# Patient Record
Sex: FEMALE | Race: WHITE | NOT HISPANIC OR LATINO | Employment: FULL TIME | URBAN - METROPOLITAN AREA
[De-identification: names, ages, dates, MRNs, and addresses within clinical notes are randomized per-mention and may not be internally consistent; named-entity substitution may affect disease eponyms.]

---

## 2018-05-10 ENCOUNTER — OFFICE VISIT (OUTPATIENT)
Dept: OTOLARYNGOLOGY | Facility: CLINIC | Age: 48
End: 2018-05-10
Payer: COMMERCIAL

## 2018-05-10 VITALS
WEIGHT: 160 LBS | HEIGHT: 64 IN | BODY MASS INDEX: 27.31 KG/M2 | RESPIRATION RATE: 16 BRPM | HEART RATE: 76 BPM | DIASTOLIC BLOOD PRESSURE: 70 MMHG | SYSTOLIC BLOOD PRESSURE: 118 MMHG

## 2018-05-10 DIAGNOSIS — R42 DISEQUILIBRIUM: ICD-10-CM

## 2018-05-10 DIAGNOSIS — H81.11 BPPV (BENIGN PAROXYSMAL POSITIONAL VERTIGO), RIGHT: Primary | ICD-10-CM

## 2018-05-10 DIAGNOSIS — J06.9 VIRAL URI: ICD-10-CM

## 2018-05-10 PROCEDURE — 99213 OFFICE O/P EST LOW 20 MIN: CPT | Mod: 25 | Performed by: OTOLARYNGOLOGY

## 2018-05-10 PROCEDURE — 95992 CANALITH REPOSITIONING PROC: CPT | Performed by: OTOLARYNGOLOGY

## 2018-05-10 RX ORDER — ATORVASTATIN CALCIUM 20 MG/1
20 TABLET, FILM COATED ORAL
COMMUNITY
Start: 2017-10-23 | End: 2018-08-09 | Stop reason: SDUPTHER

## 2018-05-10 RX ORDER — ACYCLOVIR 400 MG/1
400 TABLET ORAL 3 TIMES DAILY PRN
COMMUNITY
Start: 2017-12-30 | End: 2019-05-22 | Stop reason: ALTCHOICE

## 2018-05-10 ASSESSMENT — ENCOUNTER SYMPTOMS
BACK PAIN: 0
SINUS PRESSURE: 1
DIZZINESS: 1
SLEEP DISTURBANCE: 0
VOMITING: 0
DIARRHEA: 0
HEADACHES: 1
FATIGUE: 0
CONSTIPATION: 0
RHINORRHEA: 0
DYSPHORIC MOOD: 1
WEAKNESS: 0
POLYPHAGIA: 0
FEVER: 0
PALPITATIONS: 0
VOICE CHANGE: 0
COUGH: 0
ARTHRALGIAS: 0
NERVOUS/ANXIOUS: 0
ADENOPATHY: 0
NAUSEA: 0
WHEEZING: 0
TROUBLE SWALLOWING: 0
MYALGIAS: 0
SHORTNESS OF BREATH: 0
FREQUENCY: 0

## 2018-05-10 NOTE — PROGRESS NOTES
Patient ID: Orly Lara                              : 1970    Visit Date: 5/10/2018  Referring Provider: DEIRDRE Saldaña      Chief Complaint: Vertigo    Orly Lara returned to the [place] office today in regard to her history of benign paroxysmal positional vertigo.    Orly suffered a flare up of BPPV last year in the summer. She was able to clear this through self Epley maneuvers.  She then experienced a more severe recurrence in January which took over two weeks to clear.  At the time she used self Epley maneuvers and tried the sommersault maneuver as well.  Believes that the symptoms just gradually resolved after that event.    Two weeks ago the symptoms recurred.  The vertigo has decreased a bit in recent days, but she still has a sensation of unsteadiness on her feet.  She has tinnitus as well.  These symptoms are currently exacerbated by a viral URI that has been ongoing for over one week.  She has not had hearing loss.    Review of Systems   Constitutional: Negative for fatigue and fever.   HENT: Positive for congestion, postnasal drip, sinus pressure and tinnitus. Negative for hearing loss, nosebleeds, rhinorrhea, trouble swallowing and voice change.    Eyes: Negative for visual disturbance.   Respiratory: Negative for cough, shortness of breath and wheezing.    Cardiovascular: Negative for chest pain and palpitations.   Gastrointestinal: Negative for constipation, diarrhea, nausea and vomiting.   Endocrine: Negative for polyphagia and polyuria.   Genitourinary: Negative for frequency.   Musculoskeletal: Negative for arthralgias, back pain, gait problem and myalgias.   Skin: Negative for rash.   Allergic/Immunologic: Negative for environmental allergies.   Neurological: Positive for dizziness and headaches. Negative for weakness.   Hematological: Negative for adenopathy.   Psychiatric/Behavioral: Positive for dysphoric mood. Negative for sleep disturbance. The patient is not  "nervous/anxious.      Current Medications: Acyclovir and atorvastatin.    Physical Exam:  Vitals: /70   Pulse 76   Resp 16   Ht 1.626 m (5' 4\")   Wt 72.6 kg (160 lb)   BMI 27.46 kg/m²   General:  Well-developed, well-nourished 48 y.o. who is in no acute distress.  Voice: Normal without hoarseness, breathiness or stridor.  Head/face:  No scars or lesions.  No parotid masses. No presinus tenderness. Seventh cranial nerves intact.  Eyes:  Extraocular movements and gaze alignment normal.  Ears: Auricles normal.  External auditory canals free of cerumen.  Tympanic membranes clear, mobile and without retraction or scar.  Nose:  Dorsum straight.  Septum sinusoidal.  Turbinates edematous and inflamed but normal in size and orientation.  No pus, polyps or crusts. Clear mucoid secretions bilaterally.  Oral Cavity/oropharynx: Normal tongue thrust. Normal gag reflex. No masses, leukoplakia, erythroplakia, ulcers or other abnormalities at the tongue, floor of mouth, buccal mucosa or palate. Cobblestoning at the posterior pharyngeal wall.  Larynx/hypopharynx:  Normal supraglottis.  Vocal folds smooth and white.  Arytenoids edematous and mobile.  Interarytenoid and post-glottic mucosa edematous.  No masses at the base of tongue, vallecula or piriform sinuses.  Neck:  No masses, adenopathy, cervical spasm or thyroid enlargement.  Cranial Nerves II through XII: Grossly intact.  Lungs: Clear throughout.  Heart: Regular rate and rhythm.  Mental status: Awake, alert and oriented ×3.  No anxiety or depression.  [place]-Hallpike maneuvers: Delayed rotary nystagmus and vertigo in the right head down position.      Epley maneuver  Date/Time: 5/10/2018 9:57 AM  Performed by: TORRES SCHAEFER  Authorized by: TORRES SCHAEFER   Comments: With the patient's verbal consent, she was placed in a supine position with her head lowered gently over the end of the table into my hands.  Her head was gently lowered to 25° below the " horizontal plane while being rotated 45° right of midline.  This triggered the symptoms as described above.  After those symptoms had cleared, the patient maintained this position for 1 minute.  The patient was then assisted in turning her head to 45° left of midline.  The patient maintained this position for 1 minute.  She was then assisted in turning onto her left side in a left lateral decubitus position with the head rotated over the left shoulder looking directly towards the floor.  She maintained this position for 2 minutes.  The patient was then assisted in sitting upright with her legs dangling over the left side of the examination table. She tolerated the procedure well.    Impression:  1.  Right benign paroxysmal positional vertigo status post Epley maneuver.  2.  Disequilibrium relational to BPPV.  3.  Viral upper respiratory infection with rhinitis and eustachian tube dysfunction causing increased tinnitus.    Recommendations and plan:  1.  The patient will follow post-Epley precautions for 3 days.  She received detailed literature in this regard.  2.  Simply saline nasal mist, 2 squirts to each nostril 3 times daily.  3.  Robitussin-DM, 2 teaspoons twice daily as needed until viral upper respiratory infection resolves.  4.  Follow-up in 10 days.    Melany Baltazar MD

## 2018-05-10 NOTE — PROCEDURES
Epley maneuver  Date/Time: 5/10/2018 9:57 AM  Performed by: TORRES SCHAEFER  Authorized by: TORRES SCHAEFER   Comments: With the patient's verbal consent, she was placed in a supine position with her head lowered gently over the end of the table into my hands.  Her head was gently lowered to 25° below the horizontal plane while being rotated 45° right of midline.  This triggered the symptoms as described above.  After those symptoms had cleared, the patient maintained this position for 1 minute.  The patient was then assisted in turning her head to 45° left of midline.  The patient maintained this position for 1 minute.  She was then assisted in turning onto her left side in a left lateral decubitus position with the head rotated over the left shoulder looking directly towards the floor.  She maintained this position for 2 minutes.  The patient was then assisted in sitting upright with her legs dangling over the left side of the examination table. She tolerated the procedure well.

## 2018-05-10 NOTE — PATIENT INSTRUCTIONS
You have benign paroxysmal positional vertigo. This is the most common type of vertigo.  We have treated it with the Epley maneuver today.  Please limit your activities for the next few days as listed below.    1.  No heavy lifting, bending over, reaching up to high shelves, or rapid turning of the head from side to side for 3 days.  2.  No sports, dancing or gardening for 3 days.  3.  Sleep with head propped up on 2-3 pillows on back for 3 nights.  4.  Get up out of bed slowly. Sit on the edge of the bed for a few moments before standing.    5. Simply Saline nasal mist, 2 squirts to each nostril 3x per day.  6. Robitussin DM, 2 teaspoons twice daily as needed until cold resolves.  7. Follow up in 10 days.

## 2018-05-21 ENCOUNTER — OFFICE VISIT (OUTPATIENT)
Dept: OTOLARYNGOLOGY | Facility: CLINIC | Age: 48
End: 2018-05-21
Payer: COMMERCIAL

## 2018-05-21 VITALS
SYSTOLIC BLOOD PRESSURE: 116 MMHG | HEART RATE: 55 BPM | BODY MASS INDEX: 27.31 KG/M2 | WEIGHT: 160 LBS | RESPIRATION RATE: 16 BRPM | HEIGHT: 64 IN | DIASTOLIC BLOOD PRESSURE: 80 MMHG

## 2018-05-21 DIAGNOSIS — H81.12 BENIGN PAROXYSMAL POSITIONAL VERTIGO OF LEFT EAR: Primary | ICD-10-CM

## 2018-05-21 DIAGNOSIS — H83.2X2 VESTIBULAR DISEQUILIBRIUM, LEFT: ICD-10-CM

## 2018-05-21 PROCEDURE — 99213 OFFICE O/P EST LOW 20 MIN: CPT | Performed by: OTOLARYNGOLOGY

## 2018-05-21 ASSESSMENT — ENCOUNTER SYMPTOMS
SINUS PRESSURE: 0
NAUSEA: 0
FATIGUE: 0
VOICE CHANGE: 0
SHORTNESS OF BREATH: 0
BACK PAIN: 0
TROUBLE SWALLOWING: 0
NERVOUS/ANXIOUS: 0
PALPITATIONS: 0
DYSPHORIC MOOD: 1
DIARRHEA: 0
ADENOPATHY: 0
FEVER: 0
RHINORRHEA: 0
DIZZINESS: 1
WEAKNESS: 0
VOMITING: 0
WHEEZING: 0
CONSTIPATION: 0
POLYPHAGIA: 0
ARTHRALGIAS: 0
SLEEP DISTURBANCE: 0
MYALGIAS: 0
COUGH: 0
HEADACHES: 0
FREQUENCY: 0

## 2018-05-21 NOTE — PROGRESS NOTES
"Patient ID: Orly Lara                              : 1970    Visit Date: 2018  Referring Provider: DEIRDRE Ludwig    Chief Complaint: Vertigo    I had the pleasure of seeing Orly Lara when she returned to the [place] office today in regard to recent history of benign paroxysmal positional vertigo.    I am pleased to report that Alvarez is doing very well.  The Epley maneuver alleviated her symptoms.  She has experienced a few brief episodes of wooziness when bending over rapidly but these symptoms have been fleeting.  She continues with non-bothersome bilateral tinnitus.  Her hearing has not changed.    Review of Systems   Constitutional: Negative for fatigue and fever.   HENT: Positive for tinnitus. Negative for congestion, hearing loss, nosebleeds, postnasal drip, rhinorrhea, sinus pressure, trouble swallowing and voice change.    Eyes: Negative for visual disturbance.   Respiratory: Negative for cough, shortness of breath and wheezing.    Cardiovascular: Negative for chest pain and palpitations.   Gastrointestinal: Negative for constipation, diarrhea, nausea and vomiting.   Endocrine: Negative for polyphagia and polyuria.   Genitourinary: Negative for frequency.   Musculoskeletal: Negative for arthralgias, back pain, gait problem and myalgias.   Skin: Negative for rash.   Allergic/Immunologic: Negative for environmental allergies.   Neurological: Positive for dizziness. Negative for weakness and headaches.   Hematological: Negative for adenopathy.   Psychiatric/Behavioral: Positive for dysphoric mood. Negative for sleep disturbance. The patient is not nervous/anxious.      Current Medications: Acyclovir and atorvastatin.    Physical Exam:  Vitals: /80   Pulse (!) 55   Resp 16   Ht 1.626 m (5' 4\")   Wt 72.6 kg (160 lb)   BMI 27.46 kg/m²   General:  Well-developed, well-nourished 48 y.o. who is in no acute distress.  Voice: Normal without hoarseness, breathiness or " stridor.  Head/face:  No scars or lesions.  No parotid masses. No presinus tenderness. Seventh cranial nerves intact.  Eyes:  Extraocular movements and gaze alignment normal.  Ears: Auricles normal.  External auditory canals free of cerumen.  Tympanic membranes clear, mobile and without retraction or scar.  Nose:  Dorsum straight.  Septum sinusoidal.  Turbinates edematous but normal in size and orientation.  No pus, polyps or crusts.  Oral Cavity/oropharynx: Normal tongue thrust. Normal gag reflex. No masses, leukoplakia, erythroplakia, ulcers or other abnormalities at the tongue, floor of mouth, buccal mucosa, palate or posterior pharyngeal wall. Clear post nasal drip. Tonsils - 1 +.  Larynx/hypopharynx:  Normal supraglottis.  Vocal folds smooth and white.  Arytenoids sharp and mobile.  Interarytenoid and post-glottic mucosa normal.  No masses at the base of tongue, vallecula or piriform sinuses.  Neck:  No masses, adenopathy, cervical spasm or thyroid enlargement.  Cranial Nerves II through XII: Grossly intact.  Mental status: Awake, alert and oriented ×3.  No anxiety or depression.  [place]- Hallpike maneuvers: No nystagmus or vertigo in the cardinal positions.  For this reason an Epley maneuver was not performed today.    Impression:  1.  Recent benign paroxysmal positional vertigo, now minimally symptomatic.  2.  Benign tinnitus.    Recommendations and plan:  1.  Habituation exercises twice daily as needed.  2.  Follow-up for recurrent vertigo.  The patient was also instructed in self Epley maneuvers and a half somersault maneuver.    Melany Baltazar MD

## 2018-05-21 NOTE — PATIENT INSTRUCTIONS
1. Try the habituation exercises today.  Do the exercises which cause symptoms twice daily until symptoms are relieved.  2. Come in for recurrent vertigo.  You may also try the self-Epley maneuver or the half somersault.

## 2018-06-13 ENCOUNTER — OFFICE VISIT (OUTPATIENT)
Dept: FAMILY MEDICINE | Facility: CLINIC | Age: 48
End: 2018-06-13
Payer: COMMERCIAL

## 2018-06-13 VITALS
TEMPERATURE: 98.4 F | HEIGHT: 64 IN | WEIGHT: 164.2 LBS | OXYGEN SATURATION: 98 % | HEART RATE: 84 BPM | SYSTOLIC BLOOD PRESSURE: 154 MMHG | DIASTOLIC BLOOD PRESSURE: 104 MMHG | BODY MASS INDEX: 28.03 KG/M2

## 2018-06-13 DIAGNOSIS — J06.9 ACUTE URI: Primary | ICD-10-CM

## 2018-06-13 PROCEDURE — 99213 OFFICE O/P EST LOW 20 MIN: CPT | Performed by: NURSE PRACTITIONER

## 2018-06-13 RX ORDER — CODEINE PHOSPHATE AND GUAIFENESIN 10; 100 MG/5ML; MG/5ML
5 SOLUTION ORAL EVERY 6 HOURS PRN
Qty: 180 ML | Refills: 0 | Status: SHIPPED | OUTPATIENT
Start: 2018-06-13 | End: 2019-07-29

## 2018-06-13 RX ORDER — BENZONATATE 100 MG/1
100 CAPSULE ORAL 3 TIMES DAILY PRN
Qty: 30 CAPSULE | Refills: 0 | Status: SHIPPED | OUTPATIENT
Start: 2018-06-13 | End: 2019-07-29

## 2018-06-13 RX ORDER — AZITHROMYCIN 250 MG/1
TABLET, FILM COATED ORAL
Qty: 6 TABLET | Refills: 0 | Status: SHIPPED | OUTPATIENT
Start: 2018-06-13 | End: 2019-07-29

## 2018-06-13 ASSESSMENT — ENCOUNTER SYMPTOMS
WHEEZING: 0
APPETITE CHANGE: 0
SINUS PRESSURE: 1
COUGH: 1
FACIAL SWELLING: 0
ACTIVITY CHANGE: 0
PALPITATIONS: 0
SHORTNESS OF BREATH: 1
FATIGUE: 1
TROUBLE SWALLOWING: 0
RHINORRHEA: 0
CHEST TIGHTNESS: 0
FEVER: 0
SORE THROAT: 0
CHILLS: 0

## 2018-06-13 NOTE — PROGRESS NOTES
Subjective      Patient ID: Orly Lara is a 48 y.o. female.    Pt presents with x10 days of URI symptoms. Started with 102 fever, head and chest congestion with cough. +fatigue and malaise. Though she was getting better but in the last 2 days, started feeling worse again. No longer having fevers, just dry cough that is very bothersome. Cough is dry, she does admit +CANCINO and chest congestion. She has been coughing so forceful, feels like she pulled a muscle in her back, left side. Sometimes when turning or taking a deep breath, sharp pain. Any coughing causes discomfort in her back. No wheezing. Taking OTC cough medications: Mucinex or Tylenol Cold and Flu. She is sleeping a lot.      Past Medical History:   Diagnosis Date   • Hypertension    • Positional vertigo    • Stroke (CMS/HCC) (HCC)      History reviewed. No pertinent surgical history.  History reviewed. No pertinent family history.  Social History     Social History   • Marital status:      Spouse name: N/A   • Number of children: N/A   • Years of education: N/A     Occupational History   • Not on file.     Social History Main Topics   • Smoking status: Never Smoker   • Smokeless tobacco: Never Used   • Alcohol use Yes   • Drug use: Unknown   • Sexual activity: Not on file     Other Topics Concern   • Not on file     Social History Narrative    Do you wear your seatbelt? Yes    Do you have smoke detector in your home? Yes    Do you have a carbon monoxide detector in your home? 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    Current Marital Status?          Allergies   Allergen Reactions   • No Known Allergies      Current Outpatient Prescriptions on File Prior to Visit   Medication Sig Dispense Refill   • acyclovir (ZOVIRAX) 400 mg tablet 400 mg.     • atorvastatin (LIPITOR) 20 mg tablet 20 mg.       No current facility-administered medications on file prior to visit.        The following have been reviewed and updated as appropriate  "in this visit:  Problems       Review of Systems   Constitutional: Positive for fatigue. Negative for activity change, appetite change, chills and fever.   HENT: Positive for congestion and sinus pressure. Negative for ear pain, facial swelling, postnasal drip, rhinorrhea, sore throat and trouble swallowing.    Eyes: Negative for visual disturbance.   Respiratory: Positive for cough and shortness of breath. Negative for chest tightness and wheezing.    Cardiovascular: Negative for chest pain and palpitations.       Objective     Vitals:    06/13/18 1452   BP: (!) 154/104   BP Location: Right upper arm   Patient Position: Sitting   Pulse: 84   Temp: 36.9 °C (98.4 °F)   TempSrc: Oral   SpO2: 98%   Weight: 74.5 kg (164 lb 3.2 oz)   Height: 1.626 m (5' 4\")     Body mass index is 28.18 kg/m².    Physical Exam   Constitutional: She is oriented to person, place, and time. She appears well-developed and well-nourished. No distress.   HENT:   Head: Normocephalic and atraumatic.   Right Ear: External ear normal.   Left Ear: External ear normal.   Nose: Nose normal.   Mouth/Throat: Oropharynx is clear and moist.   Eyes: Conjunctivae are normal. Pupils are equal, round, and reactive to light.   Neck: Normal range of motion. Neck supple.   Cardiovascular: Normal rate, regular rhythm and normal heart sounds.    Pulmonary/Chest: Effort normal and breath sounds normal. No respiratory distress.   Lymphadenopathy:     She has no cervical adenopathy.   Neurological: She is alert and oriented to person, place, and time.   Skin: Skin is warm and dry.   Psychiatric: She has a normal mood and affect. Her behavior is normal. Judgment and thought content normal.       Assessment/Plan   Problem List Items Addressed This Visit     None      Visit Diagnoses     Acute URI    -  Primary    - Likely post-viral cough  - Rx Tessalon as needed TID, Rx Guaitussin AC PRN  - Fluids and rest. Rx Z-pack only if symptoms fail to improve or worsen    " Relevant Medications    azithromycin (ZITHROMAX) 250 mg tablet      - Consider CXR if rib/posterior thoracic pain persists, pain is likely musculoskeletal strain due to forceful coughing

## 2018-07-09 ENCOUNTER — OFFICE VISIT (OUTPATIENT)
Dept: OTOLARYNGOLOGY | Facility: CLINIC | Age: 48
End: 2018-07-09
Payer: COMMERCIAL

## 2018-07-09 VITALS
BODY MASS INDEX: 27.31 KG/M2 | OXYGEN SATURATION: 98 % | HEART RATE: 68 BPM | WEIGHT: 160 LBS | SYSTOLIC BLOOD PRESSURE: 122 MMHG | DIASTOLIC BLOOD PRESSURE: 86 MMHG | HEIGHT: 64 IN

## 2018-07-09 DIAGNOSIS — H81.12 BENIGN PAROXYSMAL POSITIONAL VERTIGO OF LEFT EAR: Primary | ICD-10-CM

## 2018-07-09 DIAGNOSIS — H83.2X2 VESTIBULAR DISEQUILIBRIUM, LEFT: ICD-10-CM

## 2018-07-09 PROCEDURE — 99213 OFFICE O/P EST LOW 20 MIN: CPT | Performed by: OTOLARYNGOLOGY

## 2018-07-09 ASSESSMENT — ENCOUNTER SYMPTOMS
TROUBLE SWALLOWING: 0
HEADACHES: 0
VOMITING: 0
SHORTNESS OF BREATH: 0
CONSTIPATION: 0
DIZZINESS: 1
FATIGUE: 0
WHEEZING: 0
FREQUENCY: 0
FEVER: 0
VOICE CHANGE: 0
BACK PAIN: 0
DYSPHORIC MOOD: 0
SINUS PRESSURE: 0
ARTHRALGIAS: 0
COUGH: 0
POLYPHAGIA: 0
PALPITATIONS: 0
WEAKNESS: 0
RHINORRHEA: 0
NERVOUS/ANXIOUS: 0
DIARRHEA: 0
MYALGIAS: 0
ADENOPATHY: 0
SLEEP DISTURBANCE: 0
NAUSEA: 0

## 2018-07-09 NOTE — PROGRESS NOTES
"Patient ID: Orly Lara                              : 1970    Visit Date: 2018  Referring Provider: Bethany Celestin CRNP    Chief Complaint: Vertigo    Orly Lara returned to the [place] office today in regard to her history of benign paroxysmal positional vertigo.    Orly is once again vertiginous.  The symptoms started yesterday with out any antecedent event.  Orly has not had allergy symptoms nor a viral illness.  She has not been on an airplane flight recently nor has she suffered a head trauma.    She performed a self Epley maneuver followed by a half somersault maneuver and has minimal symptoms today.  However, she still feels \"not quite right\" and has mild nausea and disequilibrium.  Her hearing has not changed.  She denies tinnitus.    Review of Systems   Constitutional: Negative for fatigue and fever.   HENT: Negative for congestion, hearing loss, nosebleeds, postnasal drip, rhinorrhea, sinus pressure, tinnitus, trouble swallowing and voice change.         Vertigo.   Eyes: Negative for visual disturbance.   Respiratory: Negative for cough, shortness of breath and wheezing.    Cardiovascular: Negative for chest pain and palpitations.   Gastrointestinal: Negative for constipation, diarrhea, nausea and vomiting.   Endocrine: Negative for polyphagia and polyuria.   Genitourinary: Negative for frequency.   Musculoskeletal: Negative for arthralgias, back pain, gait problem and myalgias.   Skin: Negative for rash.   Allergic/Immunologic: Negative for environmental allergies.   Neurological: Positive for dizziness. Negative for weakness and headaches.   Hematological: Negative for adenopathy.   Psychiatric/Behavioral: Negative for dysphoric mood and sleep disturbance. The patient is not nervous/anxious.      Current Medications: Acyclovir and atorvastatin.    Physical Exam:  Vitals: /86   Pulse 68   Ht 1.626 m (5' 4\")   Wt 72.6 kg (160 lb)   SpO2 98%   BMI 27.46 kg/m² "   General:  Well-developed, well-nourished 48 y.o. who is in no acute distress.  Voice: Normal without hoarseness, breathiness or stridor.  Head/face:  No scars or lesions.  No parotid masses. No presinus tenderness. Seventh cranial nerves intact.  Eyes:  Extraocular movements and gaze alignment normal.  No spontaneous or inducible nystagmus.  Ears: Auricles normal.  External auditory canals free of cerumen.  Tympanic membranes clear, mobile and without retraction or scar.  Nose:  Dorsum straight.  Septum deflected to right. Right inferior turbinate edematous with contact points to septum.  Left inferior turbinate normal in size and orientation. No pus, polyps or crusts.  Oral Cavity/oropharynx: Normal tongue thrust. Normal gag reflex. No masses, leukoplakia, erythroplakia, ulcers or other abnormalities at the tongue, floor of mouth, buccal mucosa, palate or posterior pharyngeal wall.  Larynx/hypopharynx:  Normal supraglottis.  Vocal folds smooth and white.  Arytenoids sharp and mobile.  Interarytenoid and post-glottic mucosa normal.  No masses at the base of tongue, vallecula or piriform sinuses.  Neck:  No masses, adenopathy or thyroid enlargement. Mild cervical spasm.  Cranial Nerves II through XII: Grossly intact.  Mental status: Awake, alert and oriented ×3.  No anxiety or depression.  [place] Hallpike Maneuvers: No rotary nystagmus or symptoms in any of the cardinal positions.      Impression:  1.  Left benign paroxysmal positional vertigo.  The patient cleared this with maneuvers on her own.    Recommendations and plan:  1.  Follow post-Epley precautions for the next 2-3 days.  2.  Begin habituation exercises twice daily tomorrow if need be.  3.  Follow-up as needed.        Melany Baltazar MD

## 2018-07-09 NOTE — PATIENT INSTRUCTIONS
1. Sleep with head propped up on pillows for 3 nights. No heavy lifting or bending over.  2. Begin habituation exercises morning and night until symptoms clear.  3. Follow up as needed.

## 2018-08-09 RX ORDER — ATORVASTATIN CALCIUM 20 MG/1
20 TABLET, FILM COATED ORAL DAILY
Qty: 90 TABLET | Refills: 0 | Status: ON HOLD | OUTPATIENT
Start: 2018-08-09 | End: 2018-10-31

## 2018-10-24 ENCOUNTER — APPOINTMENT (RX ONLY)
Dept: URBAN - METROPOLITAN AREA CLINIC 26 | Facility: CLINIC | Age: 48
Setting detail: DERMATOLOGY
End: 2018-10-24

## 2018-10-24 DIAGNOSIS — L57.0 ACTINIC KERATOSIS: ICD-10-CM

## 2018-10-24 DIAGNOSIS — L82.1 OTHER SEBORRHEIC KERATOSIS: ICD-10-CM

## 2018-10-24 PROBLEM — I63.50 CEREBRAL INFARCTION DUE TO UNSPECIFIED OCCLUSION OR STENOSIS OF UNSPECIFIED CEREBRAL ARTERY: Status: ACTIVE | Noted: 2018-10-24

## 2018-10-24 PROBLEM — E78.5 HYPERLIPIDEMIA, UNSPECIFIED: Status: ACTIVE | Noted: 2018-10-24

## 2018-10-24 PROCEDURE — ? LIQUID NITROGEN

## 2018-10-24 PROCEDURE — 17000 DESTRUCT PREMALG LESION: CPT

## 2018-10-24 PROCEDURE — ? COUNSELING

## 2018-10-24 PROCEDURE — 99213 OFFICE O/P EST LOW 20 MIN: CPT | Mod: 25

## 2018-10-24 ASSESSMENT — LOCATION ZONE DERM
LOCATION ZONE: TRUNK
LOCATION ZONE: FACE

## 2018-10-24 ASSESSMENT — LOCATION DETAILED DESCRIPTION DERM
LOCATION DETAILED: RIGHT SUPERIOR LATERAL BUCCAL CHEEK
LOCATION DETAILED: RIGHT MEDIAL SUPERIOR CHEST

## 2018-10-24 ASSESSMENT — LOCATION SIMPLE DESCRIPTION DERM
LOCATION SIMPLE: CHEST
LOCATION SIMPLE: RIGHT CHEEK

## 2018-10-24 NOTE — PROCEDURE: LIQUID NITROGEN
Detail Level: Simple
Render Post-Care Instructions In Note?: yes
Duration Of Freeze Thaw-Cycle (Seconds): 0
Post-Care Instructions: I reviewed with the patient in detail post-care instructions. Patient is to wear sunprotection, and avoid picking at any of the treated lesions. Patient may apply Vaseline to crusted or scabbing areas. Patient understands to RTO 1 month if still present or recurs.
Consent: The patient's consent was obtained including but not limited to risks of crusting, scabbing, blistering, scarring, darker or lighter pigmentary change, recurrence, incomplete removal and infection.

## 2018-10-30 ENCOUNTER — APPOINTMENT (EMERGENCY)
Dept: RADIOLOGY | Facility: HOSPITAL | Age: 48
DRG: 149 | End: 2018-10-30
Attending: EMERGENCY MEDICINE
Payer: COMMERCIAL

## 2018-10-30 ENCOUNTER — HOSPITAL ENCOUNTER (OUTPATIENT)
Facility: HOSPITAL | Age: 48
Setting detail: OBSERVATION
LOS: 1 days | Discharge: HOME | DRG: 149 | End: 2018-10-31
Attending: EMERGENCY MEDICINE | Admitting: HOSPITALIST
Payer: COMMERCIAL

## 2018-10-30 ENCOUNTER — APPOINTMENT (INPATIENT)
Dept: RADIOLOGY | Facility: HOSPITAL | Age: 48
DRG: 149 | End: 2018-10-30
Attending: HOSPITALIST
Payer: COMMERCIAL

## 2018-10-30 DIAGNOSIS — R42 VERTIGO: Primary | ICD-10-CM

## 2018-10-30 LAB
ANION GAP SERPL CALC-SCNC: 5 MEQ/L (ref 3–15)
APTT PPP: 25 SEC (ref 23–35)
BASOPHILS # BLD: 0.03 K/UL (ref 0.01–0.1)
BASOPHILS NFR BLD: 0.5 %
BILIRUB UR QL STRIP.AUTO: NEGATIVE MG/DL
BUN SERPL-MCNC: 10 MG/DL (ref 8–20)
CALCIUM SERPL-MCNC: 9.5 MG/DL (ref 8.9–10.3)
CHLORIDE SERPL-SCNC: 107 MEQ/L (ref 98–109)
CLARITY UR REFRACT.AUTO: CLEAR
CO2 SERPL-SCNC: 25 MEQ/L (ref 22–32)
COLOR UR AUTO: YELLOW
CREAT SERPL-MCNC: 0.7 MG/DL (ref 0.6–1.1)
DIFFERENTIAL METHOD BLD: NORMAL
EOSINOPHIL # BLD: 0.11 K/UL (ref 0.04–0.36)
EOSINOPHIL NFR BLD: 1.9 %
ERYTHROCYTE [DISTWIDTH] IN BLOOD BY AUTOMATED COUNT: 12.1 % (ref 11.7–14.4)
GFR SERPL CREATININE-BSD FRML MDRD: >60 ML/MIN/1.73M*2
GLUCOSE BLD-MCNC: 106 MG/DL (ref 70–99)
GLUCOSE SERPL-MCNC: 93 MG/DL (ref 70–99)
GLUCOSE UR STRIP.AUTO-MCNC: NEGATIVE MG/DL
HCT VFR BLDCO AUTO: 39.9 % (ref 35–45)
HGB BLD-MCNC: 13 G/DL (ref 11.8–15.7)
HGB UR QL STRIP.AUTO: NEGATIVE
IMM GRANULOCYTES # BLD AUTO: 0.01 K/UL (ref 0–0.08)
IMM GRANULOCYTES NFR BLD AUTO: 0.2 %
INR PPP: 0.9 INR
KETONES UR STRIP.AUTO-MCNC: NEGATIVE MG/DL
LEUKOCYTE ESTERASE UR QL STRIP.AUTO: NEGATIVE
LYMPHOCYTES # BLD: 2.2 K/UL (ref 1.2–3.5)
LYMPHOCYTES NFR BLD: 38.1 %
MCH RBC QN AUTO: 30 PG (ref 28–33.2)
MCHC RBC AUTO-ENTMCNC: 32.6 G/DL (ref 32.2–35.5)
MCV RBC AUTO: 91.9 FL (ref 83–98)
MONOCYTES # BLD: 0.4 K/UL (ref 0.28–0.8)
MONOCYTES NFR BLD: 6.9 %
NEUTROPHILS # BLD: 3.03 K/UL (ref 1.7–7)
NEUTS SEG NFR BLD: 52.4 %
NITRITE UR QL STRIP.AUTO: NEGATIVE
NRBC BLD-RTO: 0 %
PDW BLD AUTO: 12 FL (ref 9.4–12.3)
PH UR STRIP.AUTO: 7 [PH]
PLATELET # BLD AUTO: 183 K/UL (ref 150–369)
POCT TEST: ABNORMAL
POTASSIUM SERPL-SCNC: 3.9 MEQ/L (ref 3.6–5.1)
PROT UR QL STRIP.AUTO: NEGATIVE
PROTHROMBIN TIME: 11.9 SEC (ref 12.2–14.5)
RBC # BLD AUTO: 4.34 M/UL (ref 3.93–5.22)
SODIUM SERPL-SCNC: 137 MEQ/L (ref 136–144)
SP GR UR REFRACT.AUTO: 1.01
TROPONIN I SERPL-MCNC: <0.02 NG/ML
UROBILINOGEN UR STRIP-ACNC: 0.2 EU/DL
WBC # BLD AUTO: 5.78 K/UL (ref 3.8–10.5)

## 2018-10-30 PROCEDURE — 85610 PROTHROMBIN TIME: CPT | Performed by: PHYSICIAN ASSISTANT

## 2018-10-30 PROCEDURE — 81003 URINALYSIS AUTO W/O SCOPE: CPT | Performed by: PHYSICIAN ASSISTANT

## 2018-10-30 PROCEDURE — 21400000 HC ROOM AND CARE CCU/INTERMEDIATE

## 2018-10-30 PROCEDURE — 63700000 HC SELF-ADMINISTRABLE DRUG: Performed by: PHYSICIAN ASSISTANT

## 2018-10-30 PROCEDURE — 71045 X-RAY EXAM CHEST 1 VIEW: CPT

## 2018-10-30 PROCEDURE — 85025 COMPLETE CBC W/AUTO DIFF WBC: CPT | Performed by: PHYSICIAN ASSISTANT

## 2018-10-30 PROCEDURE — 70551 MRI BRAIN STEM W/O DYE: CPT

## 2018-10-30 PROCEDURE — 80048 BASIC METABOLIC PNL TOTAL CA: CPT | Performed by: PHYSICIAN ASSISTANT

## 2018-10-30 PROCEDURE — 99223 1ST HOSP IP/OBS HIGH 75: CPT | Performed by: HOSPITALIST

## 2018-10-30 PROCEDURE — 70450 CT HEAD/BRAIN W/O DYE: CPT

## 2018-10-30 PROCEDURE — 93005 ELECTROCARDIOGRAM TRACING: CPT | Performed by: PHYSICIAN ASSISTANT

## 2018-10-30 PROCEDURE — 85730 THROMBOPLASTIN TIME PARTIAL: CPT | Performed by: PHYSICIAN ASSISTANT

## 2018-10-30 PROCEDURE — 99285 EMERGENCY DEPT VISIT HI MDM: CPT | Mod: 25

## 2018-10-30 PROCEDURE — 84484 ASSAY OF TROPONIN QUANT: CPT | Performed by: PHYSICIAN ASSISTANT

## 2018-10-30 PROCEDURE — 63700000 HC SELF-ADMINISTRABLE DRUG: Performed by: HOSPITALIST

## 2018-10-30 PROCEDURE — 36415 COLL VENOUS BLD VENIPUNCTURE: CPT | Performed by: PHYSICIAN ASSISTANT

## 2018-10-30 RX ORDER — DEXTROSE 50 % IN WATER (D50W) INTRAVENOUS SYRINGE
25 AS NEEDED
Status: DISCONTINUED | OUTPATIENT
Start: 2018-10-30 | End: 2018-10-31 | Stop reason: HOSPADM

## 2018-10-30 RX ORDER — NITROGLYCERIN 0.4 MG/1
0.4 TABLET SUBLINGUAL EVERY 5 MIN PRN
Status: DISCONTINUED | OUTPATIENT
Start: 2018-10-30 | End: 2018-10-31 | Stop reason: HOSPADM

## 2018-10-30 RX ORDER — IBUPROFEN 200 MG
16-32 TABLET ORAL AS NEEDED
Status: DISCONTINUED | OUTPATIENT
Start: 2018-10-30 | End: 2018-10-31 | Stop reason: HOSPADM

## 2018-10-30 RX ORDER — DEXTROSE 40 %
15-30 GEL (GRAM) ORAL AS NEEDED
Status: DISCONTINUED | OUTPATIENT
Start: 2018-10-30 | End: 2018-10-31 | Stop reason: HOSPADM

## 2018-10-30 RX ORDER — NAPROXEN SODIUM 220 MG/1
324 TABLET, FILM COATED ORAL ONCE
Status: COMPLETED | OUTPATIENT
Start: 2018-10-30 | End: 2018-10-30

## 2018-10-30 RX ORDER — ATROPINE SULFATE 0.1 MG/ML
0.5 INJECTION INTRAVENOUS EVERY 5 MIN PRN
Status: DISCONTINUED | OUTPATIENT
Start: 2018-10-30 | End: 2018-10-31 | Stop reason: HOSPADM

## 2018-10-30 RX ORDER — ACYCLOVIR 400 MG/1
400 TABLET ORAL 3 TIMES DAILY PRN
Status: DISCONTINUED | OUTPATIENT
Start: 2018-10-30 | End: 2018-10-31 | Stop reason: HOSPADM

## 2018-10-30 RX ORDER — ATORVASTATIN CALCIUM 20 MG/1
20 TABLET, FILM COATED ORAL NIGHTLY
Status: DISCONTINUED | OUTPATIENT
Start: 2018-10-30 | End: 2018-10-31 | Stop reason: HOSPADM

## 2018-10-30 RX ORDER — ASPIRIN 81 MG/1
81 TABLET ORAL DAILY
Status: DISCONTINUED | OUTPATIENT
Start: 2018-10-30 | End: 2018-10-31 | Stop reason: HOSPADM

## 2018-10-30 RX ADMIN — PROBIOTIC PRODUCT - TAB 1 TABLET: TAB at 14:27

## 2018-10-30 RX ADMIN — ATORVASTATIN CALCIUM 20 MG: 20 TABLET, FILM COATED ORAL at 22:35

## 2018-10-30 RX ADMIN — ASPIRIN 81 MG 324 MG: 81 TABLET ORAL at 08:47

## 2018-10-30 ASSESSMENT — ENCOUNTER SYMPTOMS
DIFFICULTY URINATING: 0
CHEST TIGHTNESS: 0
FEVER: 0
NAUSEA: 0
VOMITING: 0
VISUAL CHANGE: 0
DIZZINESS: 1
DIARRHEA: 0
NUMBNESS: 1
SHORTNESS OF BREATH: 0
SPEECH DIFFICULTY: 0
WEAKNESS: 1
SYNCOPE: 0
HEADACHES: 0

## 2018-10-30 NOTE — ED PROVIDER NOTES
HPI     Chief Complaint   Patient presents with   • Vertigo         History provided by:  Patient and EMS personnel  Dizziness   Quality:  Room spinning  Duration: Started at 0700.  Progression:  Worsening  Chronicity:  Recurrent (Pt with h/o cerebellar infarct and BPPV. Pt reports at 0700 while driving started with room spinning sensation and left head/arm numbness weakness. Pt reports symptoms improved)  Associated symptoms: weakness (left arm)    Associated symptoms: no chest pain, no diarrhea, no headaches, no nausea, no shortness of breath, no syncope, no vision changes and no vomiting    Risk factors: hx of vertigo         Patient History     Past Medical History:   Diagnosis Date   • Hypertension    • Positional vertigo    • Stroke (CMS/HCC) (HCC)        Past Surgical History:   Procedure Laterality Date   • HYSTEROSCOPY W/ ENDOMETRIAL ABLATION         History reviewed. No pertinent family history.    Social History   Substance Use Topics   • Smoking status: Former Smoker   • Smokeless tobacco: Never Used   • Alcohol use Yes       Systems Reviewed from Nursing Triage:  Tobacco  Allergies  Meds  Problems  Med Hx  Surg Hx  Fam Hx  Soc Hx           Review of Systems     Review of Systems   Constitutional: Negative for fever.   Respiratory: Negative for chest tightness and shortness of breath.    Cardiovascular: Negative for chest pain and syncope.   Gastrointestinal: Negative for diarrhea, nausea and vomiting.   Genitourinary: Negative for difficulty urinating.   Neurological: Positive for dizziness, weakness (left arm) and numbness (left face/arm). Negative for syncope, speech difficulty and headaches.        Physical Exam     ED Triage Vitals   Temp Heart Rate Resp BP SpO2   10/30/18 0817 10/30/18 0817 10/30/18 0817 10/30/18 0817 10/30/18 0817   37 °C (98.6 °F) 66 17 (!) 148/100 99 %      Temp Source Heart Rate Source Patient Position BP Location FiO2 (%) (Set)   10/30/18 0817 -- 10/30/18 1141 10/30/18  "1141 --   Tympanic  Lying Left upper arm        Pulse Ox %: 99 % (10/30/18 0907)  Pulse Ox Interpretation: Normal (10/30/18 0907)  Heart Rate: 66 (10/30/18 0907)  Rhythm Strip Interpretation: Normal Sinus Rhythm (10/30/18 0907)    Patient Vitals for the past 24 hrs:   BP Temp Temp src Pulse Resp SpO2 Height Weight   10/30/18 1141 (!) 148/90 36.4 °C (97.6 °F) Oral - 16 99 % 1.6 m (5' 3\") 76.7 kg (169 lb)   10/30/18 0930 131/88 - - - 16 97 % - -   10/30/18 0817 (!) 148/100 37 °C (98.6 °F) Tympanic 66 17 99 % 1.613 m (5' 3.5\") 74.8 kg (165 lb)             NIH Stroke Scale (Adult)     Row Name 10/30/18 0755                NIH Stroke Scale (Adult)    NIH Stroke Scale NIH Stroke Scale (Group)  -NH       Row Name 10/30/18 08:18:24 10/30/18 0755             NIH Stroke Scale    Interval Upon admission  -JT  --      1a. Level Of Consciousness 0-->Alert: keenly responsive  -JT 0-->Alert: keenly responsive  -NH      1b. LOC Questions 0-->Answers both questions correctly  -JT 0-->Answers both questions correctly  -NH      1c. LOC Commands 0-->Performs both tasks correctly  -JT 0-->Performs both tasks correctly  -NH      2. Best Gaze 0-->Normal  -JT 0-->Normal  -NH      3. Visual 0-->No visual loss  -JT 0-->No visual loss  -NH      4. Facial Palsy 0-->Normal symmetrical movements  -JT 0-->Normal symmetrical movements  -NH      5a. Motor Arm, Left 0-->No drift: limb holds 90 (or 45) degrees for full 10 secs  -JT 0-->No drift: limb holds 90 (or 45) degrees for full 10 secs  -NH      5b. Motor Arm, Right 0-->No drift: limb holds 90 (or 45) degrees for full 10 secs  -JT 0-->No drift: limb holds 90 (or 45) degrees for full 10 secs  -NH      6a. Motor Leg, Left 0-->No drift: leg holds 30 degree position for full 5 secs  -JT 0-->No drift: leg holds 30 degree position for full 5 secs  -NH      6b. Motor Leg, Right 0-->No drift: leg holds 30 degree position for full 5 secs  -JT 0-->No drift: leg holds 30 degree position for full 5 secs  " -NH      7. Limb Ataxia 0-->Absent  -JT 0-->Absent  -NH      8. Sensory 0-->Normal: no sensory loss  -JT 0-->Normal: no sensory loss  -NH      9. Best Language 0-->No aphasia: normal  -JT 0-->No aphasia: normal  -NH      10. Dysarthria 0-->Normal  -JT 0-->Normal  -NH      11. Extinction and Inattention (formerly Neglect) 0-->No abnormality  -JT 0-->No abnormality  -NH      Total (NIH Stroke Scale) 0  -JT 0  -NH        User Key  (r) = Recorded By, (t) = Taken By, (c) = Cosigned By    Initials Name    Sarita Pryor RN NH Hoffman, Nicole L, PA C          Physical Exam   Constitutional: She is oriented to person, place, and time. She appears well-developed and well-nourished. No distress.   HENT:   Head: Atraumatic.   Eyes: EOM are normal. Pupils are equal, round, and reactive to light.   Neck: Normal range of motion. Neck supple.   Cardiovascular: Normal rate and regular rhythm.    Pulmonary/Chest: Effort normal and breath sounds normal.   Abdominal: Soft.   Musculoskeletal: Normal range of motion.   Neurological: She is alert and oriented to person, place, and time. No cranial nerve deficit or sensory deficit.   Pt with subjective weakness/numbness to left arm   Skin: Skin is warm and dry.   Nursing note and vitals reviewed.           Procedures    ED Course & MDM     Labs Reviewed   PROTIME-INR - Abnormal        Result Value    PT 11.9 (*)     INR 0.9     POCT GLUCOSE (BEAKER) - Abnormal     POCT Bedside Glucose 106 (*)     POC Test POC     BASIC METABOLIC PANEL - Normal    Sodium 137      Potassium 3.9      Chloride 107      CO2 25      BUN 10      Creatinine 0.7      Glucose 93      Calcium 9.5      eGFR >60.0      Anion Gap 5     PTT - Normal    PTT 25     TROPONIN I - Normal    Troponin I <0.02     CBC - Normal    WBC 5.78      RBC 4.34      Hemoglobin 13.0      Hematocrit 39.9      MCV 91.9      MCH 30.0      MCHC 32.6      RDW 12.1      Platelets 183      MPV 12.0     UA REFLEX CULTURE (MACROSCOPIC) -  Normal    Color, Urine Yellow      Clarity, Urine Clear      Specific Gravity, Urine 1.010      pH, Urine 7.0      Leukocyte Esterase Negative      Nitrite, Urine Negative      Protein, Urine Negative      Glucose, Urine Negative      Ketones, Urine Negative      Urobilinogen, Urine 0.2      Bilirubin, Urine Negative      Blood, Urine Negative     CBC AND DIFFERENTIAL    Narrative:     The following orders were created for panel order CBC and Differential.  Procedure                               Abnormality         Status                     ---------                               -----------         ------                     CBC[[id number]]                           Normal              Final result               Diff Count[[id number]]                                        Final result                 Please view results for these tests on the individual orders.   URINALYSIS REFLEX CULTURE    Narrative:     The following orders were created for panel order Urinalysis with Reflex Culture.  Procedure                               Abnormality         Status                     ---------                               -----------         ------                     UA Reflex to Culture (Mac...[[id number]]  Normal              Final result                 Please view results for these tests on the individual orders.   DIFF COUNT    Differential Type Auto      nRBC 0.0      Immature Granulocytes 0.2      Neutrophils 52.4      Lymphocytes 38.1      Monocytes 6.9      Eosinophils 1.9      Basophils 0.5      Immature Granulocytes, Absolute 0.01      Neutrophils, Absolute 3.03      Lymphocytes, Absolute 2.20      Monocytes, Absolute 0.40      Eosinophils, Absolute 0.11      Basophils, Absolute 0.03     LIPID PROFILE   POCT GLUCOSE       X-RAY CHEST 1 VIEW   Final Result   IMPRESSION:   Radiographically normal chest.      COMMENT:   A single portable AP erect chest radiograph is interpreted without   comparison. The  cardiomediastinal contour is normal. The lungs are clear   bilaterally. The osseous thorax and surrounding soft tissues are unremarkable.         CT STROKE ALERT   Final Result   IMPRESSION: No evidence of hemorrhage, mass or acute territorial infarction by   CT criteria.         Finding:    Stroke alert   Acuity: Critical  Status:  CLOSED      Critical read back was performed and results were read back by Dr. Mccullough, , on   10/30/2018 at 8:06 AM            COMMENT:      Technique: Computed tomography of the brain was performed utilizing contiguous   2.5 mm transaxial sections without intravenous contrast administration.      CT DOSE:  One or more dose reduction techniques (e.g. automated exposure   control, adjustment of the mA and/or kV according to patient size, use of   iterative reconstruction technique) utilized for this examination.      Comparison studies: None.      Postsurgical change: None.      Brain parenchyma: There is no intraparenchymal hemorrhage, mass effect, midline   shift or extra-axial fluid collection.   White matter changes: Normal for age   Ventricles, cisterns, and sulci: Normal in size and configuration.   Sella and cerebellar tonsils: Normal in appearance.         Calvarium and extra cranial soft tissues: Normal.   Paranasal sinuses: Clear bilaterally.   Mastoid air cell: Normal   Orbits: Normal            ECG 12 lead    (Results Pending)   MRI BRAIN WITHOUT CONTRAST    (Results Pending)           [facility]         ED Course as of Oct 30 1431   Tue Oct 30, 2018   0750 NIH stroke scale 0 but pt still c/o numbness weakness in L side of head/arm. Symptoms <1 hour. Stroke alert called  [NH]   0755 Dr. Mccullough d/w Dr. Hidalgo. Due to rapidly improving symptoms with NIH stroke score 0, pt not a tPA candidate   [NH]   0817 CT scan NAD. Ordered ASA  [NH]   0927 Labs unremarkable. Paged [facility]  [NH]      ED Course User Index  [NH] Linda Eddy PA C         Clinical Impressions as of Oct 30 1431   Vertigo -  r/u VBI     Disposition: Admitted      Linda Eddy PA C  10/30/18 5801

## 2018-10-30 NOTE — ASSESSMENT & PLAN NOTE
Lipid panel reviewed and discussed with patient and spouse.   , goal less than 70  Will increase Lipitor to 40mg at discharge    Continue diet modification

## 2018-10-30 NOTE — H&P
Inpatient History & Physical        Hospital Medicine Service -  History & Physical        CHIEF COMPLAINT   vertigo and numbness   HISTORY OF PRESENT ILLNESS      Orly Lara is a 48 y.o. female with a past medical history of cerebellar stroke , BPV and hyperlipemia remote smoking , family h/o premature heart dz and strokes who presents with mild vertigo and numbness of l side of head , face  And weakness of L upper extremity  since morning .She is avoiding to move to much 2/2 vertigo like symptoms .She reports having headache  , which resolved with Excedrin all last week. She denies any flasing lights and any other visual symptoms . Denies any fever,  nausea and vomiting. She feels that this is different than her usual vertigo .   Denies any other focal neurological symptoms.Denies any head and neck trauma and    weight lifting .She denies any chest pain , sob,palp , and dizziness         PAST MEDICAL AND SURGICAL HISTORY      Past Medical History:   Diagnosis Date   • Hypertension    • Positional vertigo    • Stroke (CMS/HCC) (HCC)        Past Surgical History:   Procedure Laterality Date   • HYSTEROSCOPY W/ ENDOMETRIAL ABLATION         PCP: Bethany Celestin CRNP    MEDICATIONS      Prior to Admission medications    Medication Sig Start Date End Date Taking? Authorizing Provider   Lactobacillus acidophilus (PROBIOTIC ORAL) Take 1 capsule by mouth daily.   Yes David Chen MD   acyclovir (ZOVIRAX) 400 mg tablet Take 400 mg by mouth 3 (three) times a day as needed (for 7 days for cold sore outbreak).   12/30/17   ProviderDavid MD   atorvastatin (LIPITOR) 20 mg tablet Take 1 tablet (20 mg total) by mouth daily.  Patient taking differently: Take 20 mg by mouth nightly.   8/9/18   Seth Montemayor MD       ALLERGIES      Patient has no known allergies.    FAMILY HISTORY      History reviewed. No pertinent family history.    SOCIAL HISTORY      Social History     Social History   • Marital  status:      Spouse name: N/A   • Number of children: N/A   • Years of education: N/A     Social History Main Topics   • Smoking status: Former Smoker   • Smokeless tobacco: Never Used   • Alcohol use Yes   • Drug use: Unknown   • Sexual activity: Not Asked     Other Topics Concern   • None     Social History Narrative    Do you wear your seatbelt? Yes    Do you have smoke detector in your home? Yes    Do you have a carbon monoxide detector in your home? 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    Current Marital Status?            REVIEW OF SYSTEMS      All other systems reviewed and negative except as noted in HPI    PHYSICAL EXAMINATION      Temp:  [37 °C (98.6 °F)] 37 °C (98.6 °F)  Heart Rate:  [66] 66  Resp:  [16-17] 16  BP: (131-148)/() 131/88  Body mass index is 28.77 kg/m².    Physical Exam   Constitutional: She is oriented to person, place, and time. She appears well-developed and well-nourished.   HENT:   Head: Normocephalic and atraumatic.   Eyes: EOM are normal. Pupils are equal, round, and reactive to light.   Neck: Normal range of motion. Neck supple.   No nystagmus   Cardiovascular: Normal rate and regular rhythm.    Pulmonary/Chest: Effort normal and breath sounds normal.   Abdominal: Soft. Bowel sounds are normal.   Musculoskeletal: Normal range of motion.   Neurological: She is alert and oriented to person, place, and time.   Skin: Skin is warm and dry.   lue weakness   Psychiatric: She has a normal mood and affect.     Physical Exam   Constitutional: She is oriented to person, place, and time. She appears well-developed and well-nourished.   HENT:   Head: Normocephalic and atraumatic.   Eyes: EOM are normal. Pupils are equal, round, and reactive to light.   Neck: Normal range of motion. Neck supple.   No nystagmus   Cardiovascular: Normal rate and regular rhythm.    Pulmonary/Chest: Effort normal and breath sounds normal.   Abdominal: Soft. Bowel sounds are normal.    Musculoskeletal: Normal range of motion.   Neurological: She is alert and oriented to person, place, and time.   Skin: Skin is warm and dry.   lue weakness   Psychiatric: She has a normal mood and affect.     LABS / IMAGING / EKG        Results from last 7 days  Lab Units 10/30/18  0826   WBC K/uL 5.78   HEMOGLOBIN g/dL 13.0   HEMATOCRIT % 39.9   PLATELETS K/uL 183       Results from last 7 days  Lab Units 10/30/18  0826   SODIUM mEQ/L 137   POTASSIUM mEQ/L 3.9   CHLORIDE mEQ/L 107   CO2 mEQ/L 25   BUN mg/dL 10   CREATININE mg/dL 0.7   GLUCOSE mg/dL 93   CALCIUM mg/dL 9.5        Vertigo   Assessment & Plan    Admit to tele    complicated migraine / cva   Check MRI    neuro check    neuro to see    check fasting lipids and start asa   cont statins          High cholesterol   Assessment & Plan    Fasting lipids    goal LDL less than 70             VTE Assessment: Padua VTE Score: 0  VTE Prophylaxis Plan: early ambulation  Code Status: full code  Estimated Discharge Date: 11/1/2018  Disposition Planning: home     Silvia Dietrich MD  10/30/2018

## 2018-10-30 NOTE — CONSULTS
Neurology Consult Note    Subjective     Orly Lara is a 48 y.o. female who was admitted for recurrent vertigo.      Review of Systems     Constitutional: negative  Eyes: negative  Cardiovascular: negative  Musculoskeletal:negative    Allergies: Patient has no known allergies.    Current Facility-Administered Medications   Medication Dose Route Frequency Provider Last Rate Last Dose   • aspirin enteric coated tablet 81 mg  81 mg oral Daily Silvia Dietrich MD         Current Outpatient Prescriptions   Medication Sig Dispense Refill   • Lactobacillus acidophilus (PROBIOTIC ORAL) Take 1 capsule by mouth daily.     • acyclovir (ZOVIRAX) 400 mg tablet Take 400 mg by mouth 3 (three) times a day as needed (for 7 days for cold sore outbreak).       • atorvastatin (LIPITOR) 20 mg tablet Take 1 tablet (20 mg total) by mouth daily. (Patient taking differently: Take 20 mg by mouth nightly.  ) 90 tablet 0       Medical History:   Past Medical History:   Diagnosis Date   • Hypertension    • Positional vertigo    • Stroke (CMS/HCC) (HCC)        Surgical History:   Past Surgical History:   Procedure Laterality Date   • HYSTEROSCOPY W/ ENDOMETRIAL ABLATION         Social History:   Social History     Social History   • Marital status:      Spouse name: N/A   • Number of children: N/A   • Years of education: N/A     Social History Main Topics   • Smoking status: Former Smoker   • Smokeless tobacco: Never Used   • Alcohol use Yes   • Drug use: Unknown   • Sexual activity: Not Asked     Other Topics Concern   • None     Social History Narrative    Do you wear your seatbelt? Yes    Do you have smoke detector in your home? Yes    Do you have a carbon monoxide detector in your home? 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    Current Marital Status?            Family History: History reviewed. No pertinent family history.    Objective     Physical Exam    General appearance: alert and cooperative  Eyes:  conjunctivae clear. PERRL, EOM's intact.  Extremities: extremities normal, warm and well-perfused; no cyanosis, clubbing, or edema  Skin: Skin color, texture, turgor normal. No rashes or lesions  Neurologic: Grossly normal    Muusculoskeletal: no injury or deformity  Skin is warm and dry  Normocepalic, atraumatic  Alert and oriented X3  Cn: EOMI , VFF, Fundi flat, face symmetric with intact sensation, tongue midline, palate upgoing bilaterally,sternocleidomastoid and trapezius muscles intact, hearing intact  Motor: 5/5 all 4 extremities  Sens: intact to light touch all 4 extremities  Cerebellar: normal finder to nose, coordination normal  Deep tendon reflexes 2+ throughout  Toes flexor bilaterally    Labs  I have reviewed the patient's labs to the time of note. No new clinical concern.    Imaging  I have independently reviewed the following studies: CT head. Significant findings are Study Result     CLINICAL HISTORY: Paresthesia, vertigo, NIH scale 1     --  IMPRESSION: No evidence of hemorrhage, mass or acute territorial infarction by  CT criteria.        Finding:    Stroke alert   Acuity: Critical  Status:  CLOSED     Critical read back was performed and results were read back by Dr. cMcullough, , on  10/30/2018 at 8:06 AM           COMMENT:     Technique: Computed tomography of the brain was performed utilizing contiguous  2.5 mm transaxial sections without intravenous contrast administration.     CT DOSE:  One or more dose reduction techniques (e.g. automated exposure  control, adjustment of the mA and/or kV according to patient size, use of  iterative reconstruction technique) utilized for this examination.     Comparison studies: None.     Postsurgical change: None.     Brain parenchyma: There is no intraparenchymal hemorrhage, mass effect, midline  shift or extra-axial fluid collection.  White matter changes: Normal for age  Ventricles, cisterns, and sulci: Normal in size and configuration.  Sella and cerebellar  tonsils: Normal in appearance.        Calvarium and extra cranial soft tissues: Normal.  Paranasal sinuses: Clear bilaterally.  Mastoid air cell: Normal  Orbits: Normal             .    Assessment and Plan    Vertigo   Assessment & Plan    Pt with h/o of BPV presents with recurrent vertigo with non-focal exam.  Tx vertigo with meclizine. Pt with h/o finding incidental CVA on previous w/u.   Check MRI brain.  Gen med eval underway.

## 2018-10-30 NOTE — ED ATTESTATION NOTE
I have personally seen and examined the patient.  I reviewed and agree with physician assistant / nurse practitioner’s assessment and plan of care, with the following exceptions: None  My examination, assessment, and plan of care of Orly Lara is as follows:     Exam: Well-appearing, no acute distress, awake alert oriented x3, nonfocal neuro exam, normal pulses    Assessment and plan: Patient with abrupt onset of vertigo symptoms at around 7 AM while driving, she also felt some left upper extremity weakness, vertigo symptoms have improved, patient has a history of a cerebellar stroke as well as BPPV, stroke alert initiated, patient not a TPA candidate, will give aspirin and hospitalized for further evaluation and treatment    Critical care: 30 minutes     Jorge Mccullough, DO  10/30/18 0994

## 2018-10-30 NOTE — PLAN OF CARE
"Problem: Patient Care Overview  Goal: Plan of Care Review  Outcome: Ongoing (interventions implemented as appropriate)   10/30/18 4612   Coping/Psychosocial   Plan Of Care Reviewed With patient;spouse   Plan of Care Review   Progress improving   Outcome Summary Pt states she is feeling less weak this afternoon but still complaining of a \"funny\" feeling on left side of head. Pt currently at MRI. Will continue to monitor.         "

## 2018-10-30 NOTE — ASSESSMENT & PLAN NOTE
Pt with h/o of BPV presents with recurrent vertigo with non-focal exam.  Tx vertigo with meclizine. Pt with h/o finding incidental CVA on previous w/u.   Check MRI brain.  Gen med eval underway.

## 2018-10-30 NOTE — ASSESSMENT & PLAN NOTE
Monitor on telemetry   Vertigo resolved per patient.   Appreciate neurology consultation - likely TIA   MRI with old cerebellar stroke, no acute intracranial abnormality or acute infarct   Will continue ASA 81mg at discharge and increase lipitor   Outpatient follow-up with neurology

## 2018-10-31 VITALS
RESPIRATION RATE: 16 BRPM | HEIGHT: 63 IN | TEMPERATURE: 97.7 F | DIASTOLIC BLOOD PRESSURE: 84 MMHG | HEART RATE: 72 BPM | BODY MASS INDEX: 29.95 KG/M2 | SYSTOLIC BLOOD PRESSURE: 138 MMHG | OXYGEN SATURATION: 96 % | WEIGHT: 169 LBS

## 2018-10-31 LAB
ATRIAL RATE: 65
CHOLEST SERPL-MCNC: 178 MG/DL
HDLC SERPL-MCNC: 51 MG/DL
HDLC SERPL: 3.5 {RATIO}
LDLC SERPL CALC-MCNC: 102 MG/DL
NONHDLC SERPL-MCNC: 127 MG/DL
P AXIS: 28
PR INTERVAL: 166
QRS DURATION: 86
QT INTERVAL: 374
QTC CALCULATION(BAZETT): 388
R AXIS: 4
T WAVE AXIS: 8
TRIGL SERPL-MCNC: 123 MG/DL (ref 30–149)
VENTRICULAR RATE: 65

## 2018-10-31 PROCEDURE — 36415 COLL VENOUS BLD VENIPUNCTURE: CPT | Performed by: HOSPITALIST

## 2018-10-31 PROCEDURE — G8980 MOBILITY D/C STATUS: HCPCS | Mod: GP,GO,GN,CH

## 2018-10-31 PROCEDURE — G0378 HOSPITAL OBSERVATION PER HR: HCPCS

## 2018-10-31 PROCEDURE — G8979 MOBILITY GOAL STATUS: HCPCS | Mod: GP,GO,GN,CH

## 2018-10-31 PROCEDURE — 63700000 HC SELF-ADMINISTRABLE DRUG: Performed by: HOSPITALIST

## 2018-10-31 PROCEDURE — 99239 HOSP IP/OBS DSCHRG MGMT >30: CPT | Performed by: HOSPITALIST

## 2018-10-31 PROCEDURE — 80061 LIPID PANEL: CPT | Performed by: HOSPITALIST

## 2018-10-31 PROCEDURE — 97161 PT EVAL LOW COMPLEX 20 MIN: CPT | Mod: GP,GO,GN

## 2018-10-31 PROCEDURE — G8978 MOBILITY CURRENT STATUS: HCPCS | Mod: GP,GO,GN,CH

## 2018-10-31 RX ORDER — ASPIRIN 81 MG/1
81 TABLET ORAL DAILY
Qty: 89 TABLET | Refills: 0
Start: 2018-11-01 | End: 2019-02-14 | Stop reason: HOSPADM

## 2018-10-31 RX ORDER — ATORVASTATIN CALCIUM 40 MG/1
40 TABLET, FILM COATED ORAL NIGHTLY
Qty: 30 TABLET | Refills: 0 | Status: SHIPPED | OUTPATIENT
Start: 2018-10-31 | End: 2019-01-10 | Stop reason: SDUPTHER

## 2018-10-31 RX ADMIN — PROBIOTIC PRODUCT - TAB 1 TABLET: TAB at 08:07

## 2018-10-31 RX ADMIN — ASPIRIN 81 MG: 81 TABLET, COATED ORAL at 08:07

## 2018-10-31 ASSESSMENT — COGNITIVE AND FUNCTIONAL STATUS - GENERAL
STANDING UP FROM CHAIR USING ARMS: 4 - NONE
MOVING TO AND FROM BED TO CHAIR: 4 - NONE
WALKING IN HOSPITAL ROOM: 4 - NONE
CLIMB 3 TO 5 STEPS WITH RAILING: 4 - NONE

## 2018-10-31 NOTE — PROGRESS NOTES
Study Result     STUDY:  MRI of the Brain without contrast     CLINICAL HISTORY: Vertigo.     --  IMPRESSION:  1.  No acute intracranial abnormality, no acute infarct.  2.  Old right cerebellar infarct.  3.  Nonspecific but stable subcortical white matter T2 hyperintensities, upper  limits of normal for age.  4.  Mucosal disease of paranasal sinuses worsened since prior exam.     COMMENT:  Technique:  The brain was scanned in multiple planes with a variety of pulse  sequences without contrast.     Comparison:  Head CT dated 10/30/2018.  Brain MRI dated 8/16/2016.     Findings:  There is an old small right cerebellar infarct.  No new areas of  restricted diffusion.  There are minimal scattered subcortical white matter T2  hyperintensities, within upper limits of normal for age, similar to the previous  examination.  Sulci, ventricles and basal cisterns are within normal limits for  patient's stated age. No mass-effect, intracranial hemorrhage, acute segmental  infarct or extra-axial fluid collection is seen. Cerebellar tonsils are normal  in position.  Expected signal voids are seen in the intracranial vessels at the  skull base.     The orbits and sella are unremarkable.   The paranasal sinuses demonstrates a  large mucous retention cyst versus sinonasal polyp of the left maxillary sinus,  scattered nodular mucosal thickening of the right maxillary sinus.  There is  also scattered mucosal thickening of the anterior ethmoid air cells  bilaterally..  The mastoid air cells are clear.     I personally reviewed MRI.  Ok for d/c from neuro standpoint

## 2018-10-31 NOTE — PLAN OF CARE
Problem: Patient Care Overview  Goal: Plan of Care Review  Outcome: Ongoing (interventions implemented as appropriate)   10/31/18 1112   Coping/Psychosocial   Plan Of Care Reviewed With patient   Plan of Care Review   Progress no change   Outcome Summary Pt denies vertigo, MRI pending. Possible DC today.       Problem: Sensory/Perceptual Alteration (Adult)  Goal: Identify Related Risk Factors and Signs and Symptoms  Outcome: Outcome(s) Achieved Date Met: 10/31/18    Goal: Functional Ability/Safety  Outcome: Ongoing (interventions implemented as appropriate)    Goal: Balanced Response to Sensory Input  Outcome: Ongoing (interventions implemented as appropriate)

## 2018-10-31 NOTE — PROGRESS NOTES
Patient: Orly Lara  Location: [facility] Telemetry 0468  MRN: [id number]  Today's date: 10/31/2018        HR 62 at rest  Patient returned to bed, call bell and belongings in reach. Independent w/ functional mobility, therefore, PT will d/c PT order. No needs at d/c.    Prior Living Environment  Lives With: spouse, child(gen), dependent  Living Environment Comment: lives in split level home w/  children, 2nd floor bed/bath, Equipment Currently Used at Home: none       Prior Level of Function  Ambulation: independent  Transferring: independent  Toileting: independent  Bathing: independent  Dressing: independent  Eating: independent  Communication: understands/communicates without difficulty  Swallowing: swallows foods/liquids without difficulty  Equipment Currently Used at Home: none  Prior Functional Level Comment: patient is a [employer] at [employer]           PT Evaluation - 10/31/18 0927        Session Details    Document Type initial evaluation    Mode of Treatment physical therapy       Time Calculation    Start Time 0811    Stop Time 0825    Time Calculation (min) 14 min       General Information    Patient Profile Reviewed? yes    Pertinent History of Current Functional Problem vertigo while driving to work, pulled over and called 911.  Hx of CVA, HLD on Lipitor    Existing Precautions/Restrictions fall       Orientation Log    Comment AAOx3       Cognition/Psychosocial    Safety Awareness intact       Attention    Behavioral Observations WNL, no concerns       Sensory    Sensory General Assessment other (see comments)   mild tingling in L posterior head       Range of Motion (ROM)    General Range of Motion no range of motion deficits identified       Manual Muscle Testing (MMT)    General MMT Assessment no strength deficits identified    Comment L hand feels heavy, no strength deficit       Bed Mobility    Bed Mobility bed mobility activities    [place], All Bed Mobility Activities  independent       Bed to Chair Transfer    [place], Bed to Chair independent       Chair to Bed Transfer    [place], Chair to Bed independent       Sit to Stand Transfer    [place], Sit to Stand Transfer independent       Stand to Sit Transfer    [place], Stand to Sit Transfer independent       Transfer Safety    Special Circumstances identify circumstances, use of transfer assist devices       Gait Training    [place], Gait independent    Distance in Feet 300 feet    Gait Pattern Utilized step-through    Comment mild lightheadedness when changing directions quickly       Stairs Training    [place], Stairs independent    Handrail Location left side (ascending);right side (descending)    Number of Stairs 5    Ascending Stairs Technique step-over-step    Descending Stairs Technique step-to-step    Comment safe on stairs       AM-PAC (TM) - Mobility (Current Function)    Turning from your back to your side while in a flat bed without using bedrails? 4 - None    Moving from lying on your back to sitting on the side of a flat bed without using bedrails? 4 - None    Moving to and from a bed to a chair? 4 - None    Standing up from a chair using your arms? 4 - None    To walk in a hospital room? 4 - None    Climbing 3-5 steps with a railing? 4 - None    AM-PAC (TM) Mobility Score 24       PT Clinical Impression    Patient's Goals For Discharge return to all previous roles/activities    Plan For Care Reviewed: Physical Therapy patient voices agreement with PT plan for care    Impairments Found (PT Eval) other (see comments)   no appreciable impairments that would limit her functional    Functional Limitations in Following Categories (PT Eval) other (see comments)   none    Rehab Potential/Prognosis other (see comments)   no further acute setting PT needs    Anticipated Equipment Needs at Discharge none    Expected Discharge Disposition home    Daily Outcome Statement no further PT needs, will  d/c PT                        Education provided this session. See the Patient Education summary report for full details.

## 2018-10-31 NOTE — PLAN OF CARE
Problem: Patient Care Overview  Goal: Plan of Care Review  Outcome: Adequate for Discharge   10/31/18 0936   Coping/Psychosocial   Plan Of Care Reviewed With patient   Plan of Care Review   Progress progress toward functional goals as   10/31/18 0936   Coping/Psychosocial   Plan Of Care Reviewed With patient   Plan of Care Review   Progress progress toward functional goals as expected    expected     Goal: Individualization & Mutuality  Outcome: Adequate for Discharge

## 2018-10-31 NOTE — PLAN OF CARE
Problem: Patient Care Overview  Goal: Discharge Needs Assessment  Outcome: Ongoing (interventions implemented as appropriate)   10/31/18 1013   DC Needs Assessment   Concerns To Be Addressed denies needs/concerns at this time   Readmission Within The Last 30 Days no previous admission in last 30 days   Anticipated Discharge Disposition home without services   Equipment Needed After Discharge none   Discharge Planning Comments indep from home with spouse

## 2018-10-31 NOTE — PROGRESS NOTES
Neurology Daily Progress Note    Subjective/Objective:  Subjective     Interval History: no events    Objective     Physical Exam:  General appearance: alert, appears stated age and cooperative  Neurologic:   Cranial Nerves: 2-12 intact  Motor: 5/5 all extremities  Sensory: intact to light touch all extremities  Cerebellar: normal coordination    Vital signs in last 24 hours:  Temp:  [36.4 °C (97.6 °F)-37 °C (98.6 °F)] 36.5 °C (97.7 °F)  Heart Rate:  [55-82] 72  Resp:  [16-20] 18  BP: (124-148)/(79-90) 124/84      Intake/Output Summary (Last 24 hours) at 10/31/18 0924  Last data filed at 10/30/18 1917   Gross per 24 hour   Intake              600 ml   Output                0 ml   Net              600 ml       Labs  I have reviewed the patient's labs to the time of note. No new clinical concern.    Imaging  Not applicable                  Assessment/Plan   Vertigo   Assessment & Plan    Pt with h/o of BPV presents with recurrent vertigo with non-focal exam.  Tx vertigo with meclizine. Pt with h/o finding incidental CVA on previous w/u.   Check MRI brain.  Gen med eval underway.                 Expected Discharge Date:  11/1/2018

## 2018-10-31 NOTE — SUBJECTIVE & OBJECTIVE
Subjective     Interval History: no events    Objective     Physical Exam:  General appearance: alert, appears stated age and cooperative  Neurologic:   Cranial Nerves: 2-12 intact  Motor: 5/5 all extremities  Sensory: intact to light touch all extremities  Cerebellar: normal coordination    Vital signs in last 24 hours:  Temp:  [36.4 °C (97.6 °F)-37 °C (98.6 °F)] 36.5 °C (97.7 °F)  Heart Rate:  [55-82] 72  Resp:  [16-20] 18  BP: (124-148)/(79-90) 124/84      Intake/Output Summary (Last 24 hours) at 10/31/18 0924  Last data filed at 10/30/18 1917   Gross per 24 hour   Intake              600 ml   Output                0 ml   Net              600 ml       Labs  I have reviewed the patient's labs to the time of note. No new clinical concern.    Imaging  Not applicable

## 2018-10-31 NOTE — PROGRESS NOTES
Hospital Medicine Service -  Inpatient Discharge Summary        BRIEF OVERVIEW   Admitting Provider: Silvia Dietrich MD  Attending Provider: Alan Franklin MD Attending phys phone: [phone]    PCP: Bethany Celestin CRNP [phone]    Admission Date: 10/30/2018  Discharge Date: 10/31/2018     DISCHARGE DIAGNOSES      Primary Discharge Diagnosis  No Principal Problem: There is no principal problem currently on the Problem List. Please update the Problem List and refresh.    Secondary Discharge Diagnoses  Active Hospital Problems    Diagnosis Date Noted   • Vertigo 10/30/2018   • High cholesterol 11/11/2014      Resolved Hospital Problems    Diagnosis Date Noted Date Resolved   No resolved problems to display.       Problem List on Day of Discharge  Vertigo   Assessment & Plan    Monitor on telemetry   Vertigo resolved per patient.   Appreciate neurology consultation - likely TIA   MRI with old cerebellar stroke, no acute intracranial abnormality or acute infarct   Will continue ASA 81mg at discharge and increase lipitor   Outpatient follow-up with neurology           High cholesterol   Assessment & Plan    Lipid panel reviewed and discussed with patient and spouse.   , goal less than 70  Will increase Lipitor to 40mg at discharge    Continue diet modification          SUMMARY OF HOSPITALIZATION      Presenting Problem/History of Present Illness  Vertigo    This is a 48 y.o. year-old female admitted on 10/30/2018 with Vertigo [R42].    Orly Lara is a 48 y.o. female with a past medical history of cerebellar stroke , BPV and hyperlipemia remote smoking , family h/o premature heart dz and strokes who presents with mild vertigo and numbness of l side of head , face  And weakness of L upper extremity  since morning .She is avoiding to move to much 2/2 vertigo like symptoms .She reports having headache  , which resolved with Excedrin all last week. She denies any flasing lights and any other  visual symptoms . Denies any fever,  nausea and vomiting. She feels that this is different than her usual vertigo .   Denies any other focal neurological symptoms.Denies any head and neck trauma and    weight lifting .She denies any chest pain , sob,palp , and dizziness.     Hospital Course  Orly Lara was admitted to [facility] on 10/30 with chief complaint of vertigo and numbness of the L side of head, face, and weakness of LUE. Patient was monitored on telemetry and seen in consultation by neurology. CTH was negative. She had an MRI brain which showed old cerebellar stroke but no acute abnormality. Lipid panel was drawn and LDL was noted to be 102. Symptoms likely 2/2 TIA per neurology. Patient was started on ASA 81mg which will continue at discharge, she admittedly had stopped taking this as an outpatient and Lipitor was increased to 40mg daily. Patient is stable for discharge with close follow-up.     Exam on Day of Discharge  Physical Exam   Constitutional: She is oriented to person, place, and time. She appears well-developed and well-nourished. No distress.   HENT:   Head: Normocephalic and atraumatic.   Eyes: Pupils are equal, round, and reactive to light.   Neck: Normal range of motion.   Cardiovascular: Normal rate and regular rhythm.    Pulmonary/Chest: Effort normal and breath sounds normal. No respiratory distress.   Abdominal: Soft. Bowel sounds are normal. She exhibits no distension. There is no tenderness.   Musculoskeletal: Normal range of motion. She exhibits no edema.   Neurological: She is alert and oriented to person, place, and time.   Skin: Skin is warm and dry.   Psychiatric: She has a normal mood and affect. Her behavior is normal.       Consults During Admission  IP CONSULT TO NEUROLOGY    DISCHARGE MEDICATIONS        Medication List      START taking these medications    aspirin 81 mg enteric coated tablet  Take 1 tablet (81 mg total) by mouth daily for 89 doses.  Start taking on:  11/1/2018         CHANGE how you take these medications    atorvastatin 40 mg tablet  Commonly known as:  LIPITOR  Take 1 tablet (40 mg total) by mouth nightly.  What changed:  · medication strength  · how much to take  · when to take this        CONTINUE taking these medications    acyclovir 400 mg tablet  Commonly known as:  ZOVIRAX  Take 400 mg by mouth 3 (three) times a day as needed (for 7 days for cold sore outbreak).     PROBIOTIC ORAL  Take 1 capsule by mouth daily.            Instructions for after discharge     Call provider for:  difficulty breathing, headache or visual disturbances       Call provider for:  persistent dizziness or light-headedness       Diet       Diet Type:  Low Fat    Follow-up with Provider:       Instructions for follow-up:  Follow-up in the next 1-2 weeks. Call for an appointment.    Follow-up with primary physician (PCP)       Instructions for follow-up:  Follow-up within 1-2 weeks of discharge. Call for an appointment.    Post-Discharge Activity: Normal activity as tolerated.       Normal activity as tolerated.             PROCEDURES / LABS / IMAGING      Operative Procedures  None.     Other Procedures  none    Pertinent Labs    Results from last 7 days  Lab Units 10/30/18  0826   WBC K/uL 5.78   HEMOGLOBIN g/dL 13.0   HEMATOCRIT % 39.9   PLATELETS K/uL 183       Results from last 7 days  Lab Units 10/30/18  0826   SODIUM mEQ/L 137   POTASSIUM mEQ/L 3.9   CHLORIDE mEQ/L 107   CO2 mEQ/L 25   BUN mg/dL 10   CREATININE mg/dL 0.7   GLUCOSE mg/dL 93   CALCIUM mg/dL 9.5         Pertinent Imaging  Mri Brain Without Contrast    Result Date: 10/31/2018  IMPRESSION: 1.  No acute intracranial abnormality, no acute infarct. 2.  Old right cerebellar infarct. 3.  Nonspecific but stable subcortical white matter T2 hyperintensities, upper limits of normal for age. 4.  Mucosal disease of paranasal sinuses worsened since prior exam. COMMENT: Technique:  The brain was scanned in multiple planes with a variety  of pulse sequences without contrast. Comparison:  Head CT dated 10/30/2018.  Brain MRI dated 8/16/2016. Findings:  There is an old small right cerebellar infarct.  No new areas of restricted diffusion.  There are minimal scattered subcortical white matter T2 hyperintensities, within upper limits of normal for age, similar to the previous examination.  Sulci, ventricles and basal cisterns are within normal limits for patient's stated age. No mass-effect, intracranial hemorrhage, acute segmental infarct or extra-axial fluid collection is seen. Cerebellar tonsils are normal in position.  Expected signal voids are seen in the intracranial vessels at the skull base. The orbits and sella are unremarkable.   The paranasal sinuses demonstrates a large mucous retention cyst versus sinonasal polyp of the left maxillary sinus, scattered nodular mucosal thickening of the right maxillary sinus.  There is also scattered mucosal thickening of the anterior ethmoid air cells bilaterally..  The mastoid air cells are clear.    X-ray Chest 1 View    Result Date: 10/30/2018  IMPRESSION:   Radiographically normal chest. COMMENT:   A single portable AP erect chest radiograph is interpreted without comparison. The cardiomediastinal contour is normal. The lungs are clear bilaterally. The osseous thorax and surrounding soft tissues are unremarkable.     Ct Stroke Alert    Result Date: 10/30/2018  IMPRESSION: No evidence of hemorrhage, mass or acute territorial infarction by CT criteria. Finding:    Stroke alert   Acuity: Critical  Status:  CLOSED Critical read back was performed and results were read back by Dr. Mccullough, , on 10/30/2018 at 8:06 AM COMMENT: Technique: Computed tomography of the brain was performed utilizing contiguous 2.5 mm transaxial sections without intravenous contrast administration. CT DOSE:  One or more dose reduction techniques (e.g. automated exposure control, adjustment of the mA and/or kV according to patient size,  use of iterative reconstruction technique) utilized for this examination. Comparison studies: None. Postsurgical change: None. Brain parenchyma: There is no intraparenchymal hemorrhage, mass effect, midline shift or extra-axial fluid collection. White matter changes: Normal for age Ventricles, cisterns, and sulci: Normal in size and configuration. Sella and cerebellar tonsils: Normal in appearance. Calvarium and extra cranial soft tissues: Normal. Paranasal sinuses: Clear bilaterally. Mastoid air cell: Normal Orbits: Normal       OUTPATIENT  FOLLOW-UP / REFERRALS / PENDING TESTS        Outpatient Follow-Up Appointments  Encounter Information     You do not currently have any appointments scheduled.          Referrals  No orders of the defined types were placed in this encounter.      Test Results Pending at Discharge  Unresulted Labs     None          Important Issues to Address in Follow-Up  Lipitor increased. Follow-up with neurology    DISCHARGE DISPOSITION      Disposition: Home     Code Status At Discharge: Full Code    Physician Order for Life-Sustaining Treatment Document Status      No documents found

## 2018-10-31 NOTE — DISCHARGE SUMMARY
Hospital Medicine Service -  Inpatient Discharge Summary        BRIEF OVERVIEW   Admitting Provider: Silvia Dietrich MD  Attending Provider: Alan Franklin MD Attending phys phone: [phone]    PCP: Bethany Celestin CRNP [phone]    Admission Date: 10/30/2018  Discharge Date: 10/31/2018     DISCHARGE DIAGNOSES      Primary Discharge Diagnosis  No Principal Problem: There is no principal problem currently on the Problem List. Please update the Problem List and refresh.    Secondary Discharge Diagnoses  Active Hospital Problems    Diagnosis Date Noted   • Vertigo 10/30/2018   • High cholesterol 11/11/2014      Resolved Hospital Problems    Diagnosis Date Noted Date Resolved   No resolved problems to display.       Problem List on Day of Discharge  Vertigo   Assessment & Plan    Monitor on telemetry   Vertigo resolved per patient.   Appreciate neurology consultation - likely TIA   MRI with old cerebellar stroke, no acute intracranial abnormality or acute infarct   Will continue ASA 81mg at discharge and increase lipitor   Outpatient follow-up with neurology           High cholesterol   Assessment & Plan    Lipid panel reviewed and discussed with patient and spouse.   , goal less than 70  Will increase Lipitor to 40mg at discharge    Continue diet modification          SUMMARY OF HOSPITALIZATION      Presenting Problem/History of Present Illness  Vertigo    This is a 48 y.o. year-old female admitted on 10/30/2018 with Vertigo [R42].    Orly Lara is a 48 y.o. female with a past medical history of cerebellar stroke , BPV and hyperlipemia remote smoking , family h/o premature heart dz and strokes who presents with mild vertigo and numbness of l side of head , face  And weakness of L upper extremity  since morning .She is avoiding to move to much 2/2 vertigo like symptoms .She reports having headache  , which resolved with Excedrin all last week. She denies any flasing lights and any other  visual symptoms . Denies any fever,  nausea and vomiting. She feels that this is different than her usual vertigo .   Denies any other focal neurological symptoms.Denies any head and neck trauma and    weight lifting .She denies any chest pain , sob,palp , and dizziness     Hospital Course  Orly Lara was admitted to [facility] on 10/30 with chief complaint of vertigo and numbness of the L side of head, face, and weakness of LUE. Patient was monitored on telemetry and seen in consultation by neurology. CTH was negative. She had an MRI brain which showed old cerebellar stroke but no acute abnormality. Lipid panel was drawn and LDL was noted to be 102. Symptoms likely 2/2 TIA per neurology. Patient was started on ASA 81mg which will continue at discharge, she admittedly had stopped taking this as an outpatient and Lipitor was increased to 40mg daily. Patient is stable for discharge with close follow-up.      Exam on Day of Discharge  Physical Exam   Constitutional: She is oriented to person, place, and time. She appears well-developed and well-nourished.   HENT:   Head: Normocephalic and atraumatic.   Eyes: Pupils are equal, round, and reactive to light.   Neck: Normal range of motion.   Cardiovascular: Normal rate and regular rhythm.    Pulmonary/Chest: Effort normal and breath sounds normal. No respiratory distress.   Abdominal: Soft. Bowel sounds are normal. She exhibits no distension. There is no tenderness.   Musculoskeletal: Normal range of motion.   Neurological: She is alert and oriented to person, place, and time.   Skin: Skin is warm.   Psychiatric: She has a normal mood and affect. Her behavior is normal.       Consults During Admission  IP CONSULT TO NEUROLOGY    DISCHARGE MEDICATIONS        Medication List      START taking these medications    aspirin 81 mg enteric coated tablet  Take 1 tablet (81 mg total) by mouth daily for 89 doses.  Start taking on:  11/1/2018        CHANGE how you take these medications     atorvastatin 40 mg tablet  Commonly known as:  LIPITOR  Take 1 tablet (40 mg total) by mouth nightly.  What changed:  · medication strength  · how much to take  · when to take this        CONTINUE taking these medications    acyclovir 400 mg tablet  Commonly known as:  ZOVIRAX  Take 400 mg by mouth 3 (three) times a day as needed (for 7 days for cold sore outbreak).     PROBIOTIC ORAL  Take 1 capsule by mouth daily.            Instructions for after discharge     Call provider for:  difficulty breathing, headache or visual disturbances       Call provider for:  persistent dizziness or light-headedness       Diet       Diet Type:  Low Fat    Follow-up with Provider:       Instructions for follow-up:  Follow-up in the next 1-2 weeks. Call for an appointment.    Follow-up with primary physician (PCP)       Instructions for follow-up:  Follow-up within 1-2 weeks of discharge. Call for an appointment.    Post-Discharge Activity: Normal activity as tolerated.       Normal activity as tolerated.             PROCEDURES / LABS / IMAGING      Operative Procedures  None.     Other Procedures  none    Pertinent Labs    Results from last 7 days  Lab Units 10/30/18  0826   WBC K/uL 5.78   HEMOGLOBIN g/dL 13.0   HEMATOCRIT % 39.9   PLATELETS K/uL 183       Results from last 7 days  Lab Units 10/30/18  0826   SODIUM mEQ/L 137   POTASSIUM mEQ/L 3.9   CHLORIDE mEQ/L 107   CO2 mEQ/L 25   BUN mg/dL 10   CREATININE mg/dL 0.7   GLUCOSE mg/dL 93   CALCIUM mg/dL 9.5       Results from last 7 days  Lab Units 10/31/18  0806   CHOLESTEROL mg/dL 178   TRIGLYCERIDES mg/dL 123   HDL mg/dL 51*   LDL CALC mg/dL 102*       Pertinent Imaging  Mri Brain Without Contrast    Result Date: 10/31/2018  IMPRESSION: 1.  No acute intracranial abnormality, no acute infarct. 2.  Old right cerebellar infarct. 3.  Nonspecific but stable subcortical white matter T2 hyperintensities, upper limits of normal for age. 4.  Mucosal disease of paranasal sinuses  worsened since prior exam. COMMENT: Technique:  The brain was scanned in multiple planes with a variety of pulse sequences without contrast. Comparison:  Head CT dated 10/30/2018.  Brain MRI dated 8/16/2016. Findings:  There is an old small right cerebellar infarct.  No new areas of restricted diffusion.  There are minimal scattered subcortical white matter T2 hyperintensities, within upper limits of normal for age, similar to the previous examination.  Sulci, ventricles and basal cisterns are within normal limits for patient's stated age. No mass-effect, intracranial hemorrhage, acute segmental infarct or extra-axial fluid collection is seen. Cerebellar tonsils are normal in position.  Expected signal voids are seen in the intracranial vessels at the skull base. The orbits and sella are unremarkable.   The paranasal sinuses demonstrates a large mucous retention cyst versus sinonasal polyp of the left maxillary sinus, scattered nodular mucosal thickening of the right maxillary sinus.  There is also scattered mucosal thickening of the anterior ethmoid air cells bilaterally..  The mastoid air cells are clear.    X-ray Chest 1 View    Result Date: 10/30/2018  IMPRESSION:   Radiographically normal chest. COMMENT:   A single portable AP erect chest radiograph is interpreted without comparison. The cardiomediastinal contour is normal. The lungs are clear bilaterally. The osseous thorax and surrounding soft tissues are unremarkable.     Ct Stroke Alert    Result Date: 10/30/2018  IMPRESSION: No evidence of hemorrhage, mass or acute territorial infarction by CT criteria. Finding:    Stroke alert   Acuity: Critical  Status:  CLOSED Critical read back was performed and results were read back by Dr. Mccullough, , on 10/30/2018 at 8:06 AM COMMENT: Technique: Computed tomography of the brain was performed utilizing contiguous 2.5 mm transaxial sections without intravenous contrast administration. CT DOSE:  One or more dose reduction  techniques (e.g. automated exposure control, adjustment of the mA and/or kV according to patient size, use of iterative reconstruction technique) utilized for this examination. Comparison studies: None. Postsurgical change: None. Brain parenchyma: There is no intraparenchymal hemorrhage, mass effect, midline shift or extra-axial fluid collection. White matter changes: Normal for age Ventricles, cisterns, and sulci: Normal in size and configuration. Sella and cerebellar tonsils: Normal in appearance. Calvarium and extra cranial soft tissues: Normal. Paranasal sinuses: Clear bilaterally. Mastoid air cell: Normal Orbits: Normal       OUTPATIENT  FOLLOW-UP / REFERRALS / PENDING TESTS        Outpatient Follow-Up Appointments  Encounter Information     You do not currently have any appointments scheduled.          Referrals  No orders of the defined types were placed in this encounter.      Test Results Pending at Discharge  Unresulted Labs     None          Important Issues to Address in Follow-Up  Follow-up with neurology.     DISCHARGE DISPOSITION      Disposition: Home     Code Status At Discharge: Full Code    Physician Order for Life-Sustaining Treatment Document Status      No documents found

## 2018-11-01 ENCOUNTER — PATIENT OUTREACH (OUTPATIENT)
Dept: CASE MANAGEMENT | Facility: CLINIC | Age: 48
End: 2018-11-01

## 2018-11-01 NOTE — PROGRESS NOTES
NAME: Orly Lara    MRN: [id number]    YOB: 1970    EVENT DETAILS    Discharging Facility: [facility]  Date of Admission: 10/30/18  Date of Discharge: 10/31/18  Discharge Instructions Reviewed?: Yes         Reason for Admission:  Vertigo, TIA      HPI: Spoke with patient. She went to the [facility] with vertigo, left face, head numbness & left arm weakness. MRI revealed no acute changes, old cerebellar stroke.     Aspirin 81 mg was resumed and Lipitor increased to 40 mg daily.    Patient stated that she is feeling much better. The left sided weakness & numbness has resolved. She is still reporting intermittent vertigo but the symptom is not as severe.     Patient is not reporting abnormal cardiac, respiratory, G I or G U symptoms.    Patient is tolerating food and fluids. Patient stated that she has been following a low fat diet.    Patient will return to work next week.    MEDICATION REVIEW:    Reported by:: Patient  Prescriptions Filled?: Yes     Was a medication discrepancy indentified?: No     Medication understanding?: Yes     Medication Adherence?: Yes     Any side effects from medication?: No       Medication review Reviewed, see medication history    Additional Comments: N/A      FOLLOW-UP TESTS/PROCEDURES: N/A        HOME MANAGEMENT    Living Arrangement: Spouse or Significant Other       HOME CARE SERVICES    Receiving Home Care Services: No          DURABLE MEDICAL EQUIPMENT    Durable Medical Equipment: None  Oxygen Use: No         BARRIERS TO CARE    SELF-CARE    Living Arrangement: Spouse or Significant Other       SOCIOECONOMIC    Financial Problems?: No     Transportation Issues?: No          INTERVENTION/CARE COORDINATION    Interventions/ Care Coordination: Assisted patient in the scheduling of an appointment     PCP Appt:  11/07/2018    Specialist Appt.: Neurologist; to call / schedule    PLAN OF CARE:    Reviewed signs/symptoms of worsening condition or complication that  necessitate a call to the Physician's office.  Educated patient on access to care.  RN phone number given for future care management needs.       Simran Blanco RN

## 2018-11-07 ENCOUNTER — OFFICE VISIT (OUTPATIENT)
Dept: FAMILY MEDICINE | Facility: CLINIC | Age: 48
End: 2018-11-07
Payer: COMMERCIAL

## 2018-11-07 VITALS
SYSTOLIC BLOOD PRESSURE: 121 MMHG | HEIGHT: 63 IN | BODY MASS INDEX: 29.88 KG/M2 | OXYGEN SATURATION: 98 % | HEART RATE: 63 BPM | TEMPERATURE: 97.4 F | WEIGHT: 168.6 LBS | DIASTOLIC BLOOD PRESSURE: 83 MMHG

## 2018-11-07 DIAGNOSIS — I63.9 CEREBROVASCULAR ACCIDENT (CVA), UNSPECIFIED MECHANISM (CMS/HCC): ICD-10-CM

## 2018-11-07 DIAGNOSIS — G45.9 TIA (TRANSIENT ISCHEMIC ATTACK): ICD-10-CM

## 2018-11-07 DIAGNOSIS — Z78.9 TRANSITION OF CARE PERFORMED WITH SHARING OF CLINICAL SUMMARY: Primary | ICD-10-CM

## 2018-11-07 DIAGNOSIS — H81.12 BENIGN PAROXYSMAL POSITIONAL VERTIGO OF LEFT EAR: ICD-10-CM

## 2018-11-07 PROCEDURE — 99495 TRANSJ CARE MGMT MOD F2F 14D: CPT | Performed by: NURSE PRACTITIONER

## 2018-11-07 NOTE — PROGRESS NOTES
Subjective      Patient ID: Orly Lara is a 48 y.o. female.  1970      Pt presents today for TCM. She reported to the ER at [facility] on 10/30 with c/o dizziness, and numbness on left side of head, faced and left arm weakness. She notes that the dizziness felt different from that of her typical vertigo s/sx. CT Head was normal and no acute changes on her MRI. She was seen by Neurology and it was felt that her symptoms were caused by TIA. Pt is well today without complaints. She has no residual deficits. She does feel more forgetful. She has a follow up appt scheduled with Dr. Hidalgo at [place] on Friday 11/9. Old CVA thought to be cryptogenic. When she had seen Dr. Estrada in 2016 he recommended sleep study as it was thought possibly sleep deprivation may be exacerbating her dizziness, vertigo. She did not have the testing done as she was skeptical. She has had episodes of vertigo intermittently. She had been seeing ENT but also performing her own Epley maneuvers at home which do help. No further work up had been recommended after finding chronic infarct but now after experiencing TIA she is concerned and wants answers.  Pt has significant family hx. Sister reportedly had several DVTs, Mother also had CVA, CAD/MI. Pt herself had cardiac work up with coronary calcium score and stress testing in 2015 that were both normal.  She does report that at some point, estimates back in 2014(?) at [facility] had 24 hour cardiac monitoring for c/o palps. The testing was normal and she no longer experiences those episodes of palpitations since.       Past Medical History:   Diagnosis Date   • Hypertension    • Positional vertigo    • Stroke (CMS/HCC) (HCC)      Past Surgical History:   Procedure Laterality Date   • HYSTEROSCOPY W/ ENDOMETRIAL ABLATION       History reviewed. No pertinent family history.  Social History     Social History   • Marital status:      Spouse name: N/A   • Number of children: N/A   • Years of  education: N/A     Occupational History   • Not on file.     Social History Main Topics   • Smoking status: Former Smoker   • Smokeless tobacco: Never Used   • Alcohol use Yes   • Drug use: Unknown   • Sexual activity: Not on file     Other Topics Concern   • Not on file     Social History Narrative    Do you wear your seatbelt? Yes    Do you have smoke detector in your home? Yes    Do you have a carbon monoxide detector in your home? 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    Current Marital Status?          No Known Allergies  Current Outpatient Prescriptions on File Prior to Visit   Medication Sig Dispense Refill   • acyclovir (ZOVIRAX) 400 mg tablet Take 400 mg by mouth 3 (three) times a day as needed (for 7 days for cold sore outbreak).       • aspirin 81 mg enteric coated tablet Take 1 tablet (81 mg total) by mouth daily for 89 doses. 89 tablet 0   • atorvastatin (LIPITOR) 40 mg tablet Take 1 tablet (40 mg total) by mouth nightly. 30 tablet 0   • Lactobacillus acidophilus (PROBIOTIC ORAL) Take 1 capsule by mouth daily.     • [] azithromycin (ZITHROMAX) 250 mg tablet Take 2 tablets the first day, then 1 tablet daily for 4 days. 6 tablet 0   • [] benzonatate (TESSALON PERLES) 100 mg capsule Take 1 capsule (100 mg total) by mouth 3 (three) times a day as needed for cough for up to 10 days. 30 capsule 0   • [] codeine-guaifenesin (ROBITUSSIN-AC)  mg/5 mL liquid Take 5 mL by mouth every 6 (six) hours as needed for cough for up to 10 days. 180 mL 0     No current facility-administered medications on file prior to visit.          The following have been reviewed and updated as appropriate in this visit:  Tobacco  Allergies  Meds  Problems  Med Hx  Surg Hx  Fam Hx  Soc Hx        Review of Systems   Constitutional: Negative for activity change, appetite change, chills, diaphoresis, fatigue and fever.   Respiratory: Negative for cough and shortness of breath.   "  Cardiovascular: Negative for chest pain, palpitations and leg swelling.   Neurological: Positive for dizziness. Negative for tremors, seizures, syncope, facial asymmetry, speech difficulty, weakness, light-headedness, numbness and headaches.   Hematological: Negative for adenopathy.   Psychiatric/Behavioral: Negative for agitation, behavioral problems, confusion and decreased concentration.       Objective     Vitals:    11/07/18 1520   BP: 121/83   BP Location: Right upper arm   Patient Position: Sitting   Pulse: 63   Temp: 36.3 °C (97.4 °F)   TempSrc: Oral   SpO2: 98%   Weight: 76.5 kg (168 lb 9.6 oz)   Height: 1.6 m (5' 3\")     Body mass index is 29.87 kg/m².    Physical Exam   Constitutional: She is oriented to person, place, and time. She appears well-developed and well-nourished. No distress.   HENT:   Head: Normocephalic and atraumatic.   Right Ear: Tympanic membrane, external ear and ear canal normal.   Left Ear: Tympanic membrane, external ear and ear canal normal.   Nose: Nose normal.   Mouth/Throat: Uvula is midline, oropharynx is clear and moist and mucous membranes are normal. Normal dentition. No uvula swelling.   Eyes: Conjunctivae are normal. Pupils are equal, round, and reactive to light.   Neck: Normal range of motion. Neck supple. No thyromegaly present.   Cardiovascular: Normal rate, regular rhythm, normal heart sounds and intact distal pulses.  Exam reveals no gallop and no friction rub.    No murmur heard.  Pulmonary/Chest: Effort normal and breath sounds normal. No respiratory distress. She has no wheezes. She has no rales.   Lymphadenopathy:     She has no cervical adenopathy.   Neurological: She is alert and oriented to person, place, and time. She displays normal reflexes. No cranial nerve deficit.   Skin: Skin is warm and dry. Capillary refill takes less than 2 seconds.   Psychiatric: She has a normal mood and affect. Her behavior is normal. Judgment and thought content normal. "       Assessment/Plan   Problem List Items Addressed This Visit     RESOLVED: Benign paroxysmal positional vertigo of left ear    Stroke (CMS/HCC) (HCC)     - Incidental finding on MRI 2016: chronic, small cortical cerebellar infarct  - Pt had been seeing Neurology, initially recommended sleep study. Per Dr. Estrada, sleep deprivation may be contributing to dizziness/vertigo. Pt did not have done, she was skeptical and feeling well until most recent episode/TIA  - She will continue seeing Neurology, scheduled to see Dr. Hidalgo on Friday 11/9. Will keep pt on high dose statin and baby aspirin. BP well controlled today. Will repeat LP 3 mos?  - I am recommending Hematology for hypercoag work up and Cardiology to evaluate for PAF or PFO. Spoke with Dr. Dumont, likely needs MCOT monitor and JOSUÉ.            Other Visit Diagnoses     Transition of care performed with sharing of clinical summary    -  Primary    - Hospital records reviewed  - Med reconiliation performed  - Pt has appropriate follow up appointment w/ Neuro  - Will refer to Hematology and Cards for w/u    TIA (transient ischemic attack)        - MRI no acute infarct. S/sx resolved within 24 hours. No deficits  - Scheduled to see Neurology Friday 11/9  - Will need Hematology and Cardiac work up        -

## 2018-11-08 PROBLEM — I63.9 STROKE (CMS/HCC): Status: ACTIVE | Noted: 2018-11-08

## 2018-11-08 ASSESSMENT — ENCOUNTER SYMPTOMS
SEIZURES: 0
AGITATION: 0
TREMORS: 0
FEVER: 0
PALPITATIONS: 0
COUGH: 0
NUMBNESS: 0
HEADACHES: 0
DIAPHORESIS: 0
LIGHT-HEADEDNESS: 0
ACTIVITY CHANGE: 0
SPEECH DIFFICULTY: 0
WEAKNESS: 0
FATIGUE: 0
ADENOPATHY: 0
SHORTNESS OF BREATH: 0
CONFUSION: 0
DECREASED CONCENTRATION: 0
DIZZINESS: 1
APPETITE CHANGE: 0
CHILLS: 0
FACIAL ASYMMETRY: 0

## 2018-11-08 NOTE — ASSESSMENT & PLAN NOTE
- Incidental finding on MRI 2016: chronic, small cortical cerebellar infarct  - Pt had been seeing Neurology, initially recommended sleep study. Per Dr. Estrada, sleep deprivation may be contributing to dizziness/vertigo. Pt did not have done, she was skeptical and feeling well until most recent episode/TIA  - She will continue seeing Neurology, scheduled to see Dr. Hidalgo on Friday 11/9. Will keep pt on high dose statin and baby aspirin. BP well controlled today. Will repeat LP 3 mos?  - I am recommending Hematology for hypercoag work up and Cardiology to evaluate for PAF or PFO. Spoke with Dr. Dumont, likely needs MCOT monitor and JOSUÉ.

## 2018-11-15 ENCOUNTER — OFFICE VISIT (OUTPATIENT)
Dept: CARDIOLOGY | Facility: CLINIC | Age: 48
End: 2018-11-15
Payer: COMMERCIAL

## 2018-11-15 ENCOUNTER — TELEPHONE (OUTPATIENT)
Dept: CARDIOLOGY | Facility: CLINIC | Age: 48
End: 2018-11-15

## 2018-11-15 VITALS
DIASTOLIC BLOOD PRESSURE: 82 MMHG | HEIGHT: 63 IN | WEIGHT: 169 LBS | SYSTOLIC BLOOD PRESSURE: 138 MMHG | BODY MASS INDEX: 29.95 KG/M2

## 2018-11-15 DIAGNOSIS — I63.9 CEREBROVASCULAR ACCIDENT (CVA), UNSPECIFIED MECHANISM (CMS/HCC): ICD-10-CM

## 2018-11-15 DIAGNOSIS — E78.00 HIGH CHOLESTEROL: Primary | ICD-10-CM

## 2018-11-15 PROBLEM — E78.2 MIXED HYPERLIPIDEMIA: Status: ACTIVE | Noted: 2018-11-15

## 2018-11-15 PROCEDURE — 99244 OFF/OP CNSLTJ NEW/EST MOD 40: CPT | Performed by: INTERNAL MEDICINE

## 2018-11-15 RX ORDER — LANOLIN ALCOHOL/MO/W.PET/CERES
1000 CREAM (GRAM) TOPICAL DAILY
COMMUNITY
End: 2019-09-27

## 2018-11-15 ASSESSMENT — ENCOUNTER SYMPTOMS
WHEEZING: 0
SHORTNESS OF BREATH: 0
INSOMNIA: 0
HEADACHES: 0
MYALGIAS: 0
COUGH: 0
PALPITATIONS: 0
BRUISES/BLEEDS EASILY: 0
NERVOUS/ANXIOUS: 0
PND: 0
CONSTIPATION: 0
MUSCLE CRAMPS: 0
ABDOMINAL PAIN: 0
DIZZINESS: 0
ANOREXIA: 0
WEIGHT LOSS: 0
WEIGHT GAIN: 0
DIARRHEA: 0
SNORING: 0
NUMBNESS: 0
LIGHT-HEADEDNESS: 0
VERTIGO: 1
HEMOPTYSIS: 0
HOARSE VOICE: 0
SYNCOPE: 0
DYSPNEA ON EXERTION: 0
HEMATOLOGIC/LYMPHATIC NEGATIVE: 1
TREMORS: 0
IRREGULAR HEARTBEAT: 0
SPUTUM PRODUCTION: 0
NEAR-SYNCOPE: 0
JAUNDICE: 0
ORTHOPNEA: 0
FALLS: 0
NAUSEA: 0
MEMORY LOSS: 0
CLAUDICATION: 0
CHANGE IN BOWEL HABIT: 0
FOCAL WEAKNESS: 0
SLEEP DISTURBANCES DUE TO BREATHING: 0
FREQUENCY: 0
HEARTBURN: 0

## 2018-11-15 NOTE — PROGRESS NOTES
Cardiology Consult/New Patient    Bethany Celestin CRNP          Orly Lara is a 48 y.o. female identifies as who presents with   She is here for cardiovascular evaluation after an MRI revealed evidence of an old infarction, right posterior cerebellar  Records reviewed  It was first seen in 2016 on an MRI  She has chronic issues with vertigo  She repeat MRI October 2018 because of persistent vertigo he actually believe the vertigo is unrelated to the stroke and she is being followed by ENT  She is treated for hyperlipidemia   her Lipitor was recently increased to 40 mg  And has been on aspirin therapy since an MRI was performed  Cardiovascular testing included a coronary calcium score of 0 in 2015   And stress echocardiogram at that time was negative  she told me she has had duplex of her carotids in the past with neurology that is been negative  She has  strong family history of vascular disease with her mom having stroke and myocardial infarction            Patient Active Problem List    Diagnosis Date Noted   • Mixed hyperlipidemia 11/15/2018   • Stroke (CMS/HCC) (HCC) 11/08/2018   • Vertigo 10/30/2018   • Pre-procedure lab exam 08/05/2016   • Vestibular disequilibrium, left 08/05/2016   • Anemia 11/11/2014       Medical History:   Past Medical History:   Diagnosis Date   • Hypertension    • Positional vertigo    • Stroke (CMS/HCC) (HCC)        Surgical History:   Past Surgical History:   Procedure Laterality Date   • HYSTEROSCOPY W/ ENDOMETRIAL ABLATION         Allergies: Patient has no known allergies.    Current Outpatient Prescriptions   Medication Sig Dispense Refill   • acyclovir (ZOVIRAX) 400 mg tablet Take 400 mg by mouth 3 (three) times a day as needed (for 7 days for cold sore outbreak).       • aspirin 81 mg enteric coated tablet Take 1 tablet (81 mg total) by mouth daily for 89 doses. 89 tablet 0   • atorvastatin (LIPITOR) 40 mg tablet Take 1 tablet (40 mg total) by mouth nightly. 30  tablet 0   • cyanocobalamin (VITAMIN B12) 1,000 mcg tablet Take 1,000 mcg by mouth daily.     • Lactobacillus acidophilus (PROBIOTIC ORAL) Take 1 capsule by mouth daily.       No current facility-administered medications for this visit.        Social History:   Social History     Social History   • Marital status:      Spouse name: N/A   • Number of children: N/A   • Years of education: N/A     Social History Main Topics   • Smoking status: Former Smoker   • Smokeless tobacco: Never Used   • Alcohol use Yes   • Drug use: Unknown   • Sexual activity: Not on file     Other Topics Concern   • Not on file     Social History Narrative    Do you wear your seatbelt? Yes    Do you have smoke detector in your home? Yes    Do you have a carbon monoxide detector in your home? 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    Current Marital Status?            Family History:   Family History   Problem Relation Age of Onset   • Heart attack Mother    • Hyperlipidemia Mother        Review of Systems   Review of Systems   Constitution: Negative for malaise/fatigue, weight gain and weight loss.   HENT: Negative for hearing loss and hoarse voice.    Eyes: Negative for visual disturbance.   Cardiovascular: Negative for chest pain, claudication, cyanosis, dyspnea on exertion, irregular heartbeat, leg swelling, near-syncope, orthopnea, palpitations, paroxysmal nocturnal dyspnea and syncope.   Respiratory: Negative for cough, hemoptysis, shortness of breath, sleep disturbances due to breathing, snoring, sputum production and wheezing.    Endocrine: Negative for cold intolerance and heat intolerance.   Hematologic/Lymphatic: Negative.  Negative for bleeding problem. Does not bruise/bleed easily.   Skin: Negative.  Negative for rash.   Musculoskeletal: Negative for arthritis, falls, joint pain, muscle cramps and myalgias.   Gastrointestinal: Negative for abdominal pain, anorexia, change in bowel habit, constipation,  diarrhea, dysphagia, heartburn, jaundice and nausea.   Genitourinary: Negative for frequency and nocturia.   Neurological: Positive for vertigo. Negative for dizziness, focal weakness, headaches, light-headedness, numbness and tremors.   Psychiatric/Behavioral: Negative for memory loss. The patient does not have insomnia and is not nervous/anxious.    Allergic/Immunologic: Negative for hives.       Objective       Vitals:    11/15/18 1430   BP: 138/82       Physical Exam   Constitutional: She is oriented to person, place, and time. She appears well-developed and well-nourished. No distress.   HENT:   Head: Normocephalic and atraumatic.   Nose: Nose normal.   Eyes: Conjunctivae are normal. No scleral icterus.   Neck: No JVD present.   Cardiovascular: Normal rate, regular rhythm, normal heart sounds and intact distal pulses.  Exam reveals no gallop and no friction rub.    No murmur heard.  Pulmonary/Chest: Effort normal. No stridor. No respiratory distress. She has no wheezes. She has no rales. She exhibits no tenderness.   Abdominal: There is no tenderness.   Musculoskeletal: She exhibits no edema or deformity.   Neurological: She is alert and oriented to person, place, and time.   Skin: Skin is warm and dry.   Psychiatric: She has a normal mood and affect.        Labs   Lab Results   Component Value Date    WBC 5.78 10/30/2018    HGB 13.0 10/30/2018    HCT 39.9 10/30/2018     10/30/2018    CHOL 178 10/31/2018    TRIG 123 10/31/2018    HDL 51 (L) 10/31/2018    ALT 21 10/12/2017    AST 25 10/12/2017     10/30/2018    K 3.9 10/30/2018     10/30/2018    CREATININE 0.7 10/30/2018    BUN 10 10/30/2018    CO2 25 10/30/2018    TSH 1.450 01/18/2016    INR 0.9 10/30/2018    HGBA1C 5.3 10/12/2017       Imaging    CATH  PFO REPAIR 2/19 AMPLATZER DEVICE DR MARROQUIN    CAT SCAN  Cor garett score zero 2015 thymus tissue      MRI   mri head 10/18 r cerebellar old infarc    ELECTROPHYSIOLOGY  12/18 monitor 10 beat  svt    ECG   sinus rhythm      Assessment/Plan     Stroke (CMS/HCC) (HCC)  She has a chronic problem with vertigo multiple evaluations from ENT and neurology feel it is peripheral in nature  Workup showed an old right cerebellar stroke that was seen on MRI in 2016 and again in October 2018  My concern is I want to exclude rare etiologies for stroke in this 48-year-old female  We will plan long-term monitor to exclude occult atrial fibrillation  I do transesophageal echo to exclude patent foramen ovale  The reason I need to do this because treatment would be different than just antiplatelet and statin therapy if either of these diagnoses are present for  She is going to have a coagulation workup with Dr. GILA Barrera    Mixed hyperlipidemia  Her LDL cholesterol was 100 atorvastatin recently increased to 40 repeat lab work in 6 weeks the goal being LDL cholesterol 160 in a patient with history of stroke         I will see her back after we exclude cardiac etiologies of stroke in this 48-year-old female including patent foramen ovale and occult atrial fibrillation  She will have month-long continuous cardiac monitoring  And transesophageal echocardiogram to exclude patent foramen ovale which if present would be indication for transarterial repair  Also CT angiogram of cerebral arteries  sHe will also be having coagulation workup with Dr. Barrera        This letter was generated using speech recognition software.  Please excuse any typographical errors.    Luis Dumont MD  11/15/2018

## 2018-11-15 NOTE — LETTER
November 15, 2018     DEIRDRE Ludwig  [address] [address]    Patient: Orly Lara   YOB: 1970   Date of Visit: 11/15/2018       Dear Dr. Celestin:    Thank you for referring Orly Lara to me for evaluation. Below are my notes for this consultation.    If you have questions, please do not hesitate to call me. I look forward to following your patient along with you.         Sincerely,        Luis Dumont MD        CC: MACIE Shearer MD Becker, Luis MORAN MD  11/15/2018  3:11 PM  Sign at close encounter  Cardiology Consult/New Patient    Bethany Celestin CRNP          Orly Lara is a 48 y.o. female identifies as who presents with   She is here for cardiovascular evaluation after an MRI revealed evidence of an old infarction, right posterior cerebellar  Records reviewed  It was first seen in 2016 on an MRI  She has chronic issues with vertigo  She repeat MRI October 2018 because of persistent vertigo he actually believe the vertigo is unrelated to the stroke and she is being followed by ENT  She is treated for hyperlipidemia   her Lipitor was recently increased to 40 mg  And has been on aspirin therapy since an MRI was performed  Cardiovascular testing included a coronary calcium score of 0 in 2015   And stress echocardiogram at that time was negative  she told me she has had duplex of her carotids in the past with neurology that is been negative  She has  strong family history of vascular disease with her mom having stroke and myocardial infarction            Patient Active Problem List    Diagnosis Date Noted   • Mixed hyperlipidemia 11/15/2018   • Stroke (CMS/HCC) ([facility]) 11/08/2018   • Vertigo 10/30/2018   • Pre-procedure lab exam 08/05/2016   • Vestibular disequilibrium, left 08/05/2016   • Anemia 11/11/2014       Medical History:   Past Medical History:   Diagnosis Date   • Hypertension    • Positional vertigo     • Stroke (CMS/HCC) (HCC)        Surgical History:   Past Surgical History:   Procedure Laterality Date   • HYSTEROSCOPY W/ ENDOMETRIAL ABLATION         Allergies: Patient has no known allergies.    Current Outpatient Prescriptions   Medication Sig Dispense Refill   • acyclovir (ZOVIRAX) 400 mg tablet Take 400 mg by mouth 3 (three) times a day as needed (for 7 days for cold sore outbreak).       • aspirin 81 mg enteric coated tablet Take 1 tablet (81 mg total) by mouth daily for 89 doses. 89 tablet 0   • atorvastatin (LIPITOR) 40 mg tablet Take 1 tablet (40 mg total) by mouth nightly. 30 tablet 0   • cyanocobalamin (VITAMIN B12) 1,000 mcg tablet Take 1,000 mcg by mouth daily.     • Lactobacillus acidophilus (PROBIOTIC ORAL) Take 1 capsule by mouth daily.       No current facility-administered medications for this visit.        Social History:   Social History     Social History   • Marital status:      Spouse name: N/A   • Number of children: N/A   • Years of education: N/A     Social History Main Topics   • Smoking status: Former Smoker   • Smokeless tobacco: Never Used   • Alcohol use Yes   • Drug use: Unknown   • Sexual activity: Not on file     Other Topics Concern   • Not on file     Social History Narrative    Do you wear your seatbelt? Yes    Do you have smoke detector in your home? Yes    Do you have a carbon monoxide detector in your home? 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    Current Marital Status?            Family History:   Family History   Problem Relation Age of Onset   • Heart attack Mother    • Hyperlipidemia Mother        Review of Systems   Review of Systems   Constitution: Negative for malaise/fatigue, weight gain and weight loss.   HENT: Negative for hearing loss and hoarse voice.    Eyes: Negative for visual disturbance.   Cardiovascular: Negative for chest pain, claudication, cyanosis, dyspnea on exertion, irregular heartbeat, leg swelling, near-syncope,  orthopnea, palpitations, paroxysmal nocturnal dyspnea and syncope.   Respiratory: Negative for cough, hemoptysis, shortness of breath, sleep disturbances due to breathing, snoring, sputum production and wheezing.    Endocrine: Negative for cold intolerance and heat intolerance.   Hematologic/Lymphatic: Negative.  Negative for bleeding problem. Does not bruise/bleed easily.   Skin: Negative.  Negative for rash.   Musculoskeletal: Negative for arthritis, falls, joint pain, muscle cramps and myalgias.   Gastrointestinal: Negative for abdominal pain, anorexia, change in bowel habit, constipation, diarrhea, dysphagia, heartburn, jaundice and nausea.   Genitourinary: Negative for frequency and nocturia.   Neurological: Positive for vertigo. Negative for dizziness, focal weakness, headaches, light-headedness, numbness and tremors.   Psychiatric/Behavioral: Negative for memory loss. The patient does not have insomnia and is not nervous/anxious.    Allergic/Immunologic: Negative for hives.       Objective       Vitals:    11/15/18 1430   BP: 138/82       Physical Exam   Constitutional: She is oriented to person, place, and time. She appears well-developed and well-nourished. No distress.   HENT:   Head: Normocephalic and atraumatic.   Nose: Nose normal.   Eyes: Conjunctivae are normal. No scleral icterus.   Neck: No JVD present.   Cardiovascular: Normal rate, regular rhythm, normal heart sounds and intact distal pulses.  Exam reveals no gallop and no friction rub.    No murmur heard.  Pulmonary/Chest: Effort normal. No stridor. No respiratory distress. She has no wheezes. She has no rales. She exhibits no tenderness.   Abdominal: There is no tenderness.   Musculoskeletal: She exhibits no edema or deformity.   Neurological: She is alert and oriented to person, place, and time.   Skin: Skin is warm and dry.   Psychiatric: She has a normal mood and affect.        Labs   Lab Results   Component Value Date    WBC 5.78 10/30/2018     HGB 13.0 10/30/2018    HCT 39.9 10/30/2018     10/30/2018    CHOL 178 10/31/2018    TRIG 123 10/31/2018    HDL 51 (L) 10/31/2018    ALT 21 10/12/2017    AST 25 10/12/2017     10/30/2018    K 3.9 10/30/2018     10/30/2018    CREATININE 0.7 10/30/2018    BUN 10 10/30/2018    CO2 25 10/30/2018    TSH 1.450 01/18/2016    INR 0.9 10/30/2018    HGBA1C 5.3 10/12/2017       Imaging        CAT SCAN  Cor garett score zero 2015 thymus tissue      MRI   mri head 10/18 r cerebellar old infarc      ECG   sinus rhythm      Assessment/Plan     Stroke (CMS/HCC) (HCC)  She has a chronic problem with vertigo multiple evaluations from ENT and neurology feel it is peripheral in nature  Workup showed an old right cerebellar stroke that was seen on MRI in 2016 and again in October 2018  My concern is I want to exclude rare etiologies for stroke in this 48-year-old female  We will plan long-term monitor to exclude occult atrial fibrillation  I do transesophageal echo to exclude patent foramen ovale  The reason I need to do this because treatment would be different than just antiplatelet and statin therapy if either of these diagnoses are present for  She is going to have a coagulation workup with Dr. GILA Barrera    Mixed hyperlipidemia  Her LDL cholesterol was 100 atorvastatin recently increased to 40 repeat lab work in 6 weeks the goal being LDL cholesterol 160 in a patient with history of stroke         I will see her back after we exclude cardiac etiologies of stroke in this 48-year-old female including patent foramen ovale and occult atrial fibrillation  She will have month-long continuous cardiac monitoring  And transesophageal echocardiogram to exclude patent foramen ovale which if present would be indication for transarterial repair  Also CT angiogram of cerebral arteries  sHe will also be having coagulation workup with Dr. Barrera        This letter was generated using speech recognition software.  Please excuse any  typographical errors.    Luis Dumont MD  11/15/2018

## 2018-11-15 NOTE — ASSESSMENT & PLAN NOTE
She has a chronic problem with vertigo multiple evaluations from ENT and neurology feel it is peripheral in nature  Workup showed an old right cerebellar stroke that was seen on MRI in 2016 and again in October 2018  My concern is I want to exclude rare etiologies for stroke in this 48-year-old female  We will plan long-term monitor to exclude occult atrial fibrillation  I do transesophageal echo to exclude patent foramen ovale  The reason I need to do this because treatment would be different than just antiplatelet and statin therapy if either of these diagnoses are present for  She is going to have a coagulation workup with Dr. GILA Barrera

## 2018-11-15 NOTE — ASSESSMENT & PLAN NOTE
Her LDL cholesterol was 100 atorvastatin recently increased to 40 repeat lab work in 6 weeks the goal being LDL cholesterol 160 in a patient with history of stroke

## 2018-11-15 NOTE — TELEPHONE ENCOUNTER
Please precert for a JOSUÉ transesophageal echo.  Testing date:  11-, [facility].    Please precert for a CT Angiogram head,with and without contrast.  Pt is looking to do this within 4 weeks.

## 2018-11-15 NOTE — PATIENT INSTRUCTIONS
Monday afternoon go to [place] for monitor  Schedule JOSUÉ echo at [facility]  CTA of head  Lab work after you are on lipitor 40 for 6 weeks  See benito 2 months

## 2018-11-16 NOTE — TELEPHONE ENCOUNTER
Cammie   rec'd a call from Hannah from [employer] who wanted to inform that pt does not need a precert for cta.  Per #[id number]

## 2018-11-16 NOTE — TELEPHONE ENCOUNTER
Pt called me back- She states she spoke to Lewis a little while before I had called her. She will call to CTA of Head

## 2018-11-16 NOTE — TELEPHONE ENCOUNTER
Gavin no pcr per [employer] complete and documented  cta head does need a pre cert clinicals faxed to them this process can take up to 15 days. Will update you . ty

## 2018-11-16 NOTE — TELEPHONE ENCOUNTER
Please see task by access center yoko from [employer] called to inform us that pt does not need a pre cert for cta ref #[id number] pt can be called to help [email or web address]

## 2018-11-28 ENCOUNTER — TELEPHONE (OUTPATIENT)
Dept: CARDIOLOGY | Facility: HOSPITAL | Age: 48
End: 2018-11-28

## 2018-11-28 NOTE — TELEPHONE ENCOUNTER
Spoke with patient to remind her and give instructions for JOSUÉ on Friday at 10AM.  Questions answered, and patient verbalized understanding.

## 2018-11-30 ENCOUNTER — TELEPHONE (OUTPATIENT)
Dept: CARDIOLOGY | Facility: CLINIC | Age: 48
End: 2018-11-30

## 2018-11-30 ENCOUNTER — HOSPITAL ENCOUNTER (OUTPATIENT)
Dept: CARDIOLOGY | Facility: HOSPITAL | Age: 48
Discharge: HOME | End: 2018-11-30
Attending: INTERNAL MEDICINE
Payer: COMMERCIAL

## 2018-11-30 ENCOUNTER — TELEPHONE (OUTPATIENT)
Dept: CARDIOLOGY | Facility: HOSPITAL | Age: 48
End: 2018-11-30

## 2018-11-30 VITALS
BODY MASS INDEX: 29.23 KG/M2 | RESPIRATION RATE: 16 BRPM | SYSTOLIC BLOOD PRESSURE: 142 MMHG | WEIGHT: 165 LBS | HEART RATE: 81 BPM | DIASTOLIC BLOOD PRESSURE: 75 MMHG | HEIGHT: 63 IN | OXYGEN SATURATION: 95 %

## 2018-11-30 DIAGNOSIS — I63.9 CEREBROVASCULAR ACCIDENT (CVA), UNSPECIFIED MECHANISM (CMS/HCC): ICD-10-CM

## 2018-11-30 PROBLEM — Q21.12 PFO (PATENT FORAMEN OVALE): Status: ACTIVE | Noted: 2018-11-30

## 2018-11-30 LAB — BSA FOR ECHO PROCEDURE: 1.82 M2

## 2018-11-30 PROCEDURE — 93312 ECHO TRANSESOPHAGEAL: CPT

## 2018-11-30 PROCEDURE — 25000000 HC PHARMACY GENERAL: Performed by: INTERNAL MEDICINE

## 2018-11-30 PROCEDURE — B246ZZ4 ULTRASONOGRAPHY OF RIGHT AND LEFT HEART, TRANSESOPHAGEAL: ICD-10-PCS | Performed by: INTERNAL MEDICINE

## 2018-11-30 PROCEDURE — 93312 ECHO TRANSESOPHAGEAL: CPT | Mod: 26 | Performed by: INTERNAL MEDICINE

## 2018-11-30 PROCEDURE — 63700000 HC SELF-ADMINISTRABLE DRUG: Performed by: INTERNAL MEDICINE

## 2018-11-30 PROCEDURE — 63600000 HC DRUGS/DETAIL CODE: Performed by: INTERNAL MEDICINE

## 2018-11-30 RX ORDER — FENTANYL CITRATE 50 UG/ML
INJECTION, SOLUTION INTRAMUSCULAR; INTRAVENOUS
Status: COMPLETED | OUTPATIENT
Start: 2018-11-30 | End: 2018-11-30

## 2018-11-30 RX ORDER — LIDOCAINE 50 MG/G
OINTMENT TOPICAL
Status: COMPLETED | OUTPATIENT
Start: 2018-11-30 | End: 2018-11-30

## 2018-11-30 RX ORDER — MIDAZOLAM HYDROCHLORIDE 2 MG/2ML
INJECTION, SOLUTION INTRAMUSCULAR; INTRAVENOUS
Status: COMPLETED | OUTPATIENT
Start: 2018-11-30 | End: 2018-11-30

## 2018-11-30 RX ADMIN — MIDAZOLAM HYDROCHLORIDE 1 MG: 1 INJECTION, SOLUTION INTRAMUSCULAR; INTRAVENOUS at 11:00

## 2018-11-30 RX ADMIN — LIDOCAINE 50 MG: 50 OINTMENT TOPICAL at 10:39

## 2018-11-30 RX ADMIN — MIDAZOLAM HYDROCHLORIDE 2 MG: 1 INJECTION, SOLUTION INTRAMUSCULAR; INTRAVENOUS at 10:56

## 2018-11-30 RX ADMIN — FENTANYL CITRATE 25 MCG: 50 INJECTION, SOLUTION INTRAMUSCULAR; INTRAVENOUS at 11:03

## 2018-11-30 RX ADMIN — FENTANYL CITRATE 25 MCG: 50 INJECTION, SOLUTION INTRAMUSCULAR; INTRAVENOUS at 10:56

## 2018-11-30 RX ADMIN — MIDAZOLAM HYDROCHLORIDE 1 MG: 1 INJECTION, SOLUTION INTRAMUSCULAR; INTRAVENOUS at 11:14

## 2018-11-30 RX ADMIN — MIDAZOLAM HYDROCHLORIDE 2 MG: 1 INJECTION, SOLUTION INTRAMUSCULAR; INTRAVENOUS at 10:53

## 2018-11-30 RX ADMIN — FENTANYL CITRATE 25 MCG: 50 INJECTION, SOLUTION INTRAMUSCULAR; INTRAVENOUS at 11:17

## 2018-11-30 RX ADMIN — BENZOCAINE 2 SPRAY: 11.4 AEROSOL, SPRAY TOPICAL at 10:39

## 2018-11-30 RX ADMIN — MIDAZOLAM HYDROCHLORIDE 1 MG: 1 INJECTION, SOLUTION INTRAMUSCULAR; INTRAVENOUS at 11:12

## 2018-11-30 RX ADMIN — MIDAZOLAM HYDROCHLORIDE 1 MG: 1 INJECTION, SOLUTION INTRAMUSCULAR; INTRAVENOUS at 11:17

## 2018-11-30 RX ADMIN — MIDAZOLAM HYDROCHLORIDE 1 MG: 1 INJECTION, SOLUTION INTRAMUSCULAR; INTRAVENOUS at 11:20

## 2018-11-30 RX ADMIN — FENTANYL CITRATE 25 MCG: 50 INJECTION, SOLUTION INTRAMUSCULAR; INTRAVENOUS at 10:53

## 2018-11-30 RX ADMIN — MIDAZOLAM HYDROCHLORIDE 1 MG: 1 INJECTION, SOLUTION INTRAMUSCULAR; INTRAVENOUS at 11:09

## 2018-11-30 NOTE — Clinical Note
Patient position: left lateral. Bite block inserted. JOSUÉ probe inserted via blind insertion technique. Probe inserted without difficulty. JOSUÉ in progress. Complications: no complications.

## 2018-11-30 NOTE — TELEPHONE ENCOUNTER
I spoke to her  She will pursue her hematologic evaluation  And then I will set her up to see Dr. Saldana for consideration of PFO repair

## 2018-11-30 NOTE — Clinical Note
Patient position: left lateral. Bite block removed. JOSUÉ probe removed. . Complications: no complications.

## 2018-11-30 NOTE — NURSING NOTE
Dr. Cruz discussed JOSUÉ finding with patient and .  DC instructions reviewed with patient and .  Questions answered.  Given name of  PFO.  Ambulated to waiting room.  Pt slightly unsteady on feet.    Instructed to eat/drink with special atteniton to swallowing.

## 2018-11-30 NOTE — PRE-SEDATION DOCUMENTATION
Sedation Plan    ASA 1     Mallampati class: I.    Risks, benefits, and alternatives discussed with patient.

## 2018-12-07 ENCOUNTER — HOSPITAL ENCOUNTER (OUTPATIENT)
Dept: RADIOLOGY | Facility: HOSPITAL | Age: 48
Discharge: HOME | End: 2018-12-07
Attending: INTERNAL MEDICINE
Payer: COMMERCIAL

## 2018-12-07 DIAGNOSIS — I63.9 CEREBROVASCULAR ACCIDENT (CVA), UNSPECIFIED MECHANISM (CMS/HCC): ICD-10-CM

## 2018-12-07 PROCEDURE — 70496 CT ANGIOGRAPHY HEAD: CPT

## 2018-12-07 PROCEDURE — 63600105 HC IODINE BASED CONTRAST: Performed by: INTERNAL MEDICINE

## 2018-12-07 RX ADMIN — IOHEXOL 120 ML: 300 INJECTION, SOLUTION INTRAVENOUS at 15:33

## 2018-12-10 ENCOUNTER — TELEPHONE (OUTPATIENT)
Dept: SCHEDULING | Facility: CLINIC | Age: 48
End: 2018-12-10

## 2018-12-10 ENCOUNTER — TELEPHONE (OUTPATIENT)
Dept: PRIMARY CARE | Facility: CLINIC | Age: 48
End: 2018-12-10

## 2018-12-10 ENCOUNTER — TELEPHONE (OUTPATIENT)
Dept: CARDIOLOGY | Facility: CLINIC | Age: 48
End: 2018-12-10

## 2018-12-10 NOTE — TELEPHONE ENCOUNTER
I spoke to her about the findings of CT angiogram of her brain  Small intracranial aneurysm seen 3 mm  She is having no symptoms no headaches  I asked her to follow-up with neurology

## 2018-12-10 NOTE — TELEPHONE ENCOUNTER
Pt called yesterday c/o past day w symptoms after getting CT w contrast so worried having a reaction. Started mild rash then itching and eyelids a little puffy and now rash and itching getting worse. No SOB or throat symptoms. Started Benadryl and called in Medrol dose pack. Advised ER if any worsening symptoms. I added it to her allergy list for the future.

## 2018-12-14 PROBLEM — I47.10 SVT (SUPRAVENTRICULAR TACHYCARDIA) (CMS/HCC): Status: ACTIVE | Noted: 2018-12-14

## 2019-01-10 RX ORDER — ATORVASTATIN CALCIUM 40 MG/1
40 TABLET, FILM COATED ORAL NIGHTLY
Qty: 90 TABLET | Refills: 1 | Status: SHIPPED | OUTPATIENT
Start: 2019-01-10 | End: 2019-07-29

## 2019-01-29 ENCOUNTER — OFFICE VISIT (OUTPATIENT)
Dept: CARDIOLOGY | Facility: CLINIC | Age: 49
End: 2019-01-29
Payer: COMMERCIAL

## 2019-01-29 VITALS — WEIGHT: 172 LBS | BODY MASS INDEX: 30.48 KG/M2 | RESPIRATION RATE: 16 BRPM | HEIGHT: 63 IN | HEART RATE: 78 BPM

## 2019-01-29 DIAGNOSIS — I63.441 CEREBROVASCULAR ACCIDENT (CVA) DUE TO EMBOLISM OF RIGHT CEREBELLAR ARTERY (CMS/HCC): Primary | ICD-10-CM

## 2019-01-29 DIAGNOSIS — Q21.12 PFO (PATENT FORAMEN OVALE): ICD-10-CM

## 2019-01-29 PROCEDURE — 99204 OFFICE O/P NEW MOD 45 MIN: CPT | Performed by: INTERNAL MEDICINE

## 2019-01-30 ENCOUNTER — TELEPHONE (OUTPATIENT)
Dept: CARDIOTHORACIC SURGERY | Facility: CLINIC | Age: 49
End: 2019-01-30

## 2019-01-31 ENCOUNTER — TELEPHONE (OUTPATIENT)
Dept: CARDIOLOGY | Facility: HOSPITAL | Age: 49
End: 2019-01-31

## 2019-01-31 DIAGNOSIS — Z01.812 PRE-PROCEDURE LAB EXAM: ICD-10-CM

## 2019-01-31 DIAGNOSIS — Q21.12 PFO (PATENT FORAMEN OVALE): Primary | ICD-10-CM

## 2019-01-31 NOTE — TELEPHONE ENCOUNTER
Please enter a requisition for PFO closure with Dr Saldana  On 02/14/19 labs CBC and diff, CMP, EKG, PT/INR, Chest x ray, type and screen, for 2 units PRBC

## 2019-02-07 ENCOUNTER — HOSPITAL ENCOUNTER (OUTPATIENT)
Dept: CARDIOLOGY | Facility: HOSPITAL | Age: 49
Discharge: HOME | End: 2019-02-07
Attending: INTERNAL MEDICINE
Payer: COMMERCIAL

## 2019-02-07 ENCOUNTER — HOSPITAL ENCOUNTER (OUTPATIENT)
Dept: RADIOLOGY | Facility: HOSPITAL | Age: 49
Discharge: HOME | End: 2019-02-07
Attending: INTERNAL MEDICINE
Payer: COMMERCIAL

## 2019-02-07 ENCOUNTER — APPOINTMENT (OUTPATIENT)
Dept: LAB | Facility: HOSPITAL | Age: 49
End: 2019-02-07
Attending: INTERNAL MEDICINE
Payer: COMMERCIAL

## 2019-02-07 DIAGNOSIS — Q21.12 PFO (PATENT FORAMEN OVALE): ICD-10-CM

## 2019-02-07 DIAGNOSIS — Z01.812 PRE-PROCEDURE LAB EXAM: ICD-10-CM

## 2019-02-07 LAB
ABO + RH BLD: NORMAL
ALBUMIN SERPL-MCNC: 3.8 G/DL (ref 3.4–5)
ALP SERPL-CCNC: 60 IU/L (ref 35–126)
ALT SERPL-CCNC: 24 IU/L (ref 11–54)
ANION GAP SERPL CALC-SCNC: 9 MEQ/L (ref 3–15)
AST SERPL-CCNC: 27 IU/L (ref 15–41)
ATRIAL RATE: 57
BASOPHILS # BLD: 0.04 K/UL (ref 0.01–0.1)
BASOPHILS NFR BLD: 0.7 %
BILIRUB SERPL-MCNC: 0.7 MG/DL (ref 0.3–1.2)
BILIRUB UR QL STRIP.AUTO: NEGATIVE MG/DL
BLD GP AB SCN SERPL QL: NEGATIVE
BLOOD BANK CMNT PATIENT-IMP: NORMAL
BUN SERPL-MCNC: 8 MG/DL (ref 8–20)
BURR CELLS BLD QL SMEAR: NORMAL
CALCIUM SERPL-MCNC: 9.3 MG/DL (ref 8.9–10.3)
CHLORIDE SERPL-SCNC: 105 MEQ/L (ref 98–109)
CLARITY UR REFRACT.AUTO: CLEAR
CO2 SERPL-SCNC: 23 MEQ/L (ref 22–32)
COLOR UR AUTO: YELLOW
CREAT SERPL-MCNC: 0.7 MG/DL
D AG BLD QL: POSITIVE
DIFFERENTIAL METHOD BLD: NORMAL
EOSINOPHIL # BLD: 0.07 K/UL (ref 0.04–0.36)
EOSINOPHIL NFR BLD: 1.2 %
ERYTHROCYTE [DISTWIDTH] IN BLOOD BY AUTOMATED COUNT: 13.8 % (ref 11.7–14.4)
GFR SERPL CREATININE-BSD FRML MDRD: >60 ML/MIN/1.73M*2
GLUCOSE SERPL-MCNC: 87 MG/DL (ref 70–99)
GLUCOSE UR STRIP.AUTO-MCNC: NEGATIVE MG/DL
HCT VFR BLDCO AUTO: 38 %
HGB BLD-MCNC: 12.3 G/DL
HGB UR QL STRIP.AUTO: NEGATIVE
IMM GRANULOCYTES # BLD AUTO: 0.05 K/UL (ref 0–0.08)
IMM GRANULOCYTES NFR BLD AUTO: 0.9 %
INR PPP: 0.9 INR
KETONES UR STRIP.AUTO-MCNC: NEGATIVE MG/DL
LABORATORY COMMENT REPORT: NORMAL
LEUKOCYTE ESTERASE UR QL STRIP.AUTO: NEGATIVE
LYMPHOCYTES # BLD: 1.61 K/UL (ref 1.2–3.5)
LYMPHOCYTES NFR BLD: 28.6 %
MACROCYTES BLD QL SMEAR: NORMAL
MCH RBC QN AUTO: 30.7 PG (ref 28–33.2)
MCHC RBC AUTO-ENTMCNC: 32.4 G/DL (ref 32.2–35.5)
MCV RBC AUTO: 94.8 FL (ref 83–98)
MONOCYTES # BLD: 0.66 K/UL (ref 0.28–0.8)
MONOCYTES NFR BLD: 11.7 %
NEUTROPHILS # BLD: 3.2 K/UL (ref 1.7–7)
NEUTS SEG NFR BLD: 56.9 %
NITRITE UR QL STRIP.AUTO: NEGATIVE
NRBC BLD-RTO: 0 %
OVALOCYTES BLD QL SMEAR: NORMAL
P AXIS: 40
PDW BLD AUTO: 12.5 FL (ref 9.4–12.3)
PH UR STRIP.AUTO: 7 [PH]
PLATELET # BLD AUTO: 147 K/UL
PLATELET # BLD EST: NORMAL 10*3/UL
PLATELET CLUMP BLD QL SMEAR: PRESENT
POTASSIUM SERPL-SCNC: 4.3 MEQ/L (ref 3.6–5.1)
PR INTERVAL: 172
PROT SERPL-MCNC: 6.1 G/DL (ref 6–8.2)
PROT UR QL STRIP.AUTO: NEGATIVE
PROTHROMBIN TIME: 11.8 SEC (ref 12.2–14.5)
QRS DURATION: 80
QT INTERVAL: 390
QTC CALCULATION(BAZETT): 379
R AXIS: 20
RBC # BLD AUTO: 4.01 M/UL (ref 3.93–5.22)
SODIUM SERPL-SCNC: 137 MEQ/L (ref 136–144)
SP GR UR REFRACT.AUTO: 1.01
T WAVE AXIS: 20
UROBILINOGEN UR STRIP-ACNC: 0.2 EU/DL
VENTRICULAR RATE: 57
WBC # BLD AUTO: 5.63 K/UL

## 2019-02-07 PROCEDURE — 93010 ELECTROCARDIOGRAM REPORT: CPT | Performed by: INTERNAL MEDICINE

## 2019-02-07 PROCEDURE — 85025 COMPLETE CBC W/AUTO DIFF WBC: CPT

## 2019-02-07 PROCEDURE — 71046 X-RAY EXAM CHEST 2 VIEWS: CPT

## 2019-02-07 PROCEDURE — 86901 BLOOD TYPING SEROLOGIC RH(D): CPT

## 2019-02-07 PROCEDURE — 93005 ELECTROCARDIOGRAM TRACING: CPT

## 2019-02-07 PROCEDURE — 36415 COLL VENOUS BLD VENIPUNCTURE: CPT

## 2019-02-07 PROCEDURE — 81003 URINALYSIS AUTO W/O SCOPE: CPT

## 2019-02-07 PROCEDURE — 80053 COMPREHEN METABOLIC PANEL: CPT

## 2019-02-07 PROCEDURE — 85610 PROTHROMBIN TIME: CPT

## 2019-02-07 NOTE — ASSESSMENT & PLAN NOTE
Calculated ROPE score with Patient  Told risk of recurrent CVA based on SCORE  Patient will decide  Reviewed data on latest 3 trials regarding closure vs medical therapy of varying degrees

## 2019-02-07 NOTE — PROGRESS NOTES
Cardiology Note       Reason for visit:   Chief Complaint   Patient presents with   • Initial Evaluation      HPI   Orly Lara is a 49 y.o. female who presents for  PFO history of cerebellar CVA        Past Medical History:   Diagnosis Date   • Hypertension    • Positional vertigo    • Stroke (CMS/HCC) (HCC)      Past Surgical History:   Procedure Laterality Date   • HYSTEROSCOPY W/ ENDOMETRIAL ABLATION       Social History     Social History   • Marital status:      Spouse name: N/A   • Number of children: N/A   • Years of education: N/A     Social History Main Topics   • Smoking status: Former Smoker   • Smokeless tobacco: Never Used   • Alcohol use Yes      Comment: socially   • Drug use: Unknown   • Sexual activity: Defer     Other Topics Concern   • None     Social History Narrative    Do you wear your seatbelt? Yes    Do you have smoke detector in your home? Yes    Do you have a carbon monoxide detector in your home? 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    Current Marital Status?          Family History   Problem Relation Age of Onset   • Heart attack Mother    • Hyperlipidemia Mother      Iodinated contrast- oral and iv dye  Current Outpatient Prescriptions   Medication Sig Dispense Refill   • acyclovir (ZOVIRAX) 400 mg tablet Take 400 mg by mouth 3 (three) times a day as needed (for 7 days for cold sore outbreak).       • atorvastatin (LIPITOR) 40 mg tablet Take 1 tablet (40 mg total) by mouth nightly. 90 tablet 1   • cyanocobalamin (VITAMIN B12) 1,000 mcg tablet Take 1,000 mcg by mouth daily.     • Lactobacillus acidophilus (PROBIOTIC ORAL) Take 1 capsule by mouth daily.       No current facility-administered medications for this visit.        Review of Systems   All other systems reviewed and are negative.    Objective   Vitals:    01/29/19 1053   Pulse: 78   Resp: 16       Physical Exam:    General Appearance:  Alert, no distress   Head:  Normocephalic, without obvious  abnormality, atraumatic   Eyes:  Conjunctiva/corneas clear, EOM's intact   Neck: No thyroid enlargement. No JVD. No bruits    Lungs:   Clear to auscultation bilaterally, respirations unlabored, no rales, no wheezing   Heart:  Regular rhythm, S1 and S2 normal, no murmur, rub or gallop   Abdomen:   Soft, non-tender, no masses, no organomegaly   Vascular: Pulses 2+ and symmetric all extremities, no carotid bruit or jugular vein distention   Musculoskeletal:  Skin: No injury or deformity  Skin color, texture, turgor normal, no rashes or lesions, no cyanosis or edema   Extremities: Extremities normal, atraumatic, pulses normal   Behavior/Emotional: Appropriate, cooperative       Lab Results   Component Value Date    WBC 5.63 02/07/2019    HGB 12.3 02/07/2019     (L) 02/07/2019    CHOL 178 10/31/2018    TRIG 123 10/31/2018    HDL 51 (L) 10/31/2018    ALT 21 10/12/2017    AST 25 10/12/2017     10/30/2018    K 3.9 10/30/2018    CREATININE 0.7 10/30/2018    TSH 1.450 01/18/2016    INR 0.9 02/07/2019    HGBA1C 5.3 10/12/2017          ECG   Not applicable.     PFO (patent foramen ovale)  Calculated ROPE score with Patient  Told risk of recurrent CVA based on SCORE  Patient will decide  Reviewed data on latest 3 trials regarding closure vs medical therapy of varying degrees         Isaías Saldana,   2/7/2019

## 2019-02-14 ENCOUNTER — HOSPITAL ENCOUNTER (OUTPATIENT)
Facility: HOSPITAL | Age: 49
Setting detail: HOSPITAL OUTPATIENT SURGERY
Discharge: HOME | End: 2019-02-14
Attending: INTERNAL MEDICINE | Admitting: INTERNAL MEDICINE
Payer: COMMERCIAL

## 2019-02-14 VITALS
SYSTOLIC BLOOD PRESSURE: 116 MMHG | OXYGEN SATURATION: 95 % | HEART RATE: 60 BPM | DIASTOLIC BLOOD PRESSURE: 73 MMHG | RESPIRATION RATE: 17 BRPM

## 2019-02-14 DIAGNOSIS — Q21.12 PFO (PATENT FORAMEN OVALE): ICD-10-CM

## 2019-02-14 DIAGNOSIS — I47.10 SVT (SUPRAVENTRICULAR TACHYCARDIA) (CMS/HCC): Primary | ICD-10-CM

## 2019-02-14 LAB
ABO + RH BLD: NORMAL
B-HCG UR QL: NEGATIVE
D AG BLD QL: POSITIVE
LABORATORY COMMENT REPORT: NORMAL
POCT TEST: NORMAL

## 2019-02-14 PROCEDURE — 99152 MOD SED SAME PHYS/QHP 5/>YRS: CPT | Performed by: INTERNAL MEDICINE

## 2019-02-14 PROCEDURE — 25000000 HC PHARMACY GENERAL: Performed by: INTERNAL MEDICINE

## 2019-02-14 PROCEDURE — C1894 INTRO/SHEATH, NON-LASER: HCPCS | Performed by: INTERNAL MEDICINE

## 2019-02-14 PROCEDURE — C1760 CLOSURE DEV, VASC: HCPCS | Performed by: INTERNAL MEDICINE

## 2019-02-14 PROCEDURE — B244ZZZ ULTRASONOGRAPHY OF RIGHT HEART: ICD-10-PCS | Performed by: INTERNAL MEDICINE

## 2019-02-14 PROCEDURE — 93662 INTRACARDIAC ECG (ICE): CPT

## 2019-02-14 PROCEDURE — 99153 MOD SED SAME PHYS/QHP EA: CPT | Performed by: INTERNAL MEDICINE

## 2019-02-14 PROCEDURE — 63700000 HC SELF-ADMINISTRABLE DRUG: Performed by: INTERNAL MEDICINE

## 2019-02-14 PROCEDURE — 93580 TRANSCATH CLOSURE OF ASD: CPT | Performed by: INTERNAL MEDICINE

## 2019-02-14 PROCEDURE — 85347 COAGULATION TIME ACTIVATED: CPT | Performed by: INTERNAL MEDICINE

## 2019-02-14 PROCEDURE — 71000001 HC PACU PHASE 1 INITIAL 30MIN: Performed by: INTERNAL MEDICINE

## 2019-02-14 PROCEDURE — 63600000 HC DRUGS/DETAIL CODE: Performed by: INTERNAL MEDICINE

## 2019-02-14 PROCEDURE — C1759 CATH, INTRA ECHOCARDIOGRAPHY: HCPCS | Performed by: INTERNAL MEDICINE

## 2019-02-14 PROCEDURE — C1769 GUIDE WIRE: HCPCS | Performed by: INTERNAL MEDICINE

## 2019-02-14 PROCEDURE — 36415 COLL VENOUS BLD VENIPUNCTURE: CPT | Performed by: INTERNAL MEDICINE

## 2019-02-14 PROCEDURE — C1817 SEPTAL DEFECT IMP SYS: HCPCS | Performed by: INTERNAL MEDICINE

## 2019-02-14 PROCEDURE — 27200000 HC STERILE SUPPLY: Performed by: INTERNAL MEDICINE

## 2019-02-14 PROCEDURE — 02U53JZ SUPPLEMENT ATRIAL SEPTUM WITH SYNTHETIC SUBSTITUTE, PERCUTANEOUS APPROACH: ICD-10-PCS | Performed by: INTERNAL MEDICINE

## 2019-02-14 PROCEDURE — 71000011 HC PACU PHASE 1 EA ADDL MIN: Performed by: INTERNAL MEDICINE

## 2019-02-14 DEVICE — IMPLANTABLE DEVICE
Type: IMPLANTABLE DEVICE | Site: HEART | Status: FUNCTIONAL
Brand: AMPLATZER™

## 2019-02-14 DEVICE — DEVICE CLOSURE PERCLOSE-PROGLIDE: Type: IMPLANTABLE DEVICE | Status: FUNCTIONAL

## 2019-02-14 RX ORDER — NITROGLYCERIN 0.4 MG/1
0.4 TABLET SUBLINGUAL EVERY 5 MIN PRN
Status: DISCONTINUED | OUTPATIENT
Start: 2019-02-14 | End: 2019-02-14 | Stop reason: HOSPADM

## 2019-02-14 RX ORDER — ASPIRIN 325 MG
325 TABLET ORAL DAILY
COMMUNITY
End: 2019-02-14 | Stop reason: HOSPADM

## 2019-02-14 RX ORDER — CLOPIDOGREL BISULFATE 75 MG/1
TABLET ORAL AS NEEDED
Status: DISCONTINUED | OUTPATIENT
Start: 2019-02-14 | End: 2019-02-14 | Stop reason: HOSPADM

## 2019-02-14 RX ORDER — DEXTROSE 50 % IN WATER (D50W) INTRAVENOUS SYRINGE
25 AS NEEDED
Status: DISCONTINUED | OUTPATIENT
Start: 2019-02-14 | End: 2019-02-14 | Stop reason: HOSPADM

## 2019-02-14 RX ORDER — CEFAZOLIN SODIUM 2 G/50ML
SOLUTION INTRAVENOUS CONTINUOUS PRN
Status: COMPLETED | OUTPATIENT
Start: 2019-02-14 | End: 2019-02-14

## 2019-02-14 RX ORDER — CLOPIDOGREL BISULFATE 75 MG/1
75 TABLET ORAL DAILY
Status: DISCONTINUED | OUTPATIENT
Start: 2019-02-15 | End: 2019-02-14 | Stop reason: HOSPADM

## 2019-02-14 RX ORDER — HEPARIN SODIUM 1000 [USP'U]/ML
INJECTION, SOLUTION INTRAVENOUS; SUBCUTANEOUS AS NEEDED
Status: DISCONTINUED | OUTPATIENT
Start: 2019-02-14 | End: 2019-02-14 | Stop reason: HOSPADM

## 2019-02-14 RX ORDER — CLOPIDOGREL BISULFATE 75 MG/1
600 TABLET ORAL ONCE
Status: DISCONTINUED | OUTPATIENT
Start: 2019-02-14 | End: 2019-02-14 | Stop reason: HOSPADM

## 2019-02-14 RX ORDER — ATROPINE SULFATE 0.1 MG/ML
0.5 INJECTION INTRAVENOUS EVERY 5 MIN PRN
Status: DISCONTINUED | OUTPATIENT
Start: 2019-02-14 | End: 2019-02-14 | Stop reason: HOSPADM

## 2019-02-14 RX ORDER — ATORVASTATIN CALCIUM 40 MG/1
40 TABLET, FILM COATED ORAL DAILY
COMMUNITY
End: 2019-06-04 | Stop reason: SDDI

## 2019-02-14 RX ORDER — FENTANYL CITRATE 50 UG/ML
INJECTION, SOLUTION INTRAMUSCULAR; INTRAVENOUS AS NEEDED
Status: DISCONTINUED | OUTPATIENT
Start: 2019-02-14 | End: 2019-02-14 | Stop reason: HOSPADM

## 2019-02-14 RX ORDER — ASPIRIN 81 MG/1
81 TABLET ORAL DAILY
Qty: 30 TABLET | Refills: 11 | Status: SHIPPED | OUTPATIENT
Start: 2019-02-14 | End: 2019-07-29

## 2019-02-14 RX ORDER — MIDAZOLAM HYDROCHLORIDE 2 MG/2ML
INJECTION, SOLUTION INTRAMUSCULAR; INTRAVENOUS AS NEEDED
Status: DISCONTINUED | OUTPATIENT
Start: 2019-02-14 | End: 2019-02-14 | Stop reason: HOSPADM

## 2019-02-14 RX ORDER — CLOPIDOGREL BISULFATE 75 MG/1
75 TABLET ORAL DAILY
Qty: 30 TABLET | Refills: 0 | Status: SHIPPED | OUTPATIENT
Start: 2019-02-15 | End: 2019-05-22 | Stop reason: SDUPTHER

## 2019-02-14 RX ORDER — LIDOCAINE HYDROCHLORIDE 10 MG/ML
INJECTION, SOLUTION INFILTRATION; PERINEURAL AS NEEDED
Status: DISCONTINUED | OUTPATIENT
Start: 2019-02-14 | End: 2019-02-14 | Stop reason: HOSPADM

## 2019-02-14 RX ORDER — IBUPROFEN 200 MG
16-32 TABLET ORAL AS NEEDED
Status: DISCONTINUED | OUTPATIENT
Start: 2019-02-14 | End: 2019-02-14 | Stop reason: HOSPADM

## 2019-02-14 RX ORDER — DEXTROSE 40 %
15-30 GEL (GRAM) ORAL AS NEEDED
Status: DISCONTINUED | OUTPATIENT
Start: 2019-02-14 | End: 2019-02-14 | Stop reason: HOSPADM

## 2019-02-14 RX ORDER — ASPIRIN 325 MG
325 TABLET ORAL DAILY
Status: DISCONTINUED | OUTPATIENT
Start: 2019-02-14 | End: 2019-02-14 | Stop reason: HOSPADM

## 2019-02-14 RX ORDER — CLOPIDOGREL BISULFATE 75 MG/1
75 TABLET ORAL DAILY
Qty: 30 TABLET | Refills: 3 | Status: SHIPPED | OUTPATIENT
Start: 2019-02-14 | End: 2019-07-29

## 2019-02-14 RX ADMIN — ASPIRIN 325 MG: 325 TABLET ORAL at 07:57

## 2019-02-14 NOTE — Clinical Note
Closure device placed for the left femoral vein. Closure device used: Perclose. Hemostasis achieved.

## 2019-02-14 NOTE — Clinical Note
Closure device placed for the right femoral vein. Closure device used: Perclose. Hemostasis achieved.

## 2019-02-14 NOTE — NURSING NOTE
Patient ambulatory with steady gait, groins remain CDI, no bleeding, no hematoma. Discharge instructions reviewed with patient and family including: treatments, home wound care, activity restrictions, medications, and follow up. Patient verbalizes understanding of same. Pt with plavix prescription filled and at bedside. Written instructions provided to patient. IV d/c'd with catheter intact. Patient ambulatory with steady gait, denies lightheadedness or dizziness.

## 2019-02-14 NOTE — NURSING NOTE
Patient resting in bed in NAD, tolerating PO's. Called [facility] pharmacy, pharmacy to deliver patient's plavix to bedside, patient and  made aware of same

## 2019-02-18 ENCOUNTER — TELEPHONE (OUTPATIENT)
Dept: CARDIOLOGY | Facility: CLINIC | Age: 49
End: 2019-02-18

## 2019-02-18 ENCOUNTER — TELEPHONE (OUTPATIENT)
Dept: SCHEDULING | Facility: CLINIC | Age: 49
End: 2019-02-18

## 2019-02-18 NOTE — TELEPHONE ENCOUNTER
S/p PFO closure with Dr. Saldana on 2/14. Pt is returning back to work tomorrow. Please fax return to work letter stating pt has no restrictions to Attn: Jacque [phone]

## 2019-02-19 ENCOUNTER — TELEPHONE (OUTPATIENT)
Dept: SCHEDULING | Facility: CLINIC | Age: 49
End: 2019-02-19

## 2019-02-19 NOTE — TELEPHONE ENCOUNTER
Patient called back stating her job is ready to send her home if they do not receive the letter stating no restrictions. Please see msg below for fax number. Call patient at [phone] if there is any issue with letter.

## 2019-02-19 NOTE — TELEPHONE ENCOUNTER
Patient called back inquiring about message below. Please call patient at [phone] with an appointment.

## 2019-02-20 ENCOUNTER — TELEPHONE (OUTPATIENT)
Dept: SCHEDULING | Facility: CLINIC | Age: 49
End: 2019-02-20

## 2019-02-20 NOTE — TELEPHONE ENCOUNTER
Orly called to check if it was ok for her to take Tamiflu, there is a flu epidemic at her job and everyone has to take it.   She can be reached @ [phone]

## 2019-02-22 LAB
POCT ACT-LR: 308 SEC (ref 113–149)
POCT TEST: ABNORMAL

## 2019-03-05 ENCOUNTER — OFFICE VISIT (OUTPATIENT)
Dept: CARDIOLOGY | Facility: CLINIC | Age: 49
End: 2019-03-05
Payer: COMMERCIAL

## 2019-03-05 ENCOUNTER — TELEPHONE (OUTPATIENT)
Dept: CARDIOLOGY | Facility: CLINIC | Age: 49
End: 2019-03-05

## 2019-03-05 VITALS — RESPIRATION RATE: 17 BRPM | HEIGHT: 63 IN | WEIGHT: 172 LBS | BODY MASS INDEX: 30.48 KG/M2

## 2019-03-05 DIAGNOSIS — Q21.12 PFO (PATENT FORAMEN OVALE): Primary | ICD-10-CM

## 2019-03-05 PROCEDURE — 99214 OFFICE O/P EST MOD 30 MIN: CPT | Performed by: INTERNAL MEDICINE

## 2019-03-05 NOTE — PROGRESS NOTES
Cardiology Note       Reason for visit:   Chief Complaint   Patient presents with   • Follow-up      HPI   Orly Lara is a 49 y.o. female who presents for  FU s/p PFO occluder 25 mm Amplatzer      Past Medical History:   Diagnosis Date   • Hypertension    • Positional vertigo    • Stroke (CMS/HCC) (HCC)      Past Surgical History:   Procedure Laterality Date   • HYSTEROSCOPY W/ ENDOMETRIAL ABLATION       Social History     Social History   • Marital status:      Spouse name: N/A   • Number of children: N/A   • Years of education: N/A     Social History Main Topics   • Smoking status: Former Smoker   • Smokeless tobacco: Never Used   • Alcohol use Yes      Comment: socially   • Drug use: Unknown   • Sexual activity: Defer     Other Topics Concern   • None     Social History Narrative    Do you wear your seatbelt? Yes    Do you have smoke detector in your home? Yes    Do you have a carbon monoxide detector in your home? 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    Current Marital Status?          Family History   Problem Relation Age of Onset   • Heart attack Mother    • Hyperlipidemia Mother      Iodinated contrast- oral and iv dye  Current Outpatient Prescriptions   Medication Sig Dispense Refill   • aspirin 81 mg enteric coated tablet Take 1 tablet (81 mg total) by mouth daily. 30 tablet 11   • atorvastatin (LIPITOR) 40 mg tablet Take 40 mg by mouth daily.     • clopidogrel (PLAVIX) 75 mg tablet Take 1 tablet (75 mg total) by mouth daily. 30 tablet 0   • cyanocobalamin (VITAMIN B12) 1,000 mcg tablet Take 1,000 mcg by mouth daily.     • Lactobacillus acidophilus (PROBIOTIC ORAL) Take 1 capsule by mouth daily.     • acyclovir (ZOVIRAX) 400 mg tablet Take 400 mg by mouth 3 (three) times a day as needed (for 7 days for cold sore outbreak).       • clopidogrel (PLAVIX) 75 mg tablet Take 1 tablet (75 mg total) by mouth daily. (Patient not taking: Reported on 3/5/2019 ) 30 tablet 3     No  current facility-administered medications for this visit.        Review of Systems   All other systems reviewed and are negative.    Objective   Vitals:    03/05/19 1104   Resp: 17       Physical Exam:    General Appearance:  Alert, no distress   Head:  Normocephalic, without obvious abnormality, atraumatic   Eyes:  Conjunctiva/corneas clear, EOM's intact   Neck: No thyroid enlargement. No JVD. No bruits    Lungs:   Clear to auscultation bilaterally, respirations unlabored, no rales, no wheezing   Heart:  Regular rhythm, S1 and S2 normal, no murmur, rub or gallop   Abdomen:   Soft, non-tender, no masses, no organomegaly   Vascular: Pulses 2+ and symmetric all extremities, no carotid bruit or jugular vein distention   Musculoskeletal:  Skin: No injury or deformity  Skin color, texture, turgor normal, no rashes or lesions, no cyanosis or edema   Extremities: Extremities normal, atraumatic, pulses normal   Behavior/Emotional: Appropriate, cooperative       Lab Results   Component Value Date    WBC 5.63 02/07/2019    HGB 12.3 02/07/2019     (L) 02/07/2019    CHOL 178 10/31/2018    TRIG 123 10/31/2018    HDL 51 (L) 10/31/2018    ALT 24 02/07/2019    AST 27 02/07/2019     02/07/2019    K 4.3 02/07/2019    CREATININE 0.7 02/07/2019    TSH 1.450 01/18/2016    INR 0.9 02/07/2019    HGBA1C 5.3 10/12/2017          ECG   Not applicable.     PFO (patent foramen ovale)  Echo need for followup sutyd x 1  Endocarditis prophylaxis dw patient  DAP for 6 months        Isaías Saldana,   3/5/2019

## 2019-03-13 ENCOUNTER — HOSPITAL ENCOUNTER (OUTPATIENT)
Dept: CARDIOLOGY | Facility: CLINIC | Age: 49
Discharge: HOME | End: 2019-03-13
Payer: COMMERCIAL

## 2019-03-13 VITALS
DIASTOLIC BLOOD PRESSURE: 80 MMHG | HEIGHT: 63 IN | BODY MASS INDEX: 30.48 KG/M2 | SYSTOLIC BLOOD PRESSURE: 130 MMHG | WEIGHT: 172 LBS

## 2019-03-13 DIAGNOSIS — Q21.12 PFO (PATENT FORAMEN OVALE): ICD-10-CM

## 2019-03-13 LAB
AORTIC ROOT ANNULUS: 3 CM
ASCENDING AORTA: 3.9 CM
BSA FOR ECHO PROCEDURE: 1.86 M2
E WAVE DECELERATION TIME: 254 MS
E/A RATIO: 1.1
E/E' RATIO: 10.2
E/LAT E' RATIO: 7
EDV (BP): 79.9 CM3
EF (A4C): 69.8 %
EF A2C: 70.6 %
EJECTION FRACTION: 70.1 %
EST RIGHT VENT SYSTOLIC PRESSURE BY TRICUSPID REGURGITATION JET: 26 MMHG
ESV (BP): 23.9 CM3
FRACTIONAL SHORTENING: 35.8 %
INTERVENTRICULAR SEPTUM: 0.96 CM
LA ESV (BP): 59 CM3
LA ESV INDEX (A2C): 25.81 CM3/M2
LA ESV INDEX (BP): 31.72 CM3/M2
LA/AORTA RATIO: 1.3
LAAS-AP2: 18 CM2
LAAS-AP4: 20.9 CM2
LAD 2D: 3.9 CM
LALD A4C: 5.23 CM
LALD A4C: 5.52 CM
LAV-S: 48 CM3
LEFT ATRIUM VOLUME INDEX: 37.37 CM3/M2
LEFT ATRIUM VOLUME: 69.5 CM3
LEFT INTERNAL DIMENSION IN SYSTOLE: 2.38 CM (ref 2.52–3.82)
LEFT VENTRICLE DIASTOLIC VOLUME INDEX: 38.66 CM3/M2
LEFT VENTRICLE DIASTOLIC VOLUME: 71.9 CM3
LEFT VENTRICLE SYSTOLIC VOLUME INDEX: 11.67 CM3/M2
LEFT VENTRICLE SYSTOLIC VOLUME: 21.7 CM3
LEFT VENTRICULAR INTERNAL DIMENSION IN DIASTOLE: 3.71 CM (ref 4.26–5.91)
LEFT VENTRICULAR POSTERIOR WALL IN END DIASTOLE: 0.87 CM (ref 0.56–1.04)
LV DIASTOLIC VOLUME: 86.3 CM3
LV ESV (APICAL 2 CHAMBER): 25.4 CM3
LVAD-AP2: 27.9 CM2
LVAD-AP4: 25.2 CM2
LVAS-AP2: 13.3 CM2
LVAS-AP4: 12.5 CM2
LVEDVI(A2C): 46.4 CM3/M2
LVEDVI(BP): 42.96 CM3/M2
LVESVI(A2C): 13.66 CM3/M2
LVESVI(BP): 12.85 CM3/M2
LVLD-AP2: 7.55 CM
LVLD-AP4: 7.28 CM
LVLS-AP2: 5.91 CM
LVLS-AP4: 6.16 CM
LVOT 2D: 1.8 CM
LVOT A: 2.54 CM2
MV E'TISSUE VEL-LAT: 0.13 M/S
MV E'TISSUE VEL-MED: 0.09 M/S
MV PEAK A VEL: 0.83 M/S
MV PEAK E VEL: 0.89 M/S
POSTERIOR WALL: 0.87 CM
PULMONARY REGURGITATION LATE DIASTOLIC VELOCITY: 1.18 M/S
RAP: 5 MMHG
RVOT VMAX: 0.85 M/S
TR MAX PG: 21 MMHG
TRICUSPID VALVE PEAK REGURGITATION VELOCITY: 2.29 M/S
Z-SCORE OF LEFT VENTRICULAR DIMENSION IN END DIASTOLE: -3.02
Z-SCORE OF LEFT VENTRICULAR DIMENSION IN END SYSTOLE: -2.09
Z-SCORE OF LEFT VENTRICULAR POSTERIOR WALL IN END DIASTOLE: 0.7

## 2019-03-13 PROCEDURE — 93321 DOPPLER ECHO F-UP/LMTD STD: CPT | Performed by: INTERNAL MEDICINE

## 2019-03-13 PROCEDURE — 93325 DOPPLER ECHO COLOR FLOW MAPG: CPT | Performed by: INTERNAL MEDICINE

## 2019-03-13 PROCEDURE — 93308 TTE F-UP OR LMTD: CPT | Performed by: INTERNAL MEDICINE

## 2019-04-03 ENCOUNTER — TELEPHONE (OUTPATIENT)
Dept: FAMILY MEDICINE | Facility: CLINIC | Age: 49
End: 2019-04-03

## 2019-04-03 RX ORDER — OSELTAMIVIR PHOSPHATE 75 MG/1
75 CAPSULE ORAL DAILY
Qty: 7 CAPSULE | Refills: 0 | Status: SHIPPED | OUTPATIENT
Start: 2019-04-03 | End: 2019-07-29

## 2019-04-03 NOTE — TELEPHONE ENCOUNTER
Allyson was in today and diagnosed with the flu. Orly is wondering if preventative tamiflu could be sent in for the rest of the family? (Avinash Villalba and Mary Jane- putting note in each chart)

## 2019-05-22 ENCOUNTER — OFFICE VISIT (OUTPATIENT)
Dept: FAMILY MEDICINE | Facility: CLINIC | Age: 49
End: 2019-05-22
Payer: COMMERCIAL

## 2019-05-22 VITALS
WEIGHT: 170 LBS | OXYGEN SATURATION: 97 % | BODY MASS INDEX: 30.12 KG/M2 | TEMPERATURE: 98.2 F | HEIGHT: 63 IN | HEART RATE: 82 BPM | DIASTOLIC BLOOD PRESSURE: 82 MMHG | SYSTOLIC BLOOD PRESSURE: 127 MMHG

## 2019-05-22 DIAGNOSIS — Z00.00 ROUTINE PHYSICAL EXAMINATION: Primary | ICD-10-CM

## 2019-05-22 DIAGNOSIS — E78.2 MIXED HYPERLIPIDEMIA: ICD-10-CM

## 2019-05-22 DIAGNOSIS — I67.1 BRAIN ANEURYSM: ICD-10-CM

## 2019-05-22 DIAGNOSIS — I63.9 CEREBELLAR INFARCT (CMS/HCC): ICD-10-CM

## 2019-05-22 DIAGNOSIS — Q21.12 PFO (PATENT FORAMEN OVALE): ICD-10-CM

## 2019-05-22 DIAGNOSIS — Z12.39 SCREENING FOR BREAST CANCER: ICD-10-CM

## 2019-05-22 PROCEDURE — 99396 PREV VISIT EST AGE 40-64: CPT | Performed by: NURSE PRACTITIONER

## 2019-05-22 RX ORDER — ASPIRIN 81 MG/1
81 TABLET ORAL DAILY
Qty: 90 TABLET | Refills: 1 | Status: SHIPPED | OUTPATIENT
Start: 2019-05-22 | End: 2024-09-30

## 2019-05-22 RX ORDER — CLOPIDOGREL BISULFATE 75 MG/1
75 TABLET ORAL DAILY
Qty: 90 TABLET | Refills: 0 | Status: SHIPPED | OUTPATIENT
Start: 2019-05-22 | End: 2019-08-02

## 2019-05-22 RX ORDER — ASPIRIN 81 MG/1
81 TABLET ORAL DAILY
COMMUNITY
End: 2019-05-22 | Stop reason: SDUPTHER

## 2019-05-22 NOTE — PROGRESS NOTES
[place] [address]  [phone]       Reason for visit:   Chief Complaint   Patient presents with   • Annual Exam     Physical       HPI   Orly Lara is a 49 y.o. female who presents for routine annual physical exam    PMHx/PSHx: Cerebellar infarct, PFO repair 2/2019  Specialists   Ophthalmology: yes, annual eye exams   Dermatology: yes, annual skin check   Gynecology: yes, annual gyn exam              Cardiology: Dr. Dumont              Neurology: Dr. Pizarro at [facility]     Complaints: Was having bad muscle cramps and stopped taking her statin x1 month ago    Social  Diet: Generally healthy, conscious of her diet but started eating meat again for more protein source  Exercise: walks, spinning, weights, tennis  Sleep: No complaints  EtOH: Occasional  Tobacco: Denies  Drugs: Denies  Lives with: , two daughters    FamHx: No changes  GynHx: Menses - Irregular, endometrial ablation 2014; SA/Contraception - 1 partner/, no complaints    Pap: UTD  Mammo: Scheduled 5/2019  Colonoscopy: N/A, start at 52  Immunizations: UTD          Past Medical History:   Diagnosis Date   • Hypertension    • Positional vertigo    • Stroke (CMS/HCC) (HCC)      Past Surgical History:   Procedure Laterality Date   • HYSTEROSCOPY W/ ENDOMETRIAL ABLATION     • PATENT FORAMEN OVALE CLOSURE       Social History     Social History   • Marital status:      Spouse name: N/A   • Number of children: N/A   • Years of education: N/A     Occupational History   • Not on file.     Social History Main Topics   • Smoking status: Former Smoker   • Smokeless tobacco: Never Used   • Alcohol use Yes      Comment: socially   • Drug use: Unknown   • Sexual activity: Defer     Other Topics Concern   • Not on file     Social History Narrative    Do you wear your seatbelt? Yes    Do you have smoke detector in your home? Yes    Do you have a carbon monoxide detector in your  home? 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    Current Marital Status?          Family History   Problem Relation Age of Onset   • Heart attack Mother    • Hyperlipidemia Mother      Iodinated contrast- oral and iv dye  Current Outpatient Prescriptions   Medication Sig Dispense Refill   • aspirin 81 mg enteric coated tablet Take 1 tablet (81 mg total) by mouth daily. 90 tablet 1   • atorvastatin (LIPITOR) 40 mg tablet Take 40 mg by mouth daily.     • cyanocobalamin (VITAMIN B12) 1,000 mcg tablet Take 1,000 mcg by mouth daily.     • Lactobacillus acidophilus (PROBIOTIC ORAL) Take 1 capsule by mouth daily.     • aspirin 81 mg enteric coated tablet Take 1 tablet (81 mg total) by mouth daily. 30 tablet 11   • clopidogrel (PLAVIX) 75 mg tablet Take 1 tablet (75 mg total) by mouth daily. (Patient not taking: Reported on 3/5/2019 ) 30 tablet 3   • clopidogrel (PLAVIX) 75 mg tablet Take 1 tablet (75 mg total) by mouth daily. 90 tablet 0     No current facility-administered medications for this visit.        Review of Systems  Objective   Vitals:    05/22/19 1508   BP: 127/82   Pulse: 82   Temp: 36.8 °C (98.2 °F)   SpO2: 97%       Physical Exam    Procedures        Assessment   Problem List Items Addressed This Visit     Mixed hyperlipidemia     - Currently off statin due to side effects, muscle cramps  - Advised her that due to hx CVA she should be optimally medically managed on statin (though we think PFO was likely the cause).   - Will check LP and re-assess         PFO (patent foramen ovale)    Relevant Medications    aspirin 81 mg enteric coated tablet    clopidogrel (PLAVIX) 75 mg tablet    Cerebellar infarct (CMS/HCC) (HCC)     - Intermittent vertigo (unsure if this is from infarct) episodes mild stable, no other residual or deficits  - PFO repair 2/2019 Dr. Isaías Saldana at [facility]  - Hematology Dr. Barrera at [facility] coagulopathy work up normal unrevealing  - BP well controlled, daily aspirin  "and statin recommended         Brain aneurysm     - Incidental finding on CTA Brain 12/7/2018 \"3 mm saccular aneurysm directed anteriorly from the left carotid terminus\"  - Stable. Pt currently following Neurovascular specialist [facility] at [place], Dr. Anastasia Pizarro. Next follow up imaging due 11/2019 (annually)           Other Visit Diagnoses     Routine physical examination    -  Primary    Relevant Orders    Comprehensive metabolic panel    CBC and Differential    Hemoglobin A1c    Lipid panel    TSH 3rd Generation    Screening for breast cancer        Relevant Orders    BI SCREENING MAMMOGRAM BILATERAL      - Counseled on general health maintenance & reviewed preventive screening guidelines  - VS normal, BMI: 30 - Recommending weight loss, counseled on healthy, well-balanced diet and exercise. Recommend Nutritionist/dietician - pt declines.   - Discussed stress management and adequate sleep  - Regular follow up with GYN annually, w/ pap screening as recommended.   - Continue skin cancer screening by Dermatology.   - Continue eye exams by Ophtho  - Routine labs - send out for fasting  - Immunizations: UTD  - Recommend daily multivitamin, Vitamin D supplement    - Off statin x1 month, check LP.  - [facility]  - [facility] w/ re-imaging 11/2019 re: aneurysm        DEIRDRE Encarnacion  5/23/2019           "

## 2019-05-23 PROBLEM — I67.1 BRAIN ANEURYSM: Status: ACTIVE | Noted: 2019-05-23

## 2019-05-23 PROBLEM — I63.9 CEREBELLAR INFARCT (CMS/HCC): Status: ACTIVE | Noted: 2019-05-23

## 2019-05-23 NOTE — ASSESSMENT & PLAN NOTE
- Currently off statin due to side effects, muscle cramps  - Advised her that due to hx CVA she should be optimally medically managed on statin (though we think PFO was likely the cause).   - Will check LP and re-assess

## 2019-05-23 NOTE — ASSESSMENT & PLAN NOTE
- Intermittent vertigo (unsure if this is from infarct) episodes mild stable, no other residual or deficits  - PFO repair 2/2019 Dr. Isaías Saldana at [facility]  - Hematology Dr. Barrera at [place] coagulopathy work up normal unrevealing  - BP well controlled, daily aspirin and statin recommended

## 2019-05-23 NOTE — ASSESSMENT & PLAN NOTE
"- Incidental finding on CTA Brain 12/7/2018 \"3 mm saccular aneurysm directed anteriorly from the left carotid terminus\"  - Stable. Pt currently following Neurovascular specialist [facility] at [place], Dr. Anastasia Pizarro. Next follow up imaging due 11/2019 (annually)  "

## 2019-05-29 ENCOUNTER — TELEPHONE (OUTPATIENT)
Dept: FAMILY MEDICINE | Facility: CLINIC | Age: 49
End: 2019-05-29

## 2019-05-29 DIAGNOSIS — Z12.39 SCREENING FOR BREAST CANCER: ICD-10-CM

## 2019-05-29 PROCEDURE — 77067 SCR MAMMO BI INCL CAD: CPT | Mod: 26 | Performed by: NURSE PRACTITIONER

## 2019-05-31 LAB
BASOPHILS # BLD AUTO: 0 X10E3/UL (ref 0–0.2)
BASOPHILS NFR BLD AUTO: 0 %
EOSINOPHIL # BLD AUTO: 0.1 X10E3/UL (ref 0–0.4)
EOSINOPHIL NFR BLD AUTO: 2 %
ERYTHROCYTE [DISTWIDTH] IN BLOOD BY AUTOMATED COUNT: 14.6 % (ref 12.3–15.4)
HCT VFR BLD AUTO: 40 % (ref 34–46.6)
HGB BLD-MCNC: 13.1 G/DL (ref 11.1–15.9)
IMM GRANULOCYTES # BLD AUTO: 0 X10E3/UL (ref 0–0.1)
IMM GRANULOCYTES NFR BLD AUTO: 0 %
LYMPHOCYTES # BLD AUTO: 2.1 X10E3/UL (ref 0.7–3.1)
LYMPHOCYTES NFR BLD AUTO: 36 %
MCH RBC QN AUTO: 30 PG (ref 26.6–33)
MCHC RBC AUTO-ENTMCNC: 32.8 G/DL (ref 31.5–35.7)
MCV RBC AUTO: 92 FL (ref 79–97)
MONOCYTES # BLD AUTO: 0.3 X10E3/UL (ref 0.1–0.9)
MONOCYTES NFR BLD AUTO: 6 %
NEUTROPHILS # BLD AUTO: 3.3 X10E3/UL (ref 1.4–7)
NEUTROPHILS NFR BLD AUTO: 56 %
PLATELET # BLD AUTO: 180 X10E3/UL (ref 150–450)
RBC # BLD AUTO: 4.36 X10E6/UL (ref 3.77–5.28)
WBC # BLD AUTO: 5.8 X10E3/UL (ref 3.4–10.8)

## 2019-06-01 LAB
ALBUMIN SERPL-MCNC: 4.3 G/DL (ref 3.5–5.5)
ALBUMIN/GLOB SERPL: 2 {RATIO} (ref 1.2–2.2)
ALP SERPL-CCNC: 46 IU/L (ref 39–117)
ALT SERPL-CCNC: 22 IU/L (ref 0–32)
AST SERPL-CCNC: 23 IU/L (ref 0–40)
BILIRUB SERPL-MCNC: 0.7 MG/DL (ref 0–1.2)
BUN SERPL-MCNC: 13 MG/DL (ref 6–24)
BUN/CREAT SERPL: 17 (ref 9–23)
CALCIUM SERPL-MCNC: 9.8 MG/DL (ref 8.7–10.2)
CHLORIDE SERPL-SCNC: 102 MMOL/L (ref 96–106)
CHOLEST SERPL-MCNC: 259 MG/DL (ref 100–199)
CO2 SERPL-SCNC: 23 MMOL/L (ref 20–29)
CREAT SERPL-MCNC: 0.77 MG/DL (ref 0.57–1)
GLOBULIN SER CALC-MCNC: 2.2 G/DL (ref 1.5–4.5)
GLUCOSE SERPL-MCNC: 103 MG/DL (ref 65–99)
HBA1C MFR BLD: 5.4 % (ref 4.8–5.6)
HDLC SERPL-MCNC: 67 MG/DL
LAB CORP EGFR IF AFRICN AM: 106 ML/MIN/1.73
LAB CORP EGFR IF NONAFRICN AM: 92 ML/MIN/1.73
LDLC SERPL CALC-MCNC: 176 MG/DL (ref 0–99)
POTASSIUM SERPL-SCNC: 4.3 MMOL/L (ref 3.5–5.2)
PROT SERPL-MCNC: 6.5 G/DL (ref 6–8.5)
SODIUM SERPL-SCNC: 137 MMOL/L (ref 134–144)
TRIGL SERPL-MCNC: 78 MG/DL (ref 0–149)
TSH SERPL DL<=0.005 MIU/L-ACNC: 2.75 UIU/ML (ref 0.45–4.5)
VLDLC SERPL CALC-MCNC: 16 MG/DL (ref 5–40)

## 2019-06-04 ENCOUNTER — TELEPHONE (OUTPATIENT)
Dept: FAMILY MEDICINE | Facility: CLINIC | Age: 49
End: 2019-06-04

## 2019-07-15 ENCOUNTER — TELEPHONE (OUTPATIENT)
Dept: SCHEDULING | Facility: CLINIC | Age: 49
End: 2019-07-15

## 2019-07-15 NOTE — TELEPHONE ENCOUNTER
Pt call to be Advised on Medication ,Plavix.   Per pt had PFO closure and told she would be on Plavix for 6 months.   Pt states it's coming up to that 6 months and asking if she to continue taking?  Please call pt # [phone]

## 2019-07-16 ENCOUNTER — TELEPHONE (OUTPATIENT)
Dept: CARDIOLOGY | Facility: CLINIC | Age: 49
End: 2019-07-16

## 2019-07-16 ENCOUNTER — TELEPHONE (OUTPATIENT)
Dept: SCHEDULING | Facility: CLINIC | Age: 49
End: 2019-07-16

## 2019-07-16 NOTE — TELEPHONE ENCOUNTER
Spoke with patient.  Confirmed that at 6 months post PFO closure, it is safe from our standpoint to discontinue Plavix.  She should remain on aspirin 81 mg.  I also recommended she reach out to her cardiologist, Dr. Dumont, in the event that she feels it is appropriate for her to continue for any other reason.  She agrees to do so.

## 2019-07-29 ENCOUNTER — OFFICE VISIT (OUTPATIENT)
Dept: FAMILY MEDICINE | Facility: CLINIC | Age: 49
End: 2019-07-29
Payer: COMMERCIAL

## 2019-07-29 VITALS
SYSTOLIC BLOOD PRESSURE: 126 MMHG | OXYGEN SATURATION: 97 % | HEIGHT: 63 IN | HEART RATE: 66 BPM | TEMPERATURE: 98 F | WEIGHT: 166 LBS | BODY MASS INDEX: 29.41 KG/M2 | DIASTOLIC BLOOD PRESSURE: 84 MMHG

## 2019-07-29 DIAGNOSIS — J06.9 ACUTE URI: Primary | ICD-10-CM

## 2019-07-29 PROCEDURE — 99213 OFFICE O/P EST LOW 20 MIN: CPT | Performed by: FAMILY MEDICINE

## 2019-07-29 RX ORDER — ASCORBIC ACID 500 MG
500 TABLET ORAL DAILY
COMMUNITY

## 2019-07-29 ASSESSMENT — ENCOUNTER SYMPTOMS
DIZZINESS: 0
WEIGHT LOSS: 0
TREMORS: 0
CONSTIPATION: 0
NAUSEA: 0
FALLS: 0
FATIGUE: 1
MUSCLE CRAMPS: 0
VERTIGO: 1
ORTHOPNEA: 0
WHEEZING: 0
ABDOMINAL PAIN: 0
HEADACHES: 0
JAUNDICE: 0
HEMATOLOGIC/LYMPHATIC NEGATIVE: 1
FEVER: 0
SYNCOPE: 0
CHILLS: 0
CLAUDICATION: 0
IRREGULAR HEARTBEAT: 0
SPUTUM PRODUCTION: 0
CHANGE IN BOWEL HABIT: 0
NUMBNESS: 0
FOCAL WEAKNESS: 0
DYSPNEA ON EXERTION: 0
ANOREXIA: 0
VOMITING: 0
PALPITATIONS: 0
HEMOPTYSIS: 0
ABDOMINAL PAIN: 0
HEARTBURN: 0
INSOMNIA: 0
LIGHT-HEADEDNESS: 0
NAUSEA: 0
NEAR-SYNCOPE: 0
MYALGIAS: 0
SHORTNESS OF BREATH: 0
PND: 0
NERVOUS/ANXIOUS: 0
HOARSE VOICE: 0
SLEEP DISTURBANCES DUE TO BREATHING: 0
COUGH: 0
BRUISES/BLEEDS EASILY: 0
COUGH: 0
MEMORY LOSS: 0
SNORING: 0
FREQUENCY: 0
DIARRHEA: 0
WEIGHT GAIN: 0

## 2019-07-29 NOTE — PROGRESS NOTES
Cardiology Consult/New Patient    Bethany Celestin CRNP          Orly Lara is a 49 y.o. female identifies as who presents with ']]]]]]    I had last seen her November 2018  She returns after PFO repair with Dr. Saldana and placement of an Amplatzer device February  She had a history of a stroke with right cerebellar infarction seen on MRI in 2016  I met her and wanted to work-up etiology such as paroxysmal atrial fibrillation or PFO  Transesophageal echo documented patent foramen ovale  She was evaluated by Dr. 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 Anastasia Pizarro  Who is repeating MRI in November  She follows with neurologist Dr. Sree Wells    Lab work reviewed from May remarkable for significant hyperlipidemia with LDL cholesterol 180 and she does have nonobstructive plaque seen on aortic with transesophageal echocardiogram in the past and CTA of her carotids from November 2018    With documented early arterial sclerosis on transesophageal echo and CT angiogram  I am a big e believer that she needs statins  She felt tired on Lipitor and stopped  She brought me some lab work she was happy that her LDL remnant was high and is trying not to take statins    The particle number is quite high and her LDL totals of 190  Again I feel strongly that a statin is indicated  I asked her to follow-up with a lipid neurologist Dr. Gamboa, see what he suggests                     Patient Active Problem List    Diagnosis Date Noted   • Hyperglycemia 08/02/2019   • Dilated aortic root (CMS/HCC) ([facility]) 08/02/2019   • PVD (peripheral vascular disease) (CMS/HCC) ([facility]) 08/02/2019   • Cerebellar infarct (CMS/HCC) ([facility]) 05/23/2019   • Brain aneurysm 05/23/2019   • SVT (supraventricular tachycardia) (CMS/HCC) ([facility]) 12/14/2018   • PFO (patent foramen ovale) 11/30/2018   • Mixed hyperlipidemia 11/15/2018   •  Stroke (CMS/HCC) ([facility]) 11/08/2018   • Vertigo 10/30/2018   • Vestibular disequilibrium, left 08/05/2016       Medical History:   Past Medical History:   Diagnosis Date   • Hypertension    • Positional vertigo    • Stroke (CMS/[facility]) ([facility])        Surgical History:   Past Surgical History:   Procedure Laterality Date   • HYSTEROSCOPY W/ ENDOMETRIAL ABLATION     • PATENT FORAMEN OVALE CLOSURE         Allergies: Iodinated contrast- oral and iv dye and Lipitor [atorvastatin]    Current Outpatient Prescriptions   Medication Sig Dispense Refill   • ascorbic acid (ascorbic acid with jason hips) 500 mg tablet Take 500 mg by mouth daily.     • aspirin 81 mg enteric coated tablet Take 1 tablet (81 mg total) by mouth daily. 90 tablet 1   • cyanocobalamin (VITAMIN B12) 1,000 mcg tablet Take 1,000 mcg by mouth daily.     • Lactobacillus acidophilus (PROBIOTIC ORAL) Take 1 capsule by mouth daily.       No current facility-administered medications for this visit.        Social History:   Social History     Social History   • Marital status:      Spouse name: N/A   • Number of children: N/A   • Years of education: N/A     Social History Main Topics   • Smoking status: Former Smoker   • Smokeless tobacco: Never Used   • Alcohol use Yes      Comment: socially   • Drug use: Unknown   • Sexual activity: Defer     Other Topics Concern   • None     Social History Narrative    Do you wear your seatbelt? Yes    Do you have smoke detector in your home? Yes    Do you have a carbon monoxide detector in your home? 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    Current Marital Status?            Family History:   Family History   Problem Relation Age of Onset   • Heart attack Mother    • Hyperlipidemia Mother        Review of Systems   Review of Systems   Constitution: Negative for malaise/fatigue, weight gain and weight loss.   HENT: Negative for hearing loss and hoarse voice.    Eyes: Negative for visual disturbance.    Cardiovascular: Negative for chest pain, claudication, cyanosis, dyspnea on exertion, irregular heartbeat, leg swelling, near-syncope, orthopnea, palpitations, paroxysmal nocturnal dyspnea and syncope.   Respiratory: Negative for cough, hemoptysis, shortness of breath, sleep disturbances due to breathing, snoring, sputum production and wheezing.    Endocrine: Negative for cold intolerance and heat intolerance.   Hematologic/Lymphatic: Negative.  Negative for bleeding problem. Does not bruise/bleed easily.   Skin: Negative.  Negative for rash.   Musculoskeletal: Negative for arthritis, falls, joint pain, muscle cramps and myalgias.   Gastrointestinal: Negative for abdominal pain, anorexia, change in bowel habit, constipation, diarrhea, dysphagia, heartburn, jaundice and nausea.   Genitourinary: Negative for frequency and nocturia.   Neurological: Positive for vertigo. Negative for dizziness, focal weakness, headaches, light-headedness, numbness and tremors.   Psychiatric/Behavioral: Negative for memory loss. The patient does not have insomnia and is not nervous/anxious.    Allergic/Immunologic: Negative for hives.       Objective       Vitals:    08/02/19 1514   BP: 124/86   Pulse: 67   SpO2: 96%       Physical Exam   Constitutional: She is oriented to person, place, and time. She appears well-developed and well-nourished. No distress.   HENT:   Head: Normocephalic and atraumatic.   Nose: Nose normal.   Eyes: Conjunctivae are normal. No scleral icterus.   Neck: No JVD present.   Cardiovascular: Normal rate, regular rhythm, normal heart sounds and intact distal pulses.  Exam reveals no gallop and no friction rub.    No murmur heard.  Pulmonary/Chest: Effort normal. No stridor. No respiratory distress. She has no wheezes. She has no rales. She exhibits no tenderness.   Abdominal: There is no tenderness.   Musculoskeletal: She exhibits no edema or deformity.   Neurological: She is alert and oriented to person, place,  and time.   Skin: Skin is warm and dry.   Psychiatric: She has a normal mood and affect.        Labs   Lab Results   Component Value Date    WBC 5.8 05/31/2019    HGB 13.1 05/31/2019    HCT 40.0 05/31/2019     05/31/2019    CHOL 259 (H) 05/31/2019    TRIG 78 05/31/2019    HDL 67 05/31/2019    ALT 22 05/31/2019    AST 23 05/31/2019     05/31/2019    K 4.3 05/31/2019     05/31/2019    CREATININE 0.77 05/31/2019    BUN 13 05/31/2019    CO2 23 05/31/2019    TSH 2.750 05/31/2019    INR 0.9 02/07/2019    HGBA1C 5.4 05/31/2019     LDL CHOL 179 HDL 67    HDL 39  Small   LDL size 22  HDLC 76  LDL C1 93  LDL particle #2100  Total cholesterol 286                    Imaging    CATH/cv surgery  PFO REPAIR 2/19 AMPLATZER DEVICE DR MARROQUIN    CAT SCAN  Cor garett score zero 2015 thymus tissue    \        ECHO  Gavin 11/18 EF 65%  PFO with left-to-right shunt plaque seen on aorta  agitated saline.       1. Normal left ventricular wall thickness and cavity with preserved systolic function. Estimated EF 65%. No regional wall motion abnormalities.   2. Three cusped aortic valve.  Mild aortic regurgitation.  Normal aortic dimensions. Simple atheroma in the visualized portions of the descending aorta without complex elements.   3. Normal mitral valve.  Trace mitral regurgitation.  Systolic predominance of pulmonary vein flow by spectral doppler.    4. Normal left atrial size. No thrombus or mass seen in the left atrium or left atrial appendage. Left atrial appendage peak emptying velocity 40cm/s.  5. Evidence of small PFO with left to right shunt with agitated saline.       Echo march 2019 aorta 3.9  Ef70% can't exclude smal residual pfo       MRI   mri head 10/18 r cerebellar old infarc  cta brain 11/18 left carotid terminus aneurysm 3 mm, chronic lacunar infarct left cerebellum    cta head 11/18 3mm anudryeleft carotid terminus  Mild plaque L internal caroitd    ELECTROPHYSIOLOGY  12/18 monitor 10 beat  svt    ECG normla  sinus rhythm       Assessment/Plan     PFO (patent foramen ovale)  I met t her November 2018 when she presented with a stroke left cerebellum work-up revealed patent foramen ovale  She underwent successful percutaneous closure November 2018  Last echo in March trace flow across defect suspect will resolve  He has been on dual antiplatelet for 6 months we will continue now on aspirin alone  Encourage antibiotic prophylaxis  She has no known obstructive CAD with coronary calcium score of 0 done in 2015    Cerebellar infarct (CMS/[facility]) ([facility])  As noted above left cerebellar cTA done November showed minimal plaque  She will continue long-term aspirin and ibuprofen and statin therapy long discussion with her about this  Described below    Brain aneurysm  Incidental 3 mm 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 Crestor  She is reluctant  So the plan is up and have her evaluated by Dr. Orville Gamboa at [facility] and see what he suggests    PVD (peripheral vascular disease) (CMS/[facility]) ([facility])  Transesophageal echocardiogram done to diagnose a PFO in November evidence of atheroma was seen           Status post PFO occlusion2/19   history of cerebellar stroke doing   we stopped Plavix today continue aspirin    She has minimal plaque seen on other testing  including coronary CT angiogram and transesophageal echo plaque seen on her aortic past  She has probable heterozygous familial with baseline LDL cholesterol 190  I would love her to be on statin   she had some mild issues with Lipitor and I would like to his Crestor  She brought me some more detailed lipid values and is trying to argue the case that she does not need statin therapy  We Came up with a plan to have her evaluated by an expert on lipids, Dr Raj Sweeney  And will see what his suggestions are  Other issues were cerebral aneurysm picked up incidentally that is being followed by Dr. Pizarro at [place]    I will see her back in 3 months with resting echocardiogram she was concerned there was trace flow on previous echocardiogram I suspect that will resolve      This letter was generated using speech recognition software.  Please excuse any typographical errors.    This letter was generated using speech recognition software.  Please excuse any typographical errors.    Luis Dumont MD  8/2/2019

## 2019-07-29 NOTE — PATIENT INSTRUCTIONS
Try tylenol/motrin, mucinex, lozenges, salt-water gargles for your symptoms. Try to get plenty of rest and wash your hands frequently.   Call if your symptoms last 7+ days.     Thanks,   Dr. Navarro

## 2019-07-29 NOTE — PROGRESS NOTES
[place] [address]  [phone]       Reason for visit:   Chief Complaint   Patient presents with   • Possible Sinus Infection      HPI   Orly Lara is a 49 y.o. female who presents with 2 days of URI symptoms.      Bothered the most by facial pressure.   No fever.   Headache over the weekend but not currently.   +sore throat.   +post-nasal drip.   No cough.   +fatigue.   No chills.      Past Medical History:   Diagnosis Date   • Hypertension    • Positional vertigo    • Stroke (CMS/HCC) (HCC)      Past Surgical History:   Procedure Laterality Date   • HYSTEROSCOPY W/ ENDOMETRIAL ABLATION     • PATENT FORAMEN OVALE CLOSURE       Social History     Social History   • Marital status:      Spouse name: N/A   • Number of children: N/A   • Years of education: N/A     Occupational History   • Not on file.     Social History Main Topics   • Smoking status: Former Smoker   • Smokeless tobacco: Never Used   • Alcohol use Yes      Comment: socially   • Drug use: Unknown   • Sexual activity: Defer     Other Topics Concern   • Not on file     Social History Narrative    Do you wear your seatbelt? Yes    Do you have smoke detector in your home? Yes    Do you have a carbon monoxide detector in your home? 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    Current Marital Status?          Family History   Problem Relation Age of Onset   • Heart attack Mother    • Hyperlipidemia Mother      Iodinated contrast- oral and iv dye  Current Outpatient Prescriptions   Medication Sig Dispense Refill   • ascorbic acid (ascorbic acid with jason hips) 500 mg tablet Take 500 mg by mouth daily.     • aspirin 81 mg enteric coated tablet Take 1 tablet (81 mg total) by mouth daily. 90 tablet 1   • clopidogrel (PLAVIX) 75 mg tablet Take 1 tablet (75 mg total) by mouth daily. 90 tablet 0   • Lactobacillus acidophilus (PROBIOTIC ORAL) Take 1 capsule by  "mouth daily.     • cyanocobalamin (VITAMIN B12) 1,000 mcg tablet Take 1,000 mcg by mouth daily.       No current facility-administered medications for this visit.        Review of Systems   Constitutional: Positive for fatigue. Negative for chills and fever.   Respiratory: Negative for cough.    Gastrointestinal: Negative for abdominal pain, nausea and vomiting.     Objective   Vitals:    07/29/19 1131   BP: 126/84   BP Location: Left upper arm   Patient Position: Sitting   Pulse: 66   Temp: 36.7 °C (98 °F)   TempSrc: Oral   SpO2: 97%   Weight: 75.3 kg (166 lb)   Height: 1.6 m (5' 3\")       Physical Exam   Constitutional: She appears well-developed and well-nourished. No distress.   HENT:   Right Ear: Tympanic membrane and ear canal normal.   Left Ear: Tympanic membrane and ear canal normal.   Nose: Mucosal edema present. Right sinus exhibits maxillary sinus tenderness (mild). Left sinus exhibits maxillary sinus tenderness (mild).   Mouth/Throat: Posterior oropharyngeal erythema present. No oropharyngeal exudate.   Eyes: Pupils are equal, round, and reactive to light.   Cardiovascular: Normal rate, regular rhythm, S1 normal and S2 normal.    No murmur heard.  Pulmonary/Chest: Effort normal and breath sounds normal. No respiratory distress. She has no wheezes. She has no rhonchi. She has no rales.   Lymphadenopathy:     She has no cervical adenopathy.       Procedures    Lab Results   Component Value Date    WBC 5.8 05/31/2019    HGB 13.1 05/31/2019    HCT 40.0 05/31/2019     05/31/2019    CHOL 259 (H) 05/31/2019    TRIG 78 05/31/2019    HDL 67 05/31/2019    ALT 22 05/31/2019    AST 23 05/31/2019     05/31/2019    K 4.3 05/31/2019     05/31/2019    CREATININE 0.77 05/31/2019    BUN 13 05/31/2019    CO2 23 05/31/2019    TSH 2.750 05/31/2019    INR 0.9 02/07/2019    HGBA1C 5.4 05/31/2019         Assessment   Problem List Items Addressed This Visit     None      Visit Diagnoses     Acute URI    -  " Primary    Suspect viral URI.   Call if no improvement by end of week.   Reviewed supportive care.               Zelda Navarro MD  7/29/2019

## 2019-08-02 ENCOUNTER — OFFICE VISIT (OUTPATIENT)
Dept: CARDIOLOGY | Facility: CLINIC | Age: 49
End: 2019-08-02
Payer: COMMERCIAL

## 2019-08-02 VITALS
WEIGHT: 164 LBS | OXYGEN SATURATION: 96 % | BODY MASS INDEX: 29.06 KG/M2 | SYSTOLIC BLOOD PRESSURE: 124 MMHG | DIASTOLIC BLOOD PRESSURE: 86 MMHG | HEIGHT: 63 IN | HEART RATE: 67 BPM

## 2019-08-02 DIAGNOSIS — I67.1 BRAIN ANEURYSM: ICD-10-CM

## 2019-08-02 DIAGNOSIS — E78.2 MIXED HYPERLIPIDEMIA: Primary | ICD-10-CM

## 2019-08-02 DIAGNOSIS — I63.9 CEREBELLAR INFARCT (CMS/HCC): ICD-10-CM

## 2019-08-02 DIAGNOSIS — Q21.12 PFO (PATENT FORAMEN OVALE): ICD-10-CM

## 2019-08-02 PROBLEM — I77.810 DILATED AORTIC ROOT (CMS/HCC): Status: ACTIVE | Noted: 2019-08-02

## 2019-08-02 PROBLEM — R73.9 HYPERGLYCEMIA: Status: ACTIVE | Noted: 2019-08-02

## 2019-08-02 PROBLEM — I73.9 PVD (PERIPHERAL VASCULAR DISEASE) (CMS/HCC): Status: ACTIVE | Noted: 2019-08-02

## 2019-08-02 PROCEDURE — 93000 ELECTROCARDIOGRAM COMPLETE: CPT | Performed by: INTERNAL MEDICINE

## 2019-08-02 PROCEDURE — 99215 OFFICE O/P EST HI 40 MIN: CPT | Performed by: INTERNAL MEDICINE

## 2019-08-02 NOTE — ASSESSMENT & PLAN NOTE
I met t her November 2018 when she presented with a stroke left cerebellum work-up revealed patent foramen ovale  She underwent successful percutaneous closure November 2018  Last echo in March trace flow across defect suspect will resolve  He has been on dual antiplatelet for 6 months we will continue now on aspirin alone  Encourage antibiotic prophylaxis  She has no known obstructive CAD with coronary calcium score of 0 done in 2015

## 2019-08-02 NOTE — ASSESSMENT & PLAN NOTE
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 CHRISTIAN Pizarro at [place] he will do serial MRIs no intervention at this time

## 2019-08-02 NOTE — LETTER
August 2, 2019     DEIRDRE Ludwig  [address]  [facility] [address]    Patient: Orly Lara  YOB: 1970  Date of Visit: 8/2/2019      Dear Nieves    Thank you for referring Orly Lara to me for evaluation. Below are my notes for this consultation.    If you have questions, please do not hesitate to call me. I look forward to following your patient along with you.         Sincerely,        Luis Dumont MD        CC: MD Simran La, MACIE Barrera, MD Anastasia Haile MD Daniel J Rader, MD Becker, Luis MORAN MD  8/2/2019  4:02 PM  Sign at close encounter  Cardiology Consult/New Patient    Bethany Celestin CRNP          Orly Lara is a 49 y.o. female identifies as who presents with ']]]]]]    I had last seen her November 2018  She returns after PFO repair with Dr. Saldana and placement of an Amplatzer device February  She had a history of a stroke with right cerebellar infarction seen on MRI in 2016  I met her and wanted to work-up etiology such as paroxysmal atrial fibrillation or PFO  Transesophageal echo documented patent foramen ovale  She was evaluated by Dr. 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 Anastasia Pizarro  Who is repeating MRI in November  She follows with neurologist Dr. Sree Wells    Lab work reviewed from May remarkable for significant hyperlipidemia with LDL cholesterol 180 and she does have nonobstructive plaque seen on aortic with transesophageal echocardiogram in the past and CTA of her carotids from November 2018    With documented early arterial sclerosis on transesophageal echo and CT angiogram  I am a big e believer that she needs statins  She felt tired on Lipitor and stopped  She brought me some lab work she was happy that her LDL remnant was high and is trying not to  take statins    The particle number is quite high and her LDL totals of 190  Again I feel strongly that a statin is indicated  I asked her to follow-up with a lipid neurologist Dr. Gamboa, see what he suggests                     Patient Active Problem List    Diagnosis Date Noted   • Hyperglycemia 08/02/2019   • Dilated aortic root (CMS/HCC) ([facility]) 08/02/2019   • PVD (peripheral vascular disease) (CMS/HCC) ([facility]) 08/02/2019   • Cerebellar infarct (CMS/HCC) ([facility]) 05/23/2019   • Brain aneurysm 05/23/2019   • SVT (supraventricular tachycardia) (CMS/HCC) ([facility]) 12/14/2018   • PFO (patent foramen ovale) 11/30/2018   • Mixed hyperlipidemia 11/15/2018   • Stroke (CMS/HCC) ([facility]) 11/08/2018   • Vertigo 10/30/2018   • Vestibular disequilibrium, left 08/05/2016       Medical History:   Past Medical History:   Diagnosis Date   • Hypertension    • Positional vertigo    • Stroke (CMS/HCC) ([facility])        Surgical History:   Past Surgical History:   Procedure Laterality Date   • HYSTEROSCOPY W/ ENDOMETRIAL ABLATION     • PATENT FORAMEN OVALE CLOSURE         Allergies: Iodinated contrast- oral and iv dye and Lipitor [atorvastatin]    Current Outpatient Prescriptions   Medication Sig Dispense Refill   • ascorbic acid (ascorbic acid with jason hips) 500 mg tablet Take 500 mg by mouth daily.     • aspirin 81 mg enteric coated tablet Take 1 tablet (81 mg total) by mouth daily. 90 tablet 1   • cyanocobalamin (VITAMIN B12) 1,000 mcg tablet Take 1,000 mcg by mouth daily.     • Lactobacillus acidophilus (PROBIOTIC ORAL) Take 1 capsule by mouth daily.       No current facility-administered medications for this visit.        Social History:   Social History     Social History   • Marital status:      Spouse name: N/A   • Number of children: N/A   • Years of education: N/A     Social History Main Topics   • Smoking status: Former Smoker   • Smokeless tobacco: Never Used   • Alcohol use Yes      Comment: socially   • Drug use: Unknown   •  Sexual activity: Defer     Other Topics Concern   • None     Social History Narrative    Do you wear your seatbelt? Yes    Do you have smoke detector in your home? Yes    Do you have a carbon monoxide detector in your home? 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    Current Marital Status?            Family History:   Family History   Problem Relation Age of Onset   • Heart attack Mother    • Hyperlipidemia Mother        Review of Systems   Review of Systems   Constitution: Negative for malaise/fatigue, weight gain and weight loss.   HENT: Negative for hearing loss and hoarse voice.    Eyes: Negative for visual disturbance.   Cardiovascular: Negative for chest pain, claudication, cyanosis, dyspnea on exertion, irregular heartbeat, leg swelling, near-syncope, orthopnea, palpitations, paroxysmal nocturnal dyspnea and syncope.   Respiratory: Negative for cough, hemoptysis, shortness of breath, sleep disturbances due to breathing, snoring, sputum production and wheezing.    Endocrine: Negative for cold intolerance and heat intolerance.   Hematologic/Lymphatic: Negative.  Negative for bleeding problem. Does not bruise/bleed easily.   Skin: Negative.  Negative for rash.   Musculoskeletal: Negative for arthritis, falls, joint pain, muscle cramps and myalgias.   Gastrointestinal: Negative for abdominal pain, anorexia, change in bowel habit, constipation, diarrhea, dysphagia, heartburn, jaundice and nausea.   Genitourinary: Negative for frequency and nocturia.   Neurological: Positive for vertigo. Negative for dizziness, focal weakness, headaches, light-headedness, numbness and tremors.   Psychiatric/Behavioral: Negative for memory loss. The patient does not have insomnia and is not nervous/anxious.    Allergic/Immunologic: Negative for hives.       Objective       Vitals:    08/02/19 1514   BP: 124/86   Pulse: 67   SpO2: 96%       Physical Exam   Constitutional: She is oriented to person, place, and  time. She appears well-developed and well-nourished. No distress.   HENT:   Head: Normocephalic and atraumatic.   Nose: Nose normal.   Eyes: Conjunctivae are normal. No scleral icterus.   Neck: No JVD present.   Cardiovascular: Normal rate, regular rhythm, normal heart sounds and intact distal pulses.  Exam reveals no gallop and no friction rub.    No murmur heard.  Pulmonary/Chest: Effort normal. No stridor. No respiratory distress. She has no wheezes. She has no rales. She exhibits no tenderness.   Abdominal: There is no tenderness.   Musculoskeletal: She exhibits no edema or deformity.   Neurological: She is alert and oriented to person, place, and time.   Skin: Skin is warm and dry.   Psychiatric: She has a normal mood and affect.        Labs   Lab Results   Component Value Date    WBC 5.8 05/31/2019    HGB 13.1 05/31/2019    HCT 40.0 05/31/2019     05/31/2019    CHOL 259 (H) 05/31/2019    TRIG 78 05/31/2019    HDL 67 05/31/2019    ALT 22 05/31/2019    AST 23 05/31/2019     05/31/2019    K 4.3 05/31/2019     05/31/2019    CREATININE 0.77 05/31/2019    BUN 13 05/31/2019    CO2 23 05/31/2019    TSH 2.750 05/31/2019    INR 0.9 02/07/2019    HGBA1C 5.4 05/31/2019     LDL CHOL 179 HDL 67    HDL 39  Small   LDL size 22  HDLC 76  LDL C1 93  LDL particle #2100  Total cholesterol 286                    Imaging    CATH/cv surgery  PFO REPAIR 2/19 AMPLATZER DEVICE DR MARROQUIN    CAT SCAN  Cor garett score zero 2015 thymus tissue    \        ECHO  Gavin 11/18 EF 65%  PFO with left-to-right shunt plaque seen on aorta  agitated saline.       1. Normal left ventricular wall thickness and cavity with preserved systolic function. Estimated EF 65%. No regional wall motion abnormalities.   2. Three cusped aortic valve.  Mild aortic regurgitation.  Normal aortic dimensions. Simple atheroma in the visualized portions of the descending aorta without complex elements.   3. Normal mitral valve.  Trace mitral  regurgitation.  Systolic predominance of pulmonary vein flow by spectral doppler.    4. Normal left atrial size. No thrombus or mass seen in the left atrium or left atrial appendage. Left atrial appendage peak emptying velocity 40cm/s.  5. Evidence of small PFO with left to right shunt with agitated saline.       Echo march 2019 aorta 3.9  Ef70% can't exclude smal residual pfo       MRI   mri head 10/18 r cerebellar old infarc  cta brain 11/18 left carotid terminus aneurysm 3 mm, chronic lacunar infarct left cerebellum    cta head 11/18 3mm anudryeleft carotid terminus  Mild plaque L internal caroitd    ELECTROPHYSIOLOGY  12/18 monitor 10 beat svt    ECG normla  sinus rhythm       Assessment/Plan     PFO (patent foramen ovale)  I met t her November 2018 when she presented with a stroke left cerebellum work-up revealed patent foramen ovale  She underwent successful percutaneous closure November 2018  Last echo in March trace flow across defect suspect will resolve  He has been on dual antiplatelet for 6 months we will continue now on aspirin alone  Encourage antibiotic prophylaxis  She has no known obstructive CAD with coronary calcium score of 0 done in 2015    Cerebellar infarct (CMS/HCC) (HCC)  As noted above left cerebellar cTA done November showed minimal plaque  She will continue long-term aspirin and ibuprofen and statin therapy long discussion with her about this  Described below    Brain aneurysm  Incidental 3 mm 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 CHRISTIAN Pizarro at [place] he will do serial MRIs no intervention at this time    Mixed hyperlipidemia  She is a baseline cholesterol 300 LDL of 190 I suspect we are dealing with heterozygous familial hyperlipidemia  She had advanced lipid profile done and she was happy that her LDL size was large  And she is trying to talk me out of suggesting statin therapy  She did not like Lipitor she felt tired  She has documentation of early  arterial sclerosis with transesophageal echo minimal plaque was seen on her aorta and with her carotid CT angiogram done in November  Because of this I am strongly suggested retrying a statin i.e. Crestor  She is reluctant  So the plan is up and have her evaluated by Dr. Orivlle Gamboa at [facility] and see what he suggests    PVD (peripheral vascular disease) (CMS/HCC) (HCC)  Transesophageal echocardiogram done to diagnose a PFO in November evidence of atheroma was seen           Status post PFO occlusion2/19   history of cerebellar stroke doing   we stopped Plavix today continue aspirin    She has minimal plaque seen on other testing including coronary CT angiogram and transesophageal echo plaque seen on her aortic past  She has probable heterozygous familial with baseline LDL cholesterol 190  I would love her to be on statin   she had some mild issues with Lipitor and I would like to his Crestor  She brought me some more detailed lipid values and is trying to argue the case that she does not need statin therapy  We Came up with a plan to have her evaluated by an expert on lipids, Dr Raj Gamboa  And will see what his suggestions are  Other issues were cerebral aneurysm picked up incidentally that is being followed by Dr. Pizarro at [place]    I will see her back in 3 months with resting echocardiogram she was concerned there was trace flow on previous echocardiogram I suspect that will resolve      This letter was generated using speech recognition software.  Please excuse any typographical errors.    This letter was generated using speech recognition software.  Please excuse any typographical errors.    Luis Dumont MD  8/2/2019

## 2019-08-02 NOTE — ASSESSMENT & PLAN NOTE
As noted above left cerebellar cTA done November showed minimal plaque  She will continue long-term aspirin and ibuprofen and statin therapy long discussion with her about this  Described below

## 2019-08-02 NOTE — ASSESSMENT & PLAN NOTE
She is a baseline cholesterol 300 LDL of 190 I suspect we are dealing with heterozygous familial hyperlipidemia  She had advanced lipid profile done and she was happy that her LDL size was large  And she is trying to talk me out of suggesting statin therapy  She did not like Lipitor she felt tired  She has documentation of early arterial sclerosis with transesophageal echo minimal plaque was seen on her aorta and with her carotid CT angiogram done in November  Because of this I am strongly suggested retrying a statin i.e. Crestor  She is reluctant  So the plan is up and have her evaluated by Dr. Orville Gamboa at [facility] and see what he suggests

## 2019-08-05 ENCOUNTER — TELEPHONE (OUTPATIENT)
Dept: CARDIOLOGY | Facility: CLINIC | Age: 49
End: 2019-08-05

## 2019-08-05 NOTE — TELEPHONE ENCOUNTER
With reference to note below, I spoke to Dr. Dumont, pt can see Dr. Brito.  Pt has ov appt with Dr. Brito 08-16-19, [facility].  Elevated LDL level.    ----- Message from Orly Lara sent at 8/5/2019  5:08 PM EDT -----  Regarding: Non-Urgent Medical Question  Contact: [phone]  [facility] Dr. Dumnot,     I was in last week and you wanted me to follow-up with a cholesterol specialist, Raj Sands.  He only works out of the city at [place], no satellite offices.  I'm wondering if I could follow-up with the specialist that Nieves recommended, Dr. Ag Brito from [facility] instead.  Please let me know your thoughts.      Thank You,   Orly Lara

## 2019-08-14 NOTE — PROGRESS NOTES
Advanced lipid clinic      2019  MD Orly Ching Seaner  1970    Dear Providers;    It was my pleasure to see your patient in the advanced lipid clinic today.  As you know she was referred for evaluation and management of hyperlipidemia.    Her most recent lipid panel 2019 showed a total cholesterol of 259, HDL 67, , and triglycerides 78.  A [facility] advanced lipid panel was performed in 2019 revealed a total cholesterol 276  HDL 73 triglycerides 79 LDL P 2120.  Certainly these lipid panel suggest polygenic if not familial hypercholesterolemia.    Patient carries a family history of hypercholesterolemia.  Both her father and mother have hypercholesterolemia.  Her father  of microinfarction age 56.  Her mother had a myocardial infarction in her 60s.    During her work-up for cryptogenic embolic CVA to the cerebellum patient underwent transesophageal echo.  This demonstrated a PFO as well as descending aorta atheromata.  She underwent Amplatzer PFO closure in 2019.  Certainly it appears that she is developing atherosclerosis in her descending aorta.    The patient has bilateral corneal arcus.  This was noted by her ophthalmologist several years ago which prompted her to get her first lipid panel.  Certainly this physical finding as well as her family history and early aortic atherosclerosis is all consistent with familial hypercholesterolemia.    At this time recommending that she undergo an advance of panel as well as a repeat coronary calcium score.  We will review the studies in 4 to 6 weeks and then recommend therapy.  She has been on statins in the past including Lipitor and Crestor.  Both of these medications resulted in profound fatigue and myalgias.  We may consider a rechallenge prior to recommending PCSK9 therapy.  We will also determine whether she is a hyper absorber of cholesterol and would benefit from the use  [facility].      Past medical history:  · Polygenic Hypercholesterolemia    · Arterial sclerosis  · Hypertension  · Hyperglycemia  · Anemia  · SVT  · Dilated aortic root  · Cerebellar infarct  · Brain aneurysm  · Vestibular disequilibrium, left  · Vertigo  · PFO with percutaneous repair 2019 by Dr Saldana  · Peripheral vascular disease    Past surgical history:  · Amplatzer PFO occluder    Current medications:  Current Outpatient Prescriptions:   •  ascorbic acid (ascorbic acid with jason hips) 500 mg tablet, Take 500 mg by mouth daily., Disp: , Rfl:   •  aspirin 81 mg enteric coated tablet, Take 1 tablet (81 mg total) by mouth daily., Disp: 90 tablet, Rfl: 1  •  cyanocobalamin (VITAMIN B12) 1,000 mcg tablet, Take 1,000 mcg by mouth daily., Disp: , Rfl:   •  Lactobacillus acidophilus (PROBIOTIC ORAL), Take 1 capsule by mouth daily., Disp: , Rfl:     Supplements:  Probiotics, Vitamin B-12,     Allergies:  Iodine contrast-oral and IV dye  Lipitor, Crestot    Family history:  Mother- CAD, myocardial infarction at age 67, hyperlipidemia  Father -  age 56 MI, Hyperlipidemia    Social history:  Orly is  her 's name is Blake.  She is a former smoker, she drinks occasional alcohol    Review of systems:  Patient denies any recent chest discomfort or shortness of breath, he has had no palpitations, dizziness, syncope or near syncope.      Physical examination:  Orly is a 49 year-old female in no acute distress.  Vital signs:temperture afebrile, heart rate 68, resp rate 14.  Blood pressure:120/90  Weight:163  HEENT: Bilateral corneal arcus.  Neck: Supple, no JVD, carotids have normal upstroke without bruit.  Heart: Regular rate and rhythm, no murmur or gallop.  Lungs: Clear to auscultation and percussion bilaterally.  Abdomen: Soft and nontender, no organomegaly, positive bowel sounds.  Extremities: No edema pulses intact.  No tendons xanthomata or skin xanthelasma  Neuro: Stable    Laboratory data:    EKG:  Sinus Bradycardia  Coronary calcium score 2015: 0  Lipid panel: 5/31/2019 showed a total cholesterol of 259, HDL 67, , and triglycerides 78.   [facility] advanced lipid panel June 2019: Total cholesterol 276 LDL C1 93 HDL 73 triglycerides 79 LDL P 2120      Impressions and recommendations:    1.  Cardiovascular risk assessment: The patient will undergo a repeat coronary calcium score at this time.  2.  Peripheral vascular disease.  The patient has evidence of atheroma involving her descending thoracic aorta.  3.  Familial hypercholesterolemia.  Patient is meeting criteria for familial hypercholesterolemia.  Her LDL cholesterol is over 190, there is strong family history of premature coronary disease and hypercholesterolemia and she has bilateral corneal arcus.  She has evidence of premature peripheral vascular disease.  We will obtain the results of her coronary calcium score and advanced to the panel to discuss therapeutic options.  4. Family history of premature CAD. Mother and Father both had CAD and MI at early ages.  5.  Bilateral corneal arcus.  6.  Statin intolerance.  7.  Cryptogenic cerebellar stroke.  Patient had closure of a PFO with an Amplatzer device.    Summary: We will see Orly back in the office in 4 to 6 weeks with an advanced lipid panel and coronary calcium score.  Certainly she meets criteria for familial hypercholesterolemia.  She does have a history of statin intolerance and we may rechallenge her with intermittent dosing statins.  If suspect we will not be able to reach her goal LDL cholesterol less than 100.  I suspect we will be recommending PCSK9 therapy in the near future.    This was a 45-minute patient encounter granted percent times of the care coronation counseling.    Sincerely,    Ag Brito MD    8/16/2019

## 2019-08-16 ENCOUNTER — OFFICE VISIT (OUTPATIENT)
Dept: CARDIOLOGY | Facility: CLINIC | Age: 49
End: 2019-08-16
Payer: COMMERCIAL

## 2019-08-16 ENCOUNTER — TELEPHONE (OUTPATIENT)
Dept: SCHEDULING | Facility: CLINIC | Age: 49
End: 2019-08-16

## 2019-08-16 VITALS
BODY MASS INDEX: 28.88 KG/M2 | SYSTOLIC BLOOD PRESSURE: 120 MMHG | HEIGHT: 63 IN | WEIGHT: 163 LBS | HEART RATE: 68 BPM | DIASTOLIC BLOOD PRESSURE: 90 MMHG

## 2019-08-16 DIAGNOSIS — E78.2 MIXED HYPERLIPIDEMIA: Primary | ICD-10-CM

## 2019-08-16 DIAGNOSIS — E78.00 POLYGENIC HYPERCHOLESTEROLEMIA: ICD-10-CM

## 2019-08-16 DIAGNOSIS — Q21.12 PFO (PATENT FORAMEN OVALE): ICD-10-CM

## 2019-08-16 DIAGNOSIS — I47.10 SVT (SUPRAVENTRICULAR TACHYCARDIA) (CMS/HCC): ICD-10-CM

## 2019-08-16 PROCEDURE — 99215 OFFICE O/P EST HI 40 MIN: CPT | Performed by: INTERNAL MEDICINE

## 2019-08-16 NOTE — LETTER
2019     DEIRDRE Ludwig  [address] [facility] [address]  [facility] [address]    Patient: Orly Lara  YOB: 1970  Date of Visit: 2019      Dear Dr. Celestin:    Thank you for referring Orly Lara to me for evaluation. Below are my notes for this consultation.    If you have questions, please do not hesitate to call me. I look forward to following your patient along with you.         Sincerely,        Ag Brito MD        CC: No Recipients  Ag Brito MD  2019 11:52 AM  Sign at close encounter      Advanced lipid clinic      2019  Bethany Dumont MD    Jane Villalba  1970    Dear Providers;    It was my pleasure to see your patient in the advanced lipid clinic today.  As you know she was referred for evaluation and management of hyperlipidemia.    Her most recent lipid panel 2019 showed a total cholesterol of 259, HDL 67, , and triglycerides 78.  A [facility] advanced lipid panel was performed in 2019 revealed a total cholesterol 276  HDL 73 triglycerides 79 LDL P 2120.  Certainly these lipid panel suggest polygenic if not familial hypercholesterolemia.    Patient carries a family history of hypercholesterolemia.  Both her father and mother have hypercholesterolemia.  Her father  of microinfarction age 56.  Her mother had a myocardial infarction in her 60s.    During her work-up for cryptogenic embolic CVA to the cerebellum patient underwent transesophageal echo.  This demonstrated a PFO as well as descending aorta atheromata.  She underwent Amplatzer PFO closure in 2019.  Certainly it appears that she is developing atherosclerosis in her descending aorta.    The patient has bilateral corneal arcus.  This was noted by her ophthalmologist several years ago which prompted her to get her first lipid panel.  Certainly this physical finding as well as her family history  and early aortic atherosclerosis is all consistent with familial hypercholesterolemia.    At this time recommending that she undergo an advance of panel as well as a repeat coronary calcium score.  We will review the studies in 4 to 6 weeks and then recommend therapy.  She has been on statins in the past including Lipitor and Crestor.  Both of these medications resulted in profound fatigue and myalgias.  We may consider a rechallenge prior to recommending PCSK9 therapy.  We will also determine whether she is a hyper absorber of cholesterol and would benefit from the use of Zetia.      Past medical history:  · Polygenic Hypercholesterolemia    · Arterial sclerosis  · Hypertension  · Hyperglycemia  · Anemia  · SVT  · Dilated aortic root  · Cerebellar infarct  · Brain aneurysm  · Vestibular disequilibrium, left  · Vertigo  · PFO with percutaneous repair 2019 by Dr Saldana  · Peripheral vascular disease    Past surgical history:  · Amplatzer PFO occluder    Current medications:  Current Outpatient Prescriptions:   •  ascorbic acid (ascorbic acid with jason hips) 500 mg tablet, Take 500 mg by mouth daily., Disp: , Rfl:   •  aspirin 81 mg enteric coated tablet, Take 1 tablet (81 mg total) by mouth daily., Disp: 90 tablet, Rfl: 1  •  cyanocobalamin (VITAMIN B12) 1,000 mcg tablet, Take 1,000 mcg by mouth daily., Disp: , Rfl:   •  Lactobacillus acidophilus (PROBIOTIC ORAL), Take 1 capsule by mouth daily., Disp: , Rfl:     Supplements:  Probiotics, Vitamin B-12,     Allergies:  Iodine contrast-oral and IV dye  Lipitor, Crestot    Family history:  Mother- CAD, myocardial infarction at age 67, hyperlipidemia  Father -  age 56 MI, Hyperlipidemia    Social history:  Orly is  her 's name is Blake.  She is a former smoker, she drinks occasional alcohol    Review of systems:  Patient denies any recent chest discomfort or shortness of breath, he has had no palpitations, dizziness, syncope or near syncope.       Physical examination:  Orly is a 49 year-old female in no acute distress.  Vital signs:temperture afebrile, heart rate 68, resp rate 14.  Blood pressure:120/90  Weight:163  HEENT: Bilateral corneal arcus.  Neck: Supple, no JVD, carotids have normal upstroke without bruit.  Heart: Regular rate and rhythm, no murmur or gallop.  Lungs: Clear to auscultation and percussion bilaterally.  Abdomen: Soft and nontender, no organomegaly, positive bowel sounds.  Extremities: No edema pulses intact.  No tendons xanthomata or skin xanthelasma  Neuro: Stable    Laboratory data:    EKG: Sinus Bradycardia  Coronary calcium score 2015: 0  Lipid panel: 5/31/2019 showed a total cholesterol of 259, HDL 67, , and triglycerides 78.   [facility] advanced lipid panel June 2019: Total cholesterol 276 LDL C1 93 HDL 73 triglycerides 79 LDL P 2120      Impressions and recommendations:    1.  Cardiovascular risk assessment: The patient will undergo a repeat coronary calcium score at this time.  2.  Peripheral vascular disease.  The patient has evidence of atheroma involving her descending thoracic aorta.  3.  Familial hypercholesterolemia.  Patient is meeting criteria for familial hypercholesterolemia.  Her LDL cholesterol is over 190, there is strong family history of premature coronary disease and hypercholesterolemia and she has bilateral corneal arcus.  She has evidence of premature peripheral vascular disease.  We will obtain the results of her coronary calcium score and advanced to the panel to discuss therapeutic options.  4. Family history of premature CAD. Mother and Father both had CAD and MI at early ages.  5.  Bilateral corneal arcus.  6.  Statin intolerance.  7.  Cryptogenic cerebellar stroke.  Patient had closure of a PFO with an Amplatzer device.    Summary: We will see Orly back in the office in 4 to 6 weeks with an advanced lipid panel and coronary calcium score.  Certainly she meets criteria for familial  hypercholesterolemia.  She does have a history of statin intolerance and we may rechallenge her with intermittent dosing statins.  If suspect we will not be able to reach her goal LDL cholesterol less than 100.  I suspect we will be recommending PCSK9 therapy in the near future.    This was a 45-minute patient encounter granted percent times of the care coronation counseling.    Sincerely,    Ag Brito MD    8/16/2019

## 2019-08-16 NOTE — TELEPHONE ENCOUNTER
Return patient call.  No answer.  Left detailed message explaining antibiotic prophylaxis is needed for 6 months post.  She is approaching that timeframe, I recommended she schedule her appointment for after that 6-month duration.  This is not a lifelong requirement as I also mentioned.

## 2019-08-16 NOTE — TELEPHONE ENCOUNTER
Orly called to confirm that Dr Saldana wanted her to take antibiotics in prep for a dentist appt, she is requesting a call back to clarify his instructions   No dental appt has been made yet, it would be for a cleaning and possible filling.   She can be reached@ [phone]

## 2019-08-19 ENCOUNTER — HOSPITAL ENCOUNTER (OUTPATIENT)
Dept: RADIOLOGY | Facility: CLINIC | Age: 49
Discharge: HOME | End: 2019-08-19
Attending: INTERNAL MEDICINE

## 2019-08-19 DIAGNOSIS — E78.2 MIXED HYPERLIPIDEMIA: ICD-10-CM

## 2019-08-19 PROCEDURE — 75571 CT HRT W/O DYE W/CA TEST: CPT

## 2019-09-26 NOTE — PROGRESS NOTES
Advanced lipid clinic      2019  MD Orly Ching Seaner  1970    Dear Providers;    It was my pleasure to see your patient in the advanced lipid clinic today.  As you know she was referred for evaluation and management of hyperlipidemia.    Her most recent lipid panel 2019, showed a total cholesterol 262, , HDL 74, triglycerides 65.  A [facility] advanced lipid panel was performed in 2019 revealed a total cholesterol 276  HDL 73 triglycerides 79 LDL P 2120.  Certainly these lipid panel suggest polygenic if not familial hypercholesterolemia.    Patient carries a family history of hypercholesterolemia.  Both her father and mother have hypercholesterolemia.  Her father  of microinfarction age 56.  Her mother had a myocardial infarction in her 60s.    During her work-up for cryptogenic embolic CVA to the cerebellum patient underwent transesophageal echo.  This demonstrated a PFO as well as descending aorta atheromata.  She underwent Amplatzer PFO closure in 2019.  Certainly it appears that she is developing atherosclerosis in her descending aorta.    The patient has bilateral corneal arcus.  This was noted by her ophthalmologist several years ago which prompted her to get her first lipid panel.  Certainly this physical finding as well as her family history and early aortic atherosclerosis is all consistent with familial hypercholesterolemia.    She under an advance of panel as well as a repeat coronary calcium score.  Her repeat coronary calcium score was again 0.  We will continue with lifestyle management.    Past medical history:  · Polygenic Hypercholesterolemia    · Arterial sclerosis  · Hypertension  · Hyperglycemia  · Anemia  · SVT  · Dilated aortic root  · Cerebellar infarct  · Brain aneurysm  · Vestibular disequilibrium, left  · Vertigo  · PFO with percutaneous repair 2019 by Dr Saldana  · Peripheral vascular  disease    Past surgical history:  · Amplatzer PFO occluder    Current medications:  Current Outpatient Prescriptions:   •  ascorbic acid (ascorbic acid with jason hips) 500 mg tablet, Take 500 mg by mouth daily., Disp: , Rfl:   •  aspirin 81 mg enteric coated tablet, Take 1 tablet (81 mg total) by mouth daily., Disp: 90 tablet, Rfl: 1  •  cyanocobalamin (VITAMIN B12) 1,000 mcg tablet, Take 1,000 mcg by mouth daily., Disp: , Rfl:   •  Lactobacillus acidophilus (PROBIOTIC ORAL), Take 1 capsule by mouth daily., Disp: , Rfl:     Supplements:  Probiotics, Vitamin B-12,     Allergies:  Iodine contrast-oral and IV dye  Lipitor, Crestot    Family history:  Mother- CAD, myocardial infarction at age 67, hyperlipidemia, stroke  Father -  age 56 MI, Hyperlipidemia    Social history:  Orly is  her 's name is Blake.  She is a former smoker, she drinks occasional alcohol    Review of systems:  Patient denies any recent chest discomfort or shortness of breath, he has had no palpitations, dizziness, syncope or near syncope.      Physical examination:  Orly is a 49 year-old female in no acute distress.  Vital signs:temperture afebrile, heart rate 68, resp rate 14.  Blood pressure:126/76  Weight:160  HEENT: Bilateral corneal arcus.  Neck: Supple, no JVD, carotids have normal upstroke without bruit.  Heart: Regular rate and rhythm, no murmur or gallop.  Lungs: Clear to auscultation and percussion bilaterally.  Abdomen: Soft and nontender, no organomegaly, positive bowel sounds.  Extremities: No edema pulses intact.  No tendons xanthomata or skin xanthelasma  Neuro: Stable    Laboratory data:    EKG: Sinus Bradycardia  Coronary calcium score 2015: 0, 2019- 0  Lipid panel: 2019 showed a total cholesterol of 259, HDL 67, , and triglycerides 78.   [facility] advanced lipid panel 2019: Total cholesterol 276 LDL C1 93 HDL 73 triglycerides 79 LDL P 2120      Impressions and recommendations:    1.   Cardiovascular risk assessment: The patient's repeat coronary calcium score was 0.  Repeat this in 5 years.  2.  Peripheral vascular disease.  The patient has evidence of atheroma involving her descending thoracic aorta.  3.  Familial hypercholesterolemia.  Patient is meeting criteria for familial hypercholesterolemia.  Her LDL cholesterol is over 190, there is strong family history of premature coronary disease and hypercholesterolemia and she has bilateral corneal arcus.  She has evidence of premature peripheral vascular disease.  We will obtain the results of her coronary calcium score and advanced to the panel to discuss therapeutic options.  4. Family history of premature CAD. Mother and Father both had CAD and MI at early ages.  5.  Bilateral corneal arcus.  6.  Statin intolerance.  7.  Cryptogenic cerebellar stroke.  Patient had closure of a PFO with an Amplatzer device.    Summary: We will repeat her Coronary calcium score in 5 years.      This was a 30- minute patient encounter granted percent times of the care coronation counseling.    Sincerely,    Ag Brito MD

## 2019-09-27 ENCOUNTER — OFFICE VISIT (OUTPATIENT)
Dept: CARDIOLOGY | Facility: CLINIC | Age: 49
End: 2019-09-27
Payer: COMMERCIAL

## 2019-09-27 VITALS
HEART RATE: 70 BPM | HEIGHT: 63 IN | WEIGHT: 160 LBS | DIASTOLIC BLOOD PRESSURE: 76 MMHG | BODY MASS INDEX: 28.35 KG/M2 | SYSTOLIC BLOOD PRESSURE: 126 MMHG

## 2019-09-27 DIAGNOSIS — E78.00 POLYGENIC HYPERCHOLESTEROLEMIA: Primary | ICD-10-CM

## 2019-09-27 PROCEDURE — 99214 OFFICE O/P EST MOD 30 MIN: CPT | Performed by: NURSE PRACTITIONER

## 2019-10-10 ENCOUNTER — TELEPHONE (OUTPATIENT)
Dept: SCHEDULING | Facility: CLINIC | Age: 49
End: 2019-10-10

## 2019-10-11 NOTE — TELEPHONE ENCOUNTER
Spoke with patient.  Amplatzer device is indeed MRA compatible, however there are parameters provided by the .  This information was emailed directly to the patient and she confirmed receipt.  She has no other questions or concerns at this time.

## 2019-10-31 ENCOUNTER — TELEPHONE (OUTPATIENT)
Dept: FAMILY MEDICINE | Facility: CLINIC | Age: 49
End: 2019-10-31

## 2019-10-31 RX ORDER — VALACYCLOVIR HYDROCHLORIDE 1 G/1
TABLET, FILM COATED ORAL
Qty: 30 TABLET | Refills: 2 | Status: SHIPPED | OUTPATIENT
Start: 2019-10-31 | End: 2019-10-31 | Stop reason: SDUPTHER

## 2019-10-31 RX ORDER — VALACYCLOVIR HYDROCHLORIDE 1 G/1
TABLET, FILM COATED ORAL
Qty: 30 TABLET | Refills: 2 | Status: SHIPPED | OUTPATIENT
Start: 2019-10-31 | End: 2021-03-30

## 2019-12-05 ENCOUNTER — TELEPHONE (OUTPATIENT)
Dept: CARDIOLOGY | Facility: CLINIC | Age: 49
End: 2019-12-05

## 2019-12-05 NOTE — TELEPHONE ENCOUNTER
Echo- [facility] -- 12/16/19

## 2019-12-11 LAB
ALBUMIN SERPL-MCNC: 4.4 G/DL (ref 3.5–5.5)
ALBUMIN/GLOB SERPL: 2.1 {RATIO} (ref 1.2–2.2)
ALP SERPL-CCNC: 39 IU/L (ref 39–117)
ALT SERPL-CCNC: 17 IU/L (ref 0–32)
AST SERPL-CCNC: 20 IU/L (ref 0–40)
BASOPHILS # BLD AUTO: 0 X10E3/UL (ref 0–0.2)
BASOPHILS NFR BLD AUTO: 1 %
BILIRUB SERPL-MCNC: 0.4 MG/DL (ref 0–1.2)
BUN SERPL-MCNC: 12 MG/DL (ref 6–24)
BUN/CREAT SERPL: 15 (ref 9–23)
CALCIUM SERPL-MCNC: 9.5 MG/DL (ref 8.7–10.2)
CHLORIDE SERPL-SCNC: 103 MMOL/L (ref 96–106)
CHOLEST SERPL-MCNC: 285 MG/DL (ref 100–199)
CO2 SERPL-SCNC: 21 MMOL/L (ref 20–29)
CREAT SERPL-MCNC: 0.8 MG/DL (ref 0.57–1)
EOSINOPHIL # BLD AUTO: 0.2 X10E3/UL (ref 0–0.4)
EOSINOPHIL NFR BLD AUTO: 3 %
ERYTHROCYTE [DISTWIDTH] IN BLOOD BY AUTOMATED COUNT: 14.1 % (ref 12.3–15.4)
GLOBULIN SER CALC-MCNC: 2.1 G/DL (ref 1.5–4.5)
GLUCOSE SERPL-MCNC: 109 MG/DL (ref 65–99)
HCT VFR BLD AUTO: 39.8 % (ref 34–46.6)
HDLC SERPL-MCNC: 83 MG/DL
HGB BLD-MCNC: 12.9 G/DL (ref 11.1–15.9)
IMM GRANULOCYTES # BLD AUTO: 0 X10E3/UL (ref 0–0.1)
IMM GRANULOCYTES NFR BLD AUTO: 0 %
LAB CORP EGFR IF AFRICN AM: 102 ML/MIN/1.73
LAB CORP EGFR IF NONAFRICN AM: 88 ML/MIN/1.73
LDLC SERPL CALC-MCNC: 190 MG/DL (ref 0–99)
LYMPHOCYTES # BLD AUTO: 2.6 X10E3/UL (ref 0.7–3.1)
LYMPHOCYTES NFR BLD AUTO: 44 %
MCH RBC QN AUTO: 29.9 PG (ref 26.6–33)
MCHC RBC AUTO-ENTMCNC: 32.4 G/DL (ref 31.5–35.7)
MCV RBC AUTO: 92 FL (ref 79–97)
MONOCYTES # BLD AUTO: 0.4 X10E3/UL (ref 0.1–0.9)
MONOCYTES NFR BLD AUTO: 7 %
MORPHOLOGY BLD-IMP: NORMAL
NEUTROPHILS # BLD AUTO: 2.7 X10E3/UL (ref 1.4–7)
NEUTROPHILS NFR BLD AUTO: 45 %
PLATELET # BLD AUTO: NORMAL X10E3/UL
POTASSIUM SERPL-SCNC: 4.6 MMOL/L (ref 3.5–5.2)
PROT SERPL-MCNC: 6.5 G/DL (ref 6–8.5)
RBC # BLD AUTO: 4.31 X10E6/UL (ref 3.77–5.28)
SODIUM SERPL-SCNC: 138 MMOL/L (ref 134–144)
TRIGL SERPL-MCNC: 61 MG/DL (ref 0–149)
VLDLC SERPL CALC-MCNC: 12 MG/DL (ref 5–40)
WBC # BLD AUTO: 5.9 X10E3/UL (ref 3.4–10.8)

## 2019-12-11 ASSESSMENT — ENCOUNTER SYMPTOMS
CHANGE IN BOWEL HABIT: 0
WEIGHT LOSS: 0
NAUSEA: 0
CLAUDICATION: 0
NERVOUS/ANXIOUS: 0
HOARSE VOICE: 0
MYALGIAS: 0
BRUISES/BLEEDS EASILY: 0
HEMOPTYSIS: 0
MUSCLE CRAMPS: 0
SNORING: 0
INSOMNIA: 0
ORTHOPNEA: 0
SHORTNESS OF BREATH: 0
TREMORS: 0
FALLS: 0
CONSTIPATION: 0
HEARTBURN: 0
DIZZINESS: 0
MEMORY LOSS: 0
DIARRHEA: 0
WEIGHT GAIN: 0
NUMBNESS: 0
IRREGULAR HEARTBEAT: 0
FREQUENCY: 0
PALPITATIONS: 0
NEAR-SYNCOPE: 0
FOCAL WEAKNESS: 0
DYSPNEA ON EXERTION: 0
SPUTUM PRODUCTION: 0
WHEEZING: 0
HEMATOLOGIC/LYMPHATIC NEGATIVE: 1
ANOREXIA: 0
ABDOMINAL PAIN: 0
SYNCOPE: 0
COUGH: 0
VERTIGO: 1
LIGHT-HEADEDNESS: 0
PND: 0
JAUNDICE: 0
SLEEP DISTURBANCES DUE TO BREATHING: 0
HEADACHES: 0

## 2019-12-11 NOTE — PROGRESS NOTES
"Cardiology Consult/New Patient    Bethany Celestin CRNP          Orly Lara is a 49 y.o. female identifies as who presents with ']]]]]]  She returns for cardiac follow-up echocardiogram images personally viewed  She presented November 2018 with left cerebellar stroke work-up revealed patent foramen ovale and she had accessible percutaneous closure November 2018 with Amplatz device  Remains on aspirin  She has no known obstructive CAD with a calcium score of 0 in 2015  She also has an incidental 3 mm aneurysms seen in the left intracerebral carotid vessels and she is followed by  at [facility]    She is heterozygous for familial hyperlipidemia     She had had an advanced lipid profile etc. her LDL was buoyant and she did not feel she needed medication  I am concerned because minimal plaque was seen in the transthoracic echocardiogram of her aorta and strong family history of premature cardiac disease in her family  She had coronary calcium score in August of 0  She had been on Lipitor in the past but she was \"tired on it  She is now being evaluated in advance of the clinic with Dr. Brito  He reviewed all her data and felt at this time with a calcium score of 0 premenopausal and increased LDL receptors we could postpone treatment for hyperlipidemia and we will reassess in 5 years      Electronically signed by Luis Dumont MD at 8/2/2019  4:04 PM       Office Visit on 8/2/2019                             Patient Active Problem List    Diagnosis Date Noted   • Polygenic hypercholesterolemia 08/16/2019   • Hyperglycemia 08/02/2019   • Dilated aortic root (CMS/[facility]) 08/02/2019   • PVD (peripheral vascular disease) (CMS/[facility]) 08/02/2019   • Cerebellar infarct (CMS/[facility]) 05/23/2019   • Brain aneurysm 05/23/2019   • SVT (supraventricular tachycardia) (CMS/[facility]) 12/14/2018   • PFO (patent foramen ovale) 11/30/2018   • Stroke (CMS/[facility]) 11/08/2018   • Vertigo 10/30/2018   • Vestibular " disequilibrium, left 2016       Medical History:   Past Medical History:   Diagnosis Date   • Hypertension    • Lipid disorder    • Positional vertigo    • Stroke (CMS/HCC)        Surgical History:   Past Surgical History:   Procedure Laterality Date   • HYSTEROSCOPY W/ ENDOMETRIAL ABLATION     • PATENT FORAMEN OVALE CLOSURE         Allergies: Crestor [rosuvastatin]; Iodinated contrast media; and Lipitor [atorvastatin]    Current Outpatient Medications   Medication Sig Dispense Refill   • ascorbic acid (ascorbic acid with jason hips) 500 mg tablet Take 500 mg by mouth daily.     • aspirin 81 mg enteric coated tablet Take 1 tablet (81 mg total) by mouth daily. 90 tablet 1   • Lactobacillus acidophilus (PROBIOTIC ORAL) Take 1 capsule by mouth daily.     • MAGNESIUM ORAL Take 30 mg by mouth daily.     • valACYclovir (VALTREX) 1 gram tablet Take two tablets every 12 hours for one day, as needed for outbreak 30 tablet 2   • calcium carb/mag carb/folic ac (MAGNEBIND 400 ORAL) Take by mouth. 500 mg two tablets daily       No current facility-administered medications for this visit.        Social History:   Social History     Socioeconomic History   • Marital status:      Spouse name: None   • Number of children: None   • Years of education: None   • Highest education level: None   Occupational History   • None   Social Needs   • Financial resource strain: None   • Food insecurity:     Worry: None     Inability: None   • Transportation needs:     Medical: None     Non-medical: None   Tobacco Use   • Smoking status: Former Smoker     Last attempt to quit: 2006     Years since quittin.9   • Smokeless tobacco: Never Used   Substance and Sexual Activity   • Alcohol use: Yes     Comment: socially   • Drug use: Defer   • Sexual activity: Defer   Lifestyle   • Physical activity:     Days per week: None     Minutes per session: None   • Stress: None   Relationships   • Social connections:     Talks on phone: None      Gets together: None     Attends Buddhist service: None     Active member of club or organization: None     Attends meetings of clubs or organizations: None     Relationship status: None   • Intimate partner violence:     Fear of current or ex partner: None     Emotionally abused: None     Physically abused: None     Forced sexual activity: None   Other Topics Concern   • None   Social History Narrative    Do you wear your seatbelt? Yes    Do you have smoke detector in your home? Yes    Do you have a carbon monoxide detector in your home? 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    Current Marital Status?        Family History:   Family History   Problem Relation Age of Onset   • Heart attack Biological Mother    • Hyperlipidemia Biological Mother        Review of Systems   Review of Systems   Constitution: Negative for malaise/fatigue, weight gain and weight loss.   HENT: Negative for hearing loss and hoarse voice.    Eyes: Negative for visual disturbance.   Cardiovascular: Negative for chest pain, claudication, cyanosis, dyspnea on exertion, irregular heartbeat, leg swelling, near-syncope, orthopnea, palpitations, paroxysmal nocturnal dyspnea and syncope.   Respiratory: Negative for cough, hemoptysis, shortness of breath, sleep disturbances due to breathing, snoring, sputum production and wheezing.    Endocrine: Negative for cold intolerance and heat intolerance.   Hematologic/Lymphatic: Negative.  Negative for bleeding problem. Does not bruise/bleed easily.   Skin: Negative.  Negative for rash.   Musculoskeletal: Negative for arthritis, falls, joint pain, muscle cramps and myalgias.   Gastrointestinal: Negative for abdominal pain, anorexia, change in bowel habit, constipation, diarrhea, dysphagia, heartburn, jaundice and nausea.   Genitourinary: Negative for frequency and nocturia.   Neurological: Positive for vertigo. Negative for dizziness, focal weakness, headaches, light-headedness,  numbness and tremors.   Psychiatric/Behavioral: Negative for memory loss. The patient does not have insomnia and is not nervous/anxious.    Allergic/Immunologic: Negative for hives.       Objective       Vitals:    12/16/19 1527   BP: 100/72   Pulse: 61   SpO2: 98%       Physical Exam   Constitutional: She is oriented to person, place, and time. She appears well-developed and well-nourished. No distress.   HENT:   Head: Normocephalic and atraumatic.   Nose: Nose normal.   Eyes: Conjunctivae are normal. No scleral icterus.   Neck: No JVD present.   Cardiovascular: Normal rate, regular rhythm, normal heart sounds and intact distal pulses. Exam reveals no gallop and no friction rub.   No murmur heard.  Pulmonary/Chest: Effort normal. No stridor. No respiratory distress. She has no wheezes. She has no rales. She exhibits no tenderness.   Abdominal: There is no tenderness.   Musculoskeletal: She exhibits no edema or deformity.   Neurological: She is alert and oriented to person, place, and time.   Skin: Skin is warm and dry.   Psychiatric: She has a normal mood and affect.        Labs   Lab Results   Component Value Date    WBC 5.9 12/10/2019    HGB 12.9 12/10/2019    HCT 39.8 12/10/2019    PLT CANCELED 12/10/2019    CHOL 285 (H) 12/10/2019    TRIG 61 12/10/2019    HDL 83 12/10/2019    ALT 17 12/10/2019    AST 20 12/10/2019     12/10/2019    K 4.6 12/10/2019     12/10/2019    CREATININE 0.80 12/10/2019    BUN 12 12/10/2019    CO2 21 12/10/2019    TSH 2.750 05/31/2019    INR 0.9 02/07/2019    HGBA1C 5.4 05/31/2019     LDL CHOL 179 HDL 67    HDL 39  Small   LDL size 22  HDLC 76  LDL C1 93  LDL particle #2100  Total cholesterol 286                    Imaging    ECHO  Gavin MILD PLAQUE AORTA 11/18 PFO    ECHO 12/19 TTE AMPLATZER DEVICE NO FLOW NL EF        CATH/cv surgery  PFO REPAIR 2/19 AMPLATZER DEVICE DR MARROQUIN    CAT SCAN  Cor garett score zero 2015 thymus tissue    Coronary calcium score August 2019  0    \        ECHO  Gavin 11/18 EF 65%  PFO with left-to-right shunt plaque seen on aorta  agitated saline.       1. Normal left ventricular wall thickness and cavity with preserved systolic function. Estimated EF 65%. No regional wall motion abnormalities.   2. Three cusped aortic valve.  Mild aortic regurgitation.  Normal aortic dimensions. Simple atheroma in the visualized portions of the descending aorta without complex elements.   3. Normal mitral valve.  Trace mitral regurgitation.  Systolic predominance of pulmonary vein flow by spectral doppler.    4. Normal left atrial size. No thrombus or mass seen in the left atrium or left atrial appendage. Left atrial appendage peak emptying velocity 40cm/s.  5. Evidence of small PFO with left to right shunt with agitated saline.       Echo march 2019 aorta 3.9  Ef70% can't exclude smal residual pfo       MRI   mri head 10/18 r cerebellar old infarc  cta brain 11/18 left carotid terminus aneurysm 3 mm, chronic lacunar infarct left cerebellum    cta head 11/18 3mm anudryeleft carotid terminus  Mild plaque L internal caroitd    mra 12/19 no change 3.2 ic aneurysm    ELECTROPHYSIOLOGY  12/18 monitor 10 beat svt    ECG normla  sinus rhythm normal sinus rhythm nonspecific anterior T wave inversions no change       Assessment/Plan     PFO (patent foramen ovale)  She had PFO closure with Amplatzer device echo today stable normal right-sided chambers no sign of flow normal LV systolic function she presented with a cryptogenic cerebellar stroke    Polygenic hypercholesterolemia  HER LDL cholesterol levels of 190 had detailed profile follows with Dr. Ellis  Concerned because she did have some atheroma seen on her thoracic aorta with GAVIN she had done in 2018 for her PFO  He thought that her numbers look good she is premenopausal and a recent coronary calcium score 0 she could hold off on intervention he thought if the drug were needed it would be Zetia but to do nothing for now  and reassess in 5 years    Brain aneurysm  3 mm left intracerebral aneurysm follows at [facility] had recent MRI results pending    Stroke (CMS/HCC)  Right posterior cryptogenic cerebellar cortical infarct 2018 work-up revealed JOSUÉ with JOSUÉ revealed PFO that was repaired Amplatzer device February 2019    SVT (supraventricular tachycardia) (CMS/HCC)  Part of her work-up for stroke we did a monitor showed nonsustained SVT up to 10 beats December 2018 no clinical palpitations    PVD (peripheral vascular disease) (CMS/HCC)  Mild atheroma seen on thoracic aorta transesophageal echo 2018           Status post PFO occlusion2/19 found during work-up of cryptogenic cerebellar stroke     It been a year since her procedure she no longer needs antibiotic prophylaxis      She has minimal plaque seen on   Her thoracic aorta with JOSUÉ in 2018  Recent coronary calcium score 0    She has probable heterozygous familial with baseline LDL cholesterol 190  she had some mild issues with Lipitor in the past    She was evaluated by  who felt on looking at her detailed lipid profile, that she is premenopausal  Had high LDL receptors and that   she could hold off on therapy   And that the indicated therapy would be Zetia and repeat calcium score in about 5 years  For now, no treatment  Her other active issue of brain aneurysm small follows at [facility] had recent imaging  \    I will see her back in 1 year with echocardiogram  This letter was generated using speech recognition software.  Please excuse any typographical errors.    This letter was generated using speech recognition software.  Please excuse any typographical errors.    Luis Dumont MD  12/16/2019

## 2019-12-16 ENCOUNTER — OFFICE VISIT (OUTPATIENT)
Dept: CARDIOLOGY | Facility: CLINIC | Age: 49
End: 2019-12-16
Payer: COMMERCIAL

## 2019-12-16 ENCOUNTER — HOSPITAL ENCOUNTER (OUTPATIENT)
Dept: CARDIOLOGY | Facility: CLINIC | Age: 49
Discharge: HOME | End: 2019-12-16
Attending: INTERNAL MEDICINE
Payer: COMMERCIAL

## 2019-12-16 VITALS
DIASTOLIC BLOOD PRESSURE: 80 MMHG | WEIGHT: 160 LBS | SYSTOLIC BLOOD PRESSURE: 120 MMHG | BODY MASS INDEX: 28.35 KG/M2 | HEIGHT: 63 IN

## 2019-12-16 VITALS
HEART RATE: 61 BPM | BODY MASS INDEX: 28.34 KG/M2 | SYSTOLIC BLOOD PRESSURE: 100 MMHG | DIASTOLIC BLOOD PRESSURE: 72 MMHG | OXYGEN SATURATION: 98 % | HEIGHT: 63 IN

## 2019-12-16 DIAGNOSIS — Q21.12 PFO (PATENT FORAMEN OVALE): ICD-10-CM

## 2019-12-16 DIAGNOSIS — Q21.12 PFO (PATENT FORAMEN OVALE): Primary | ICD-10-CM

## 2019-12-16 LAB
AORTIC ROOT ANNULUS - M-MODE: 3.1 CM
AORTIC ROOT ANNULUS: 3.5 CM
AORTIC VALVE MEAN VELOCITY: 0.96 M/S
AORTIC VALVE VELOCITY TIME INTEGRAL: 32 CM
ASCENDING AORTA: 3.6 CM
AV MEAN GRADIENT: 4 MMHG
AV PEAK GRADIENT: 9 MMHG
AV PEAK VELOCITY-S: 1.51 M/S
AV VALVE AREA: 2.38 CM2
BSA FOR ECHO PROCEDURE: 1.8 M2
CUSP SEPARATION: 2.1 CM
DOP CALC LVOT STROKE VOLUME: 76.16 ML
E WAVE DECELERATION TIME: 208 MS
E/A RATIO: 1
E/E' RATIO: 7.7
E/LAT E' RATIO: 6.6
EDV (BP): 55.3 CM3
EF (A4C): 75.3 %
EF A2C: 68.2 %
EJECTION FRACTION: 70.9 %
ESV (BP): 16.1 CM3
FRACTIONAL SHORTENING: 42.4 %
INTERVENTRICULAR SEPTUM: 0.67 CM
LA ESV (BP): 49 CM3
LA ESV INDEX (A2C): 22.17 CM3/M2
LA ESV INDEX (BP): 27.22 CM3/M2
LA/AORTA RATIO: 1.52
LAAS-AP2: 16.6 CM2
LAAS-AP4: 18.5 CM2
LAD 2D - M-MODE: 4.7 CM
LALD A4C: 5.08 CM
LALD A4C: 5.59 CM
LAV-S: 39.9 CM3
LEFT ATRIUM VOLUME INDEX: 30.56 CM3/M2
LEFT ATRIUM VOLUME: 55 CM3
LEFT INTERNAL DIMENSION IN SYSTOLE: 2.77 CM (ref 2.45–3.7)
LEFT VENTRICLE DIASTOLIC VOLUME INDEX: 31.72 CM3/M2
LEFT VENTRICLE DIASTOLIC VOLUME: 57.1 CM3
LEFT VENTRICLE SYSTOLIC VOLUME INDEX: 7.89 CM3/M2
LEFT VENTRICLE SYSTOLIC VOLUME: 14.2 CM3
LEFT VENTRICULAR INTERNAL DIMENSION IN DIASTOLE: 4.81 CM (ref 4.13–5.73)
LEFT VENTRICULAR POSTERIOR WALL IN END DIASTOLE: 0.78 CM (ref 0.54–1.01)
LV DIASTOLIC VOLUME: 51.2 CM3
LV ESV (APICAL 2 CHAMBER): 16.3 CM3
LVAD-AP2: 22.3 CM2
LVAD-AP4: 23 CM2
LVAS-AP2: 11.3 CM2
LVAS-AP4: 9.87 CM2
LVEDVI(A2C): 28.44 CM3/M2
LVEDVI(BP): 30.72 CM3/M2
LVESVI(A2C): 9.06 CM3/M2
LVESVI(BP): 8.94 CM3/M2
LVLD-AP2: 8.05 CM
LVLD-AP4: 7.66 CM
LVLS-AP2: 6.75 CM
LVLS-AP4: 5.95 CM
LVOT 2D: 2.1 CM
LVOT A: 3.46 CM2
LVOT MG: 2 MMHG
LVOT MV: 0.64 M/S
LVOT PEAK VELOCITY: 0.9 M/S
LVOT VTI: 22 CM
MV E'TISSUE VEL-LAT: 0.11 M/S
MV E'TISSUE VEL-MED: 0.09 M/S
MV PEAK A VEL: 0.71 M/S
MV PEAK E VEL: 0.69 M/S
POSTERIOR WALL: 0.78 CM
RVOT VMAX: 0.75 M/S
Z-SCORE OF LEFT VENTRICULAR DIMENSION IN END DIASTOLE: -0.11
Z-SCORE OF LEFT VENTRICULAR DIMENSION IN END SYSTOLE: -0.65
Z-SCORE OF LEFT VENTRICULAR POSTERIOR WALL IN END DIASTOLE: 0.3

## 2019-12-16 PROCEDURE — 93000 ELECTROCARDIOGRAM COMPLETE: CPT | Performed by: INTERNAL MEDICINE

## 2019-12-16 PROCEDURE — 99214 OFFICE O/P EST MOD 30 MIN: CPT | Performed by: INTERNAL MEDICINE

## 2019-12-16 PROCEDURE — 93306 TTE W/DOPPLER COMPLETE: CPT | Performed by: INTERNAL MEDICINE

## 2019-12-16 NOTE — ASSESSMENT & PLAN NOTE
HER LDL cholesterol levels of 190 had detailed profile follows with Dr. Ellis  Concerned because she did have some atheroma seen on her thoracic aorta with JOSUÉ she had done in 2018 for her PFO  He thought that her numbers look good she is premenopausal and a recent coronary calcium score 0 she could hold off on intervention he thought if the drug were needed it would be Zetia but to do nothing for now and reassess in 5 years

## 2019-12-16 NOTE — ASSESSMENT & PLAN NOTE
She had PFO closure with Amplatzer device echo today stable normal right-sided chambers no sign of flow normal LV systolic function she presented with a cryptogenic cerebellar stroke

## 2019-12-16 NOTE — ASSESSMENT & PLAN NOTE
Part of her work-up for stroke we did a monitor showed nonsustained SVT up to 10 beats December 2018 no clinical palpitations

## 2019-12-16 NOTE — LETTER
"December 16, 2019     DEIRDRE Ludwig  [address]  [place] [address]    Patient: Orly Lara  YOB: 1970  Date of Visit: 12/16/2019      Dear Nieves    Thank you for referring Orly Lara to me for evaluation. Below are my notes for this consultation.    If you have questions, please do not hesitate to call me. I look forward to following your patient along with you.         Sincerely,        Luis Dumont MD        CC: MD Donte Hdz MD Becker, Andrea J, MD  12/16/2019  4:21 PM  Sign at close encounter  Cardiology Consult/New Patient    Bethany Celestin CRNP          Orly Lara is a 49 y.o. female identifies as who presents with ']]]]]]  She returns for cardiac follow-up echocardiogram images personally viewed  She presented November 2018 with left cerebellar stroke work-up revealed patent foramen ovale and she had accessible percutaneous closure November 2018 with Amplatz device  Remains on aspirin  She has no known obstructive CAD with a calcium score of 0 in 2015  She also has an incidental 3 mm aneurysms seen in the left intracerebral carotid vessels and she is followed by  at [facility]    She is heterozygous for familial hyperlipidemia     She had had an advanced lipid profile etc. her LDL was buoyant and she did not feel she needed medication  I am concerned because minimal plaque was seen in the transthoracic echocardiogram of her aorta and strong family history of premature cardiac disease in her family  She had coronary calcium score in August of 0  She had been on Lipitor in the past but she was \"tired on it  She is now being evaluated in advance of the clinic with Dr. Brito  He reviewed all her data and felt at this time with a calcium score of 0 premenopausal and increased LDL receptors we could postpone treatment for hyperlipidemia and we will reassess in 5 years      Electronically signed by " Luis Dumont MD at 8/2/2019  4:04 PM       Office Visit on 8/2/2019                             Patient Active Problem List    Diagnosis Date Noted   • Polygenic hypercholesterolemia 08/16/2019   • Hyperglycemia 08/02/2019   • Dilated aortic root (CMS/HCC) 08/02/2019   • PVD (peripheral vascular disease) (CMS/[facility]) 08/02/2019   • Cerebellar infarct (CMS/[facility]) 05/23/2019   • Brain aneurysm 05/23/2019   • SVT (supraventricular tachycardia) (CMS/[facility]) 12/14/2018   • PFO (patent foramen ovale) 11/30/2018   • Stroke (CMS/[facility]) 11/08/2018   • Vertigo 10/30/2018   • Vestibular disequilibrium, left 08/05/2016       Medical History:   Past Medical History:   Diagnosis Date   • Hypertension    • Lipid disorder    • Positional vertigo    • Stroke (CMS/[facility])        Surgical History:   Past Surgical History:   Procedure Laterality Date   • HYSTEROSCOPY W/ ENDOMETRIAL ABLATION     • PATENT FORAMEN OVALE CLOSURE         Allergies: Crestor [rosuvastatin]; Iodinated contrast media; and Lipitor [atorvastatin]    Current Outpatient Medications   Medication Sig Dispense Refill   • ascorbic acid (ascorbic acid with jason hips) 500 mg tablet Take 500 mg by mouth daily.     • aspirin 81 mg enteric coated tablet Take 1 tablet (81 mg total) by mouth daily. 90 tablet 1   • Lactobacillus acidophilus (PROBIOTIC ORAL) Take 1 capsule by mouth daily.     • MAGNESIUM ORAL Take 30 mg by mouth daily.     • valACYclovir (VALTREX) 1 gram tablet Take two tablets every 12 hours for one day, as needed for outbreak 30 tablet 2   • calcium carb/mag carb/folic ac (MAGNEBIND 400 ORAL) Take by mouth. 500 mg two tablets daily       No current facility-administered medications for this visit.        Social History:   Social History     Socioeconomic History   • Marital status:      Spouse name: None   • Number of children: None   • Years of education: None   • Highest education level: None   Occupational History   • None   Social Needs   • Financial  resource strain: None   • Food insecurity:     Worry: None     Inability: None   • Transportation needs:     Medical: None     Non-medical: None   Tobacco Use   • Smoking status: Former Smoker     Last attempt to quit: 2006     Years since quittin.9   • Smokeless tobacco: Never Used   Substance and Sexual Activity   • Alcohol use: Yes     Comment: socially   • Drug use: Defer   • Sexual activity: Defer   Lifestyle   • Physical activity:     Days per week: None     Minutes per session: None   • Stress: None   Relationships   • Social connections:     Talks on phone: None     Gets together: None     Attends Taoism service: None     Active member of club or organization: None     Attends meetings of clubs or organizations: None     Relationship status: None   • Intimate partner violence:     Fear of current or ex partner: None     Emotionally abused: None     Physically abused: None     Forced sexual activity: None   Other Topics Concern   • None   Social History Narrative    Do you wear your seatbelt? Yes    Do you have smoke detector in your home? Yes    Do you have a carbon monoxide detector in your home? 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    Current Marital Status?        Family History:   Family History   Problem Relation Age of Onset   • Heart attack Biological Mother    • Hyperlipidemia Biological Mother        Review of Systems   Review of Systems   Constitution: Negative for malaise/fatigue, weight gain and weight loss.   HENT: Negative for hearing loss and hoarse voice.    Eyes: Negative for visual disturbance.   Cardiovascular: Negative for chest pain, claudication, cyanosis, dyspnea on exertion, irregular heartbeat, leg swelling, near-syncope, orthopnea, palpitations, paroxysmal nocturnal dyspnea and syncope.   Respiratory: Negative for cough, hemoptysis, shortness of breath, sleep disturbances due to breathing, snoring, sputum production and wheezing.    Endocrine:  Negative for cold intolerance and heat intolerance.   Hematologic/Lymphatic: Negative.  Negative for bleeding problem. Does not bruise/bleed easily.   Skin: Negative.  Negative for rash.   Musculoskeletal: Negative for arthritis, falls, joint pain, muscle cramps and myalgias.   Gastrointestinal: Negative for abdominal pain, anorexia, change in bowel habit, constipation, diarrhea, dysphagia, heartburn, jaundice and nausea.   Genitourinary: Negative for frequency and nocturia.   Neurological: Positive for vertigo. Negative for dizziness, focal weakness, headaches, light-headedness, numbness and tremors.   Psychiatric/Behavioral: Negative for memory loss. The patient does not have insomnia and is not nervous/anxious.    Allergic/Immunologic: Negative for hives.       Objective       Vitals:    12/16/19 1527   BP: 100/72   Pulse: 61   SpO2: 98%       Physical Exam   Constitutional: She is oriented to person, place, and time. She appears well-developed and well-nourished. No distress.   HENT:   Head: Normocephalic and atraumatic.   Nose: Nose normal.   Eyes: Conjunctivae are normal. No scleral icterus.   Neck: No JVD present.   Cardiovascular: Normal rate, regular rhythm, normal heart sounds and intact distal pulses. Exam reveals no gallop and no friction rub.   No murmur heard.  Pulmonary/Chest: Effort normal. No stridor. No respiratory distress. She has no wheezes. She has no rales. She exhibits no tenderness.   Abdominal: There is no tenderness.   Musculoskeletal: She exhibits no edema or deformity.   Neurological: She is alert and oriented to person, place, and time.   Skin: Skin is warm and dry.   Psychiatric: She has a normal mood and affect.        Labs   Lab Results   Component Value Date    WBC 5.9 12/10/2019    HGB 12.9 12/10/2019    HCT 39.8 12/10/2019    PLT CANCELED 12/10/2019    CHOL 285 (H) 12/10/2019    TRIG 61 12/10/2019    HDL 83 12/10/2019    ALT 17 12/10/2019    AST 20 12/10/2019     12/10/2019     K 4.6 12/10/2019     12/10/2019    CREATININE 0.80 12/10/2019    BUN 12 12/10/2019    CO2 21 12/10/2019    TSH 2.750 05/31/2019    INR 0.9 02/07/2019    HGBA1C 5.4 05/31/2019     LDL CHOL 179 HDL 67    HDL 39  Small   LDL size 22  HDLC 76  LDL C1 93  LDL particle #2100  Total cholesterol 286                    Imaging    ECHO  Gavin MILD PLAQUE AORTA 11/18 PFO    ECHO 12/19 TTE AMPLATZER DEVICE NO FLOW NL EF        CATH/cv surgery  PFO REPAIR 2/19 AMPLATZER DEVICE DR MARROQUIN    CAT SCAN  Cor garett score zero 2015 thymus tissue    Coronary calcium score August 2019 0    \        ECHO  Gavin 11/18 EF 65%  PFO with left-to-right shunt plaque seen on aorta  agitated saline.       1. Normal left ventricular wall thickness and cavity with preserved systolic function. Estimated EF 65%. No regional wall motion abnormalities.   2. Three cusped aortic valve.  Mild aortic regurgitation.  Normal aortic dimensions. Simple atheroma in the visualized portions of the descending aorta without complex elements.   3. Normal mitral valve.  Trace mitral regurgitation.  Systolic predominance of pulmonary vein flow by spectral doppler.    4. Normal left atrial size. No thrombus or mass seen in the left atrium or left atrial appendage. Left atrial appendage peak emptying velocity 40cm/s.  5. Evidence of small PFO with left to right shunt with agitated saline.       Echo march 2019 aorta 3.9  Ef70% can't exclude smal residual pfo       MRI   mri head 10/18 r cerebellar old infarc  cta brain 11/18 left carotid terminus aneurysm 3 mm, chronic lacunar infarct left cerebellum    cta head 11/18 3mm anudryeleft carotid terminus  Mild plaque L internal caroitd    ELECTROPHYSIOLOGY  12/18 monitor 10 beat svt    ECG normla  sinus rhythm normal sinus rhythm nonspecific anterior T wave inversions no change       Assessment/Plan     PFO (patent foramen ovale)  She had PFO closure with Amplatzer device echo today stable normal right-sided  chambers no sign of flow normal LV systolic function she presented with a cryptogenic cerebellar stroke    Polygenic hypercholesterolemia  HER LDL cholesterol levels of 190 had detailed profile follows with Dr. Ellis  Concerned because she did have some atheroma seen on her thoracic aorta with JOSUÉ she had done in 2018 for her PFO  He thought that her numbers look good she is premenopausal and a recent coronary calcium score 0 she could hold off on intervention he thought if the drug were needed it would be Zetia but to do nothing for now and reassess in 5 years    Brain aneurysm  3 mm left intracerebral aneurysm follows at [place] had recent MRI results pending    Stroke (CMS/HCC)  Right posterior cryptogenic cerebellar cortical infarct 2018 work-up revealed JOSUÉ with JOSUÉ revealed PFO that was repaired Amplatzer device February 2019    SVT (supraventricular tachycardia) (CMS/HCC)  Part of her work-up for stroke we did a monitor showed nonsustained SVT up to 10 beats December 2018 no clinical palpitations    PVD (peripheral vascular disease) (CMS/HCC)  Mild atheroma seen on thoracic aorta transesophageal echo 2018           Status post PFO occlusion2/19 found during work-up of cryptogenic cerebellar stroke     It been a year since her procedure she no longer needs antibiotic prophylaxis      She has minimal plaque seen on   Her thoracic aorta with JOSUÉ in 2018  Recent coronary calcium score 0    She has probable heterozygous familial with baseline LDL cholesterol 190  she had some mild issues with Lipitor in the past    She was evaluated by  who felt on looking at her detailed lipid profile, that she is premenopausal  Had high LDL receptors and that   she could hold off on therapy   And that the indicated therapy would be Zetia and repeat calcium score in about 5 years  For now, no treatment  Her other active issue of brain aneurysm small follows at [place] had recent imaging  \    I will see her  back in 1 year with echocardiogram  This letter was generated using speech recognition software.  Please excuse any typographical errors.    This letter was generated using speech recognition software.  Please excuse any typographical errors.    Luis Dumont MD  12/16/2019

## 2019-12-16 NOTE — ASSESSMENT & PLAN NOTE
Right posterior cryptogenic cerebellar cortical infarct 2018 work-up revealed JOSUÉ with JOSUÉ revealed PFO that was repaired Amplatzer device February 2019

## 2019-12-17 ENCOUNTER — TELEPHONE (OUTPATIENT)
Dept: CARDIOLOGY | Facility: CLINIC | Age: 49
End: 2019-12-17

## 2019-12-17 NOTE — TELEPHONE ENCOUNTER
I spoke to her I spoke to her about results of her MRI versus following up with neurology stable findings

## 2020-03-11 ENCOUNTER — TELEPHONE (OUTPATIENT)
Dept: FAMILY MEDICINE | Facility: CLINIC | Age: 50
End: 2020-03-11

## 2020-03-11 NOTE — LETTER
March 12, 2020     Patient: Orly Lara  YOB: 1970  Date of Visit: 3/11/2020    To Whom it May Concern:    Please excuse Orly for her absences from work on 3/10/2020 and 3/11/2020.   She may return to work 3/12/2020.   If you have any questions or concerns, please don't hesitate to call.         Sincerely,         DEIRDRE Encarnacion        CC: No Recipients

## 2020-03-11 NOTE — TELEPHONE ENCOUNTER
Orly called and would like to know if you could put a letter together for her to excuse her from work since her and her daughter   Bessie have been sick. Please excuse her for 3/10/20, 3/11/20 to return to work 3/12/20. Thanks.

## 2020-04-22 ENCOUNTER — TELEPHONE (OUTPATIENT)
Dept: FAMILY MEDICINE | Facility: CLINIC | Age: 50
End: 2020-04-22

## 2020-06-10 ENCOUNTER — OFFICE VISIT (OUTPATIENT)
Dept: FAMILY MEDICINE | Facility: CLINIC | Age: 50
End: 2020-06-10
Payer: COMMERCIAL

## 2020-06-10 VITALS
TEMPERATURE: 98.1 F | OXYGEN SATURATION: 97 % | HEIGHT: 63 IN | SYSTOLIC BLOOD PRESSURE: 125 MMHG | DIASTOLIC BLOOD PRESSURE: 88 MMHG | WEIGHT: 175.4 LBS | HEART RATE: 64 BPM | BODY MASS INDEX: 31.08 KG/M2

## 2020-06-10 DIAGNOSIS — Z12.39 SCREENING FOR BREAST CANCER: ICD-10-CM

## 2020-06-10 DIAGNOSIS — I47.10 SVT (SUPRAVENTRICULAR TACHYCARDIA) (CMS/HCC): ICD-10-CM

## 2020-06-10 DIAGNOSIS — E78.00 POLYGENIC HYPERCHOLESTEROLEMIA: ICD-10-CM

## 2020-06-10 DIAGNOSIS — I77.810 DILATED AORTIC ROOT (CMS/HCC): ICD-10-CM

## 2020-06-10 DIAGNOSIS — Z00.00 PREVENTATIVE HEALTH CARE: Primary | ICD-10-CM

## 2020-06-10 DIAGNOSIS — R73.9 HYPERGLYCEMIA: ICD-10-CM

## 2020-06-10 PROBLEM — Z78.9 STATIN INTOLERANCE: Status: ACTIVE | Noted: 2020-06-10

## 2020-06-10 PROCEDURE — 36415 COLL VENOUS BLD VENIPUNCTURE: CPT | Performed by: FAMILY MEDICINE

## 2020-06-10 PROCEDURE — 99396 PREV VISIT EST AGE 40-64: CPT | Performed by: FAMILY MEDICINE

## 2020-06-10 ASSESSMENT — ENCOUNTER SYMPTOMS
FEVER: 0
COUGH: 0
FATIGUE: 0
ARTHRALGIAS: 0
CONSTIPATION: 0
NERVOUS/ANXIOUS: 0
SHORTNESS OF BREATH: 0
CHILLS: 0
FREQUENCY: 0
SORE THROAT: 0
RHINORRHEA: 0
DYSURIA: 0
ABDOMINAL PAIN: 0
DIARRHEA: 0
HEADACHES: 0
DYSPHORIC MOOD: 0

## 2020-06-10 NOTE — PATIENT INSTRUCTIONS
Thank you for coming in for your physical!    Keep up the good work with healthy diet and exercise.   Make sure to get 7-8 hours of sleep a night, and find healthy outlets for stress.   Please complete your labs if ordered.   Follow-up in one year or sooner if needed.     Thank you,     Dr. Navarro

## 2020-06-10 NOTE — ASSESSMENT & PLAN NOTE
Prior statin intolerance.   Recent coronary artery CT score 0.   Follows with Dr. Brito, ayla f/u in 5 years per pt report.

## 2020-06-10 NOTE — PROGRESS NOTES
[facility]  [address] [place] [address], [place] [address]  [phone]       Reason for visit:   Chief Complaint   Patient presents with   • Annual Exam      HPI   Orly Lara is a 50 y.o. female who presents  for a routine physical.   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    Mammogram Due Yes   Pap Smear Due No      Hep A Due No   TDAP Due No   Flu Due No   Lipids Due Yes    STI Testing: NO   Past Medical History:   Diagnosis Date   • Aneurysm (CMS/HCC)    • Hypertension    • Lipid disorder    • Positional vertigo    • Stroke (CMS/HCC)      Patient Active Problem List    Diagnosis Date Noted   • Statin intolerance 06/10/2020   • Polygenic hypercholesterolemia 08/16/2019   • Hyperglycemia 08/02/2019   • Dilated aortic root (CMS/HCC) 08/02/2019   • PVD (peripheral vascular disease) (CMS/HCC) 08/02/2019   • Cerebellar infarct (CMS/HCC) 05/23/2019   • Brain aneurysm 05/23/2019   • SVT (supraventricular tachycardia) (CMS/HCC) 12/14/2018   • PFO (patent foramen ovale) 11/30/2018   • Stroke (CMS/HCC) 11/08/2018   • Vertigo 10/30/2018   • Preventative health care 08/05/2016   • Vestibular disequilibrium, left 08/05/2016     Past Surgical History:   Procedure Laterality Date   • HYSTEROSCOPY W/ ENDOMETRIAL ABLATION     • PATENT FORAMEN OVALE CLOSURE       Social History     Socioeconomic History   • Marital status:      Spouse name: Not on file   • Number of children: Not on file   • Years of education: Not on file   • Highest education level: Not on file   Occupational History   • Not on file   Social Needs   • Financial resource strain: Not on file   • Food insecurity:     Worry: Not on file     Inability: Not on file   • Transportation needs:     Medical: Not on file     Non-medical: Not on file    Tobacco Use   • Smoking status: Former Smoker     Last attempt to quit: 2006     Years since quittin.4   • Smokeless tobacco: Never Used   Substance and Sexual Activity   • Alcohol use: Yes     Comment: 10 drinks 2 days per week   • Drug use: Not Currently   • Sexual activity: Yes     Partners: Male   Lifestyle   • Physical activity:     Days per week: Not on file     Minutes per session: Not on file   • Stress: Not on file   Relationships   • Social connections:     Talks on phone: Not on file     Gets together: Not on file     Attends Sikhism service: Not on file     Active member of club or organization: Not on file     Attends meetings of clubs or organizations: Not on file     Relationship status: Not on file   • Intimate partner violence:     Fear of current or ex partner: Not on file     Emotionally abused: Not on file     Physically abused: Not on file     Forced sexual activity: Not on file   Other Topics Concern   • Not on file   Social History Narrative    Do you wear your seatbelt? Yes    Do you have smoke detector in your home? Yes    Do you have a carbon monoxide detector in your home? 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    Current Marital Status?      Family History   Problem Relation Age of Onset   • Heart attack Biological Mother    • Hyperlipidemia Biological Mother    • Stroke Biological Mother    • Heart disease Biological Father      Crestor [rosuvastatin]; Iodinated contrast media; and Lipitor [atorvastatin]  Current Outpatient Medications   Medication Sig Dispense Refill   • ascorbic acid (ascorbic acid with jason hips) 500 mg tablet Take 500 mg by mouth daily.     • aspirin 81 mg enteric coated tablet Take 1 tablet (81 mg total) by mouth daily. 90 tablet 1   • Lactobacillus acidophilus (PROBIOTIC ORAL) Take 1 capsule by mouth daily.     • MAGNESIUM CARBONATE ORAL Take by mouth.     • MAGNESIUM ORAL Take 30 mg by mouth daily.     • omega-3 fatty acids (FISH OIL  "CONCENTRATE ORAL) Take by mouth.     • valACYclovir (VALTREX) 1 gram tablet Take two tablets every 12 hours for one day, as needed for outbreak 30 tablet 2     No current facility-administered medications for this visit.        Review of Systems   Constitutional: Negative for chills, fatigue and fever.   HENT: Negative for rhinorrhea and sore throat.    Respiratory: Negative for cough and shortness of breath.    Cardiovascular: Negative for chest pain.   Gastrointestinal: Negative for abdominal pain, constipation and diarrhea.   Genitourinary: Negative for dysuria, frequency and menstrual problem (every 4-6 month menses).        Denies breast pain or lumps   Musculoskeletal: Negative for arthralgias.   Neurological: Negative for headaches.   Psychiatric/Behavioral: Negative for dysphoric mood. The patient is not nervous/anxious.      Objective   Vitals:    06/10/20 0828   BP: 125/88   BP Location: Left upper arm   Patient Position: Sitting   Pulse: 64   Temp: 36.7 °C (98.1 °F)   TempSrc: Temporal   SpO2: 97%   Weight: 79.6 kg (175 lb 6.4 oz)   Height: 1.6 m (5' 3\")       Physical Exam   Constitutional: She appears well-developed and well-nourished. No distress.   HENT:   Right Ear: Tympanic membrane normal.   Left Ear: Tympanic membrane normal.   Nose: Nose normal. No mucosal edema or rhinorrhea.   Mouth/Throat: Oropharynx is clear and moist.   Eyes: Pupils are equal, round, and reactive to light.   Neck: No thyromegaly present.   Cardiovascular: Normal rate, regular rhythm, S1 normal and S2 normal.   Pulmonary/Chest: Breath sounds normal. No respiratory distress.   Abdominal: Bowel sounds are normal. There is no hepatomegaly. There is no tenderness.   Musculoskeletal: She exhibits no edema.   Psychiatric: Her behavior is normal. Her mood appears not anxious. She does not exhibit a depressed mood.       Procedures    Lab Results   Component Value Date    WBC 5.9 12/10/2019    HGB 12.9 12/10/2019    HCT 39.8 " 12/10/2019    PLT CANCELED 12/10/2019    CHOL 285 (H) 12/10/2019    TRIG 61 12/10/2019    HDL 83 12/10/2019    ALT 17 12/10/2019    AST 20 12/10/2019     12/10/2019    K 4.6 12/10/2019     12/10/2019    CREATININE 0.80 12/10/2019    BUN 12 12/10/2019    CO2 21 12/10/2019    TSH 2.750 05/31/2019    INR 0.9 02/07/2019    HGBA1C 5.4 05/31/2019           Assessment   Problem List Items Addressed This Visit        Circulatory    Dilated aortic root (CMS/HCC)    SVT (supraventricular tachycardia) (CMS/HCC)       Endocrine/Metabolic    Hyperglycemia    Relevant Orders    TSH w reflex FT4    Hemoglobin A1c    Polygenic hypercholesterolemia     Prior statin intolerance.   Recent coronary artery CT score 0.   Follows with Dr. Brito, next f/u in 5 years per pt report.          Relevant Medications    omega-3 fatty acids (FISH OIL CONCENTRATE ORAL)    Other Relevant Orders    TSH w reflex FT4       Other    Preventative health care - Primary     BMI 31  Continue Healthy diet/regular exercise.  Discussed healthy sleep and stress reduction.  Pap- get records (Dr. Sheila [facility])  Mammo ordered.  Vaccines UTD  Check labs as ordered.  F/U 1 year or sooner if needed.         Relevant Orders    CBC and Differential    Comprehensive metabolic panel    Lipid panel      Other Visit Diagnoses     Screening for breast cancer        Relevant Orders    BI SCREENING MAMMOGRAM BILATERAL              Zelda Navarro MD  6/10/2020

## 2020-06-10 NOTE — ASSESSMENT & PLAN NOTE
BMI 31  Continue Healthy diet/regular exercise.  Discussed healthy sleep and stress reduction.  Pap- get records (Dr. Sheila Muse [facility])  Mammo ordered.  Vaccines UTD  Check labs as ordered.  F/U 1 year or sooner if needed.

## 2020-06-11 ENCOUNTER — TELEPHONE (OUTPATIENT)
Dept: FAMILY MEDICINE | Facility: CLINIC | Age: 50
End: 2020-06-11

## 2020-06-11 LAB
ALBUMIN SERPL-MCNC: 4.7 G/DL (ref 3.8–4.8)
ALBUMIN/GLOB SERPL: 1.8 {RATIO} (ref 1.2–2.2)
ALP SERPL-CCNC: 55 IU/L (ref 39–117)
ALT SERPL-CCNC: 17 IU/L (ref 0–32)
AST SERPL-CCNC: 22 IU/L (ref 0–40)
BASOPHILS # BLD AUTO: 0 X10E3/UL (ref 0–0.2)
BASOPHILS NFR BLD AUTO: 1 %
BILIRUB SERPL-MCNC: 0.5 MG/DL (ref 0–1.2)
BUN SERPL-MCNC: 12 MG/DL (ref 6–24)
BUN/CREAT SERPL: 15 (ref 9–23)
CALCIUM SERPL-MCNC: 10.1 MG/DL (ref 8.7–10.2)
CHLORIDE SERPL-SCNC: 101 MMOL/L (ref 96–106)
CHOLEST SERPL-MCNC: 317 MG/DL (ref 100–199)
CO2 SERPL-SCNC: 23 MMOL/L (ref 20–29)
CREAT SERPL-MCNC: 0.8 MG/DL (ref 0.57–1)
EOSINOPHIL # BLD AUTO: 0.2 X10E3/UL (ref 0–0.4)
EOSINOPHIL NFR BLD AUTO: 4 %
ERYTHROCYTE [DISTWIDTH] IN BLOOD BY AUTOMATED COUNT: 12.8 % (ref 11.7–15.4)
GLOBULIN SER CALC-MCNC: 2.6 G/DL (ref 1.5–4.5)
GLUCOSE SERPL-MCNC: 113 MG/DL (ref 65–99)
HBA1C MFR BLD: 5.5 % (ref 4.8–5.6)
HCT VFR BLD AUTO: 42.6 % (ref 34–46.6)
HDLC SERPL-MCNC: 85 MG/DL
HGB BLD-MCNC: 13.5 G/DL (ref 11.1–15.9)
IMM GRANULOCYTES # BLD AUTO: 0 X10E3/UL (ref 0–0.1)
IMM GRANULOCYTES NFR BLD AUTO: 0 %
LAB CORP COMMENT:: ABNORMAL
LAB CORP EGFR IF AFRICN AM: 101 ML/MIN/1.73
LAB CORP EGFR IF NONAFRICN AM: 87 ML/MIN/1.73
LDLC SERPL CALC-MCNC: 213 MG/DL (ref 0–99)
LYMPHOCYTES # BLD AUTO: 2.3 X10E3/UL (ref 0.7–3.1)
LYMPHOCYTES NFR BLD AUTO: 39 %
MCH RBC QN AUTO: 29.2 PG (ref 26.6–33)
MCHC RBC AUTO-ENTMCNC: 31.7 G/DL (ref 31.5–35.7)
MCV RBC AUTO: 92 FL (ref 79–97)
MONOCYTES # BLD AUTO: 0.4 X10E3/UL (ref 0.1–0.9)
MONOCYTES NFR BLD AUTO: 7 %
MORPHOLOGY BLD-IMP: NORMAL
NEUTROPHILS # BLD AUTO: 3 X10E3/UL (ref 1.4–7)
NEUTROPHILS NFR BLD AUTO: 49 %
PLATELET # BLD AUTO: NORMAL X10E3/UL
POTASSIUM SERPL-SCNC: 4.6 MMOL/L (ref 3.5–5.2)
PROT SERPL-MCNC: 7.3 G/DL (ref 6–8.5)
RBC # BLD AUTO: 4.63 X10E6/UL (ref 3.77–5.28)
SODIUM SERPL-SCNC: 139 MMOL/L (ref 134–144)
T4 FREE SERPL-MCNC: 1.07 NG/DL (ref 0.82–1.77)
TRIGL SERPL-MCNC: 97 MG/DL (ref 0–149)
TSH SERPL DL<=0.005 MIU/L-ACNC: 2.23 UIU/ML (ref 0.45–4.5)
VLDLC SERPL CALC-MCNC: 19 MG/DL (ref 5–40)
WBC # BLD AUTO: 6 X10E3/UL (ref 3.4–10.8)

## 2020-06-11 NOTE — RESULT ENCOUNTER NOTE
Reviewed with patient- impaired fasting glucose. Significant hyperlipidemia in context of prior stroke, prior atheroma on echo and strong family history.  Discussed with Dr. Dumont. Reviewed that I would recommend statin.  Will reach out to pt's lipid specialist to also discuss.

## 2020-08-17 ENCOUNTER — TELEPHONE (OUTPATIENT)
Dept: FAMILY MEDICINE | Facility: CLINIC | Age: 50
End: 2020-08-17

## 2020-08-17 NOTE — TELEPHONE ENCOUNTER
Needs a 2 step PPD for work. Is that ok (Dr Navarro's pt) I can schedule her. Might need order to be put in. Let me know I will call pt

## 2020-11-02 DIAGNOSIS — Z12.39 SCREENING FOR BREAST CANCER: ICD-10-CM

## 2020-11-02 NOTE — RESULT ENCOUNTER NOTE
Flako Villalba,     Your mammogram is normal.     Let me know if you have any questions.     Thanks,   Dr. Navarro

## 2020-11-13 ENCOUNTER — OFFICE VISIT (OUTPATIENT)
Dept: FAMILY MEDICINE | Facility: CLINIC | Age: 50
End: 2020-11-13
Payer: COMMERCIAL

## 2020-11-13 VITALS
OXYGEN SATURATION: 97 % | DIASTOLIC BLOOD PRESSURE: 91 MMHG | WEIGHT: 175.4 LBS | HEART RATE: 74 BPM | SYSTOLIC BLOOD PRESSURE: 124 MMHG | HEIGHT: 63 IN | BODY MASS INDEX: 31.08 KG/M2 | TEMPERATURE: 97.7 F

## 2020-11-13 DIAGNOSIS — M25.50 MULTIPLE JOINT PAIN: Primary | ICD-10-CM

## 2020-11-13 PROCEDURE — 90686 IIV4 VACC NO PRSV 0.5 ML IM: CPT | Performed by: FAMILY MEDICINE

## 2020-11-13 PROCEDURE — 36415 COLL VENOUS BLD VENIPUNCTURE: CPT | Performed by: FAMILY MEDICINE

## 2020-11-13 PROCEDURE — 90471 IMMUNIZATION ADMIN: CPT | Performed by: FAMILY MEDICINE

## 2020-11-13 PROCEDURE — 99214 OFFICE O/P EST MOD 30 MIN: CPT | Mod: 25 | Performed by: FAMILY MEDICINE

## 2020-11-13 ASSESSMENT — ENCOUNTER SYMPTOMS
VOMITING: 0
NAUSEA: 0
SHORTNESS OF BREATH: 0

## 2020-11-13 NOTE — PROGRESS NOTES
[address] [address]  [phone]       Reason for visit:   Chief Complaint   Patient presents with   • Joint Pain      HPI   Orly Lara is a 50 y.o. female who presents with multiple joint pains.      Started to have joint pains in late .   Feels better while exercising but has aching.   Ankles, knees, hand, elbows.   No family history of autoimmune disease.   Mom dx with fibromyalgia- she is chronically achy.   Worse in  Morning- especially hands and ankles.   Worse if she sits for a while and then gets back up.   No stiffness.   No fevers, chills, fatigue out of the ordinary.          Past Medical History:   Diagnosis Date   • Aneurysm (CMS/HCC)    • Hypertension    • Lipid disorder    • Positional vertigo    • Stroke (CMS/HCC)      Past Surgical History:   Procedure Laterality Date   • HYSTEROSCOPY W/ ENDOMETRIAL ABLATION     • PATENT FORAMEN OVALE CLOSURE       Social History     Socioeconomic History   • Marital status:      Spouse name: Not on file   • Number of children: Not on file   • Years of education: Not on file   • Highest education level: Not on file   Occupational History   • Not on file   Social Needs   • Financial resource strain: Not on file   • Food insecurity     Worry: Not on file     Inability: Not on file   • Transportation needs     Medical: Not on file     Non-medical: Not on file   Tobacco Use   • Smoking status: Former Smoker     Quit date:      Years since quittin.8   • Smokeless tobacco: Never Used   Substance and Sexual Activity   • Alcohol use: Yes     Comment: 10 drinks 2 days per week   • Drug use: Not Currently   • Sexual activity: Yes     Partners: Male   Lifestyle   • Physical activity     Days per week: Not on file     Minutes per session: Not on file   • Stress: Not on file   Relationships   • Social connections     Talks on phone: Not on file     Gets together: Not on file      Attends Confucianism service: Not on file     Active member of club or organization: Not on file     Attends meetings of clubs or organizations: Not on file     Relationship status: Not on file   • Intimate partner violence     Fear of current or ex partner: Not on file     Emotionally abused: Not on file     Physically abused: Not on file     Forced sexual activity: Not on file   Other Topics Concern   • Not on file   Social History Narrative    Do you wear your seatbelt? Yes    Do you have smoke detector in your home? Yes    Do you have a carbon monoxide detector in your home? 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    Current Marital Status?      Family History   Problem Relation Age of Onset   • Heart attack Biological Mother    • Hyperlipidemia Biological Mother    • Stroke Biological Mother    • Heart disease Biological Father      Crestor [rosuvastatin], Iodinated contrast media, and Lipitor [atorvastatin]  Current Outpatient Medications   Medication Sig Dispense Refill   • ascorbic acid (ascorbic acid with jason hips) 500 mg tablet Take 500 mg by mouth daily.     • aspirin 81 mg enteric coated tablet Take 1 tablet (81 mg total) by mouth daily. 90 tablet 1   • Lactobacillus acidophilus (PROBIOTIC ORAL) Take 1 capsule by mouth daily.     • MAGNESIUM ORAL Take 30 mg by mouth daily.     • omega-3 fatty acids (FISH OIL CONCENTRATE ORAL) Take by mouth.     • valACYclovir (VALTREX) 1 gram tablet Take two tablets every 12 hours for one day, as needed for outbreak 30 tablet 2     No current facility-administered medications for this visit.        Review of Systems   Respiratory: Negative for shortness of breath.    Cardiovascular: Negative for chest pain.   Gastrointestinal: Negative for nausea and vomiting.     Objective   Vitals:    11/13/20 0925   BP: (!) 124/91   BP Location: Left upper arm   Patient Position: Sitting   Pulse: 74   Temp: 36.5 °C (97.7 °F)   TempSrc: Temporal   SpO2: 97%   Weight:  "79.6 kg (175 lb 6.4 oz)   Height: 1.6 m (5' 3\")       Physical Exam  Constitutional:       Appearance: She is well-developed.   Cardiovascular:      Rate and Rhythm: Normal rate and regular rhythm.      Heart sounds: S1 normal and S2 normal. No murmur. No friction rub. No gallop.    Pulmonary:      Effort: Pulmonary effort is normal. No respiratory distress.      Breath sounds: Normal breath sounds. No wheezing, rhonchi or rales.   Musculoskeletal:      Comments: Bilateral hands without swelling or deformity; no hand joint ttp         Procedures    Lab Results   Component Value Date    WBC 6.0 06/10/2020    HGB 13.5 06/10/2020    HCT 42.6 06/10/2020    PLT CANCELED 06/10/2020    CHOL 317 (H) 06/10/2020    TRIG 97 06/10/2020    HDL 85 06/10/2020    ALT 17 06/10/2020    AST 22 06/10/2020     06/10/2020    K 4.6 06/10/2020     06/10/2020    CREATININE 0.80 06/10/2020    BUN 12 06/10/2020    CO2 23 06/10/2020    TSH 2.230 06/10/2020    INR 0.9 02/07/2019    HGBA1C 5.5 06/10/2020         Assessment   Problem List Items Addressed This Visit     None      Visit Diagnoses     Multiple joint pain    -  Primary    Will check labs as ordered for f/o autoimmune disease. F/U after labs.    Relevant Orders    CBC and Differential    Comprehensive metabolic panel    Sedimentation rate    C-reactive protein    Rheumatoid factor    JETHRO Screen (Automated)    TSH w reflex FT4    Lyme Disease Antibodies (IgG, IgM),    HIV 1,2 AB P24 AG    Hepatitis C antibody              Zelda Navarro MD  11/13/2020                     "

## 2020-11-14 LAB
ALBUMIN SERPL-MCNC: 4.6 G/DL (ref 3.8–4.8)
ALBUMIN/GLOB SERPL: 1.7 {RATIO} (ref 1.2–2.2)
ALP SERPL-CCNC: 61 IU/L (ref 39–117)
ALT SERPL-CCNC: 14 IU/L (ref 0–32)
ANA TITR SER IF: NEGATIVE {TITER}
AST SERPL-CCNC: 20 IU/L (ref 0–40)
BASOPHILS # BLD AUTO: 0 X10E3/UL (ref 0–0.2)
BASOPHILS NFR BLD AUTO: 1 %
BILIRUB SERPL-MCNC: 0.6 MG/DL (ref 0–1.2)
BUN SERPL-MCNC: 13 MG/DL (ref 6–24)
BUN/CREAT SERPL: 15 (ref 9–23)
CALCIUM SERPL-MCNC: 9.9 MG/DL (ref 8.7–10.2)
CHLORIDE SERPL-SCNC: 103 MMOL/L (ref 96–106)
CO2 SERPL-SCNC: 22 MMOL/L (ref 20–29)
CREAT SERPL-MCNC: 0.87 MG/DL (ref 0.57–1)
CRP SERPL-MCNC: 2 MG/L (ref 0–10)
EOSINOPHIL # BLD AUTO: 0.1 X10E3/UL (ref 0–0.4)
EOSINOPHIL NFR BLD AUTO: 2 %
ERYTHROCYTE [DISTWIDTH] IN BLOOD BY AUTOMATED COUNT: 12.9 % (ref 11.7–15.4)
ERYTHROCYTE [SEDIMENTATION RATE] IN BLOOD BY WESTERGREN METHOD: 17 MM/HR (ref 0–32)
GLOBULIN SER CALC-MCNC: 2.7 G/DL (ref 1.5–4.5)
GLUCOSE SERPL-MCNC: 109 MG/DL (ref 65–99)
HCT VFR BLD AUTO: 41.2 % (ref 34–46.6)
HGB BLD-MCNC: 13.9 G/DL (ref 11.1–15.9)
HIV 1+2 AB+HIV1 P24 AG SERPL QL IA: NON REACTIVE
IMM GRANULOCYTES # BLD AUTO: 0 X10E3/UL (ref 0–0.1)
IMM GRANULOCYTES NFR BLD AUTO: 0 %
LAB CORP EGFR IF AFRICN AM: 91 ML/MIN/1.73
LAB CORP EGFR IF NONAFRICN AM: 79 ML/MIN/1.73
LAB CORP PLEASE NOTE:: NORMAL
LYMPHOCYTES # BLD AUTO: 2.1 X10E3/UL (ref 0.7–3.1)
LYMPHOCYTES NFR BLD AUTO: 41 %
MCH RBC QN AUTO: 30.8 PG (ref 26.6–33)
MCHC RBC AUTO-ENTMCNC: 33.7 G/DL (ref 31.5–35.7)
MCV RBC AUTO: 91 FL (ref 79–97)
MONOCYTES # BLD AUTO: 0.4 X10E3/UL (ref 0.1–0.9)
MONOCYTES NFR BLD AUTO: 7 %
MORPHOLOGY BLD-IMP: NORMAL
NEUTROPHILS # BLD AUTO: 2.5 X10E3/UL (ref 1.4–7)
NEUTROPHILS NFR BLD AUTO: 49 %
PLATELET # BLD AUTO: NORMAL X10E3/UL
POTASSIUM SERPL-SCNC: 4.7 MMOL/L (ref 3.5–5.2)
PROT SERPL-MCNC: 7.3 G/DL (ref 6–8.5)
RBC # BLD AUTO: 4.52 X10E6/UL (ref 3.77–5.28)
RHEUMATOID FACT SERPL-ACNC: <10 IU/ML (ref 0–13.9)
SODIUM SERPL-SCNC: 139 MMOL/L (ref 134–144)
T4 FREE SERPL-MCNC: 1.13 NG/DL (ref 0.82–1.77)
TSH SERPL DL<=0.005 MIU/L-ACNC: 1.92 UIU/ML (ref 0.45–4.5)
WBC # BLD AUTO: 5.1 X10E3/UL (ref 3.4–10.8)

## 2020-11-15 LAB
B BURGDOR IGG PATRN SER IB-IMP: NEGATIVE
B BURGDOR IGM PATRN SER IB-IMP: NEGATIVE
B BURGDOR18KD IGG SER QL IB: NORMAL
B BURGDOR23KD IGG SER QL IB: NORMAL
B BURGDOR23KD IGM SER QL IB: NORMAL
B BURGDOR28KD IGG SER QL IB: NORMAL
B BURGDOR30KD IGG SER QL IB: NORMAL
B BURGDOR39KD IGG SER QL IB: NORMAL
B BURGDOR39KD IGM SER QL IB: NORMAL
B BURGDOR41KD IGG SER QL IB: NORMAL
B BURGDOR41KD IGM SER QL IB: NORMAL
B BURGDOR45KD IGG SER QL IB: NORMAL
B BURGDOR58KD IGG SER QL IB: NORMAL
B BURGDOR66KD IGG SER QL IB: NORMAL
B BURGDOR93KD IGG SER QL IB: NORMAL

## 2020-11-16 ENCOUNTER — LAB REQUISITION (OUTPATIENT)
Dept: LAB | Facility: HOSPITAL | Age: 50
End: 2020-11-16
Payer: COMMERCIAL

## 2020-11-16 DIAGNOSIS — Z00.8 ENCOUNTER FOR OTHER GENERAL EXAMINATION: ICD-10-CM

## 2020-11-16 PROCEDURE — 86480 TB TEST CELL IMMUN MEASURE: CPT | Performed by: PREVENTIVE MEDICINE

## 2020-11-18 LAB
M TB IFN-G BLD-IMP: NEGATIVE
MITOGEN-NIL: >10 IU/ML
NIL: 0.07 IU/ML
TB AG-NIL: 0 IU/ML
TB2 AG - NIL: 0.01 IU/ML

## 2020-11-19 ENCOUNTER — TELEPHONE (OUTPATIENT)
Dept: INFECTIOUS DISEASES | Facility: HOSPITAL | Age: 50
End: 2020-11-19

## 2020-11-19 NOTE — RESULT ENCOUNTER NOTE
Called patient to discuss results.  All results normal. If joint aches persistent, t/c rheum eval for seronegative RA. Discussed with patient who expressed understanding.  Call back with questions.

## 2020-11-19 NOTE — TELEPHONE ENCOUNTER
Orly called because she is a new employee, orientation to start 11/30.  She explained that she had cold symptoms, fatigue, no taste or smell and went to [facility] to be tested, she is awaiting results which should be back on Friday or Monday 11/23.  Told to call back with the results of her test and additional information will be available at that time as to next steps.   Call back # is [phone]

## 2020-12-10 ENCOUNTER — TELEPHONE (OUTPATIENT)
Dept: CARDIOLOGY | Facility: CLINIC | Age: 50
End: 2020-12-10

## 2020-12-10 NOTE — TELEPHONE ENCOUNTER
Aprecia:    Please obtain another precert for this pt.  The current one had  due to rescheduling pt.  Thank you..

## 2020-12-11 ASSESSMENT — ENCOUNTER SYMPTOMS
ORTHOPNEA: 0
SLEEP DISTURBANCES DUE TO BREATHING: 0
FREQUENCY: 0
SPUTUM PRODUCTION: 0
FOCAL WEAKNESS: 0
VERTIGO: 1
WEIGHT GAIN: 0
BRUISES/BLEEDS EASILY: 0
MUSCLE CRAMPS: 0
INSOMNIA: 0
CONSTIPATION: 0
ABDOMINAL PAIN: 0
HEMATOLOGIC/LYMPHATIC NEGATIVE: 1
CHANGE IN BOWEL HABIT: 0
DYSPNEA ON EXERTION: 0
HEARTBURN: 0
DIZZINESS: 0
IRREGULAR HEARTBEAT: 0
NERVOUS/ANXIOUS: 0
DIARRHEA: 0
LIGHT-HEADEDNESS: 0
SYNCOPE: 0
SNORING: 0
CLAUDICATION: 0
SHORTNESS OF BREATH: 0
FALLS: 0
WHEEZING: 0
HEADACHES: 0
MYALGIAS: 0
PND: 0
NEAR-SYNCOPE: 0
ANOREXIA: 0
TREMORS: 0
PALPITATIONS: 0
MEMORY LOSS: 0
WEIGHT LOSS: 0
HOARSE VOICE: 0
NUMBNESS: 0
NAUSEA: 0
JAUNDICE: 0
HEMOPTYSIS: 0
COUGH: 0

## 2020-12-11 NOTE — PROGRESS NOTES
Cardiology Consult/New Patient    Zelda Navarro MD          Orly Lara is a 50 y.o. female identifies as who presents with ']]]]]]    She returns for follow-up and echocardiogram  She had PFO closure with Amplatzer device in 2019  This was repaired because she had a cryptogenic stroke diagnosis of PFO  She is polygenic hyperlipidemia follows with Dr Brito who felt that she did not need statin  Type of lipid profile she had lpa was low oxidized LDL was low  Reviewing some of her records she has atheroma in her thoracic aorta seen on transesophageal echo  in   her sister had diffuse coronary disease and thoracic aortic aneurysm recently   She agrees at this time to go on statin therapy she had some question of fatigue and Crestor we will try it every other day   t    She is a coronary calcium score of 0 in 2019  History of a brain aneurysm follows at [facility]  History of cryptogenic posterior cerebellar cortical infarct             Ref Range & Units 6/10/20 0911 Comments     Cholesterol, Total 100 - 199 mg/dL 317High       Triglycerides 0 - 149 mg/dL 97      HDL Cholesterol >39 mg/dL 85      VLDL Cholesterol Sahil 5 - 40 mg/dL 19      LDL Cholesterol Calc 0 - 99 mg/dL 213High        Gluc 109      Instructions                                                        Patient Active Problem List    Diagnosis Date Noted   • H/O Amplatzer atrial septal defect closure 2020   • Family history of premature coronary artery disease 2020   • Statin intolerance 06/10/2020   • Polygenic hypercholesterolemia 2019   • Hyperglycemia 2019   • Dilated aortic root (CMS/[facility]) 2019   • PVD (peripheral vascular disease) (CMS/[facility]) 2019   • Cerebellar infarct (CMS/[facility]) 2019   • Brain aneurysm 2019   • SVT (supraventricular tachycardia) (CMS/[facility]) 2018   • PFO (patent foramen ovale) 2018   • Stroke (CMS/[facility]) 2018   • Vertigo 10/30/2018   •  Preventative health care 2016   • Vestibular disequilibrium, left 2016       Medical History:   Past Medical History:   Diagnosis Date   • Aneurysm (CMS/HCC)    • Hypertension    • Lipid disorder    • Positional vertigo    • Stroke (CMS/HCC)        Surgical History:   Past Surgical History:   Procedure Laterality Date   • HYSTEROSCOPY W/ ENDOMETRIAL ABLATION     • PATENT FORAMEN OVALE CLOSURE         Allergies: Crestor [rosuvastatin], Iodinated contrast media, and Lipitor [atorvastatin]    Current Outpatient Medications   Medication Sig Dispense Refill   • ascorbic acid (ascorbic acid with jason hips) 500 mg tablet Take 500 mg by mouth daily.     • aspirin 81 mg enteric coated tablet Take 1 tablet (81 mg total) by mouth daily. 90 tablet 1   • Lactobacillus acidophilus (PROBIOTIC ORAL) Take 1 capsule by mouth daily.     • MAGNESIUM ORAL Take 30 mg by mouth daily.     • omega-3 fatty acids (FISH OIL CONCENTRATE ORAL) Take by mouth.     • valACYclovir (VALTREX) 1 gram tablet Take two tablets every 12 hours for one day, as needed for outbreak (Patient taking differently: as needed. Take two tablets every 12 hours for one day, as needed for outbreak ) 30 tablet 2   • rosuvastatin (CRESTOR) 20 mg tablet Take 1 tablet (20 mg total) by mouth daily. 90 tablet 1     No current facility-administered medications for this visit.        Social History:   Social History     Socioeconomic History   • Marital status:      Spouse name: None   • Number of children: None   • Years of education: None   • Highest education level: None   Occupational History   • None   Social Needs   • Financial resource strain: None   • Food insecurity     Worry: None     Inability: None   • Transportation needs     Medical: None     Non-medical: None   Tobacco Use   • Smoking status: Former Smoker     Quit date:      Years since quittin.9   • Smokeless tobacco: Never Used   Substance and Sexual Activity   • Alcohol use: Yes      Comment: 10 drinks 2 days per week   • Drug use: Not Currently   • Sexual activity: Yes     Partners: Male   Lifestyle   • Physical activity     Days per week: None     Minutes per session: None   • Stress: None   Relationships   • Social connections     Talks on phone: None     Gets together: None     Attends Christianity service: None     Active member of club or organization: None     Attends meetings of clubs or organizations: None     Relationship status: None   • Intimate partner violence     Fear of current or ex partner: None     Emotionally abused: None     Physically abused: None     Forced sexual activity: None   Other Topics Concern   • None   Social History Narrative    Do you wear your seatbelt? Yes    Do you have smoke detector in your home? Yes    Do you have a carbon monoxide detector in your home? 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    Current Marital Status?        Family History:   Family History   Problem Relation Age of Onset   • Heart attack Biological Mother    • Hyperlipidemia Biological Mother    • Stroke Biological Mother    • Heart disease Biological Father        Review of Systems   Review of Systems   Constitution: Negative for malaise/fatigue, weight gain and weight loss.   HENT: Negative for hearing loss and hoarse voice.    Eyes: Negative for visual disturbance.   Cardiovascular: Negative for chest pain, claudication, cyanosis, dyspnea on exertion, irregular heartbeat, leg swelling, near-syncope, orthopnea, palpitations, paroxysmal nocturnal dyspnea and syncope.   Respiratory: Negative for cough, hemoptysis, shortness of breath, sleep disturbances due to breathing, snoring, sputum production and wheezing.    Endocrine: Negative for cold intolerance and heat intolerance.   Hematologic/Lymphatic: Negative.  Negative for bleeding problem. Does not bruise/bleed easily.   Skin: Negative.  Negative for rash.   Musculoskeletal: Negative for arthritis, falls, joint  pain, muscle cramps and myalgias.   Gastrointestinal: Negative for abdominal pain, anorexia, change in bowel habit, constipation, diarrhea, dysphagia, heartburn, jaundice and nausea.   Genitourinary: Negative for frequency and nocturia.   Neurological: Positive for vertigo. Negative for dizziness, focal weakness, headaches, light-headedness, numbness and tremors.   Psychiatric/Behavioral: Negative for memory loss. The patient does not have insomnia and is not nervous/anxious.    Allergic/Immunologic: Negative for hives.       Objective       Vitals:    12/14/20 1520   BP: 140/90   Pulse: 64   SpO2: 97%       Physical Exam   Constitutional: She is oriented to person, place, and time. She appears well-developed and well-nourished. No distress.   HENT:   Head: Normocephalic and atraumatic.   Nose: Nose normal.   Eyes: Conjunctivae are normal. No scleral icterus.   Neck: No JVD present.   Cardiovascular: Normal rate, regular rhythm, normal heart sounds and intact distal pulses. Exam reveals no gallop and no friction rub.   No murmur heard.  Pulmonary/Chest: Effort normal. No stridor. No respiratory distress. She has no wheezes. She has no rales. She exhibits no tenderness.   Abdominal: There is no abdominal tenderness.   Musculoskeletal:         General: No deformity or edema.   Neurological: She is alert and oriented to person, place, and time.   Skin: Skin is warm and dry.   Psychiatric: She has a normal mood and affect.        Labs   Lab Results   Component Value Date    WBC 5.1 11/13/2020    HGB 13.9 11/13/2020    HCT 41.2 11/13/2020    PLT CANCELED 11/13/2020    CHOL 317 (H) 06/10/2020    TRIG 97 06/10/2020    HDL 85 06/10/2020    ALT 14 11/13/2020    AST 20 11/13/2020     11/13/2020    K 4.7 11/13/2020     11/13/2020    CREATININE 0.87 11/13/2020    BUN 13 11/13/2020    CO2 22 11/13/2020    TSH 1.920 11/13/2020    INR 0.9 02/07/2019    HGBA1C 5.5 06/10/2020     LDL CHOL 179 HDL 67    HDL 39  Small LDL  365  LDL size 22  HDLC 76  LDL C1 93  LDL particle #2100  Total cholesterol 286                    Imaging    ECHO  Gavin MILD PLAQUE AORTA 11/18 PFO    ECHO 12/20 TTE AMPLATZER DEVICE NO FLOW NL EF        CATH/cv surgery  PFO REPAIR 2/19 AMPLATZER DEVICE DR MARROQUIN    CAT SCAN  Cor garett score zero 2015 thymus tissue    Coronary calcium score August 2019 0    \        ECHO  Gavin 11/18 EF 65%  PFO with left-to-right shunt plaque seen on aorta  agitated saline.       1. Normal left ventricular wall thickness and cavity with preserved systolic function. Estimated EF 65%. No regional wall motion abnormalities.   2. Three cusped aortic valve.  Mild aortic regurgitation.  Normal aortic dimensions. Simple atheroma in the visualized portions of the descending aorta without complex elements.   3. Normal mitral valve.  Trace mitral regurgitation.  Systolic predominance of pulmonary vein flow by spectral doppler.    4. Normal left atrial size. No thrombus or mass seen in the left atrium or left atrial appendage. Left atrial appendage peak emptying velocity 40cm/s.  5. Evidence of small PFO with left to right shunt with agitated saline.       Echo march 2019 aorta 3.9  Ef70% can't exclude smal residual pfo  Aortic root four-point 1 aortic root index 2.2 cm/m²       MRI   mri head 10/18 r cerebellar old infarc  cta brain 11/18 left carotid terminus aneurysm 3 mm, chronic lacunar infarct left cerebellum    cta head 11/18 3mm anudryeleft carotid terminus  Mild plaque L internal caroitd    mra 12/19 no change 3.2 ic aneurysm    ELECTROPHYSIOLOGY  12/18 monitor 10 beat svt    ECG normla  sinus rhythm normal sinus rhythm nonspecific anterior T wave inversions no ratliff          Dec 2020                        e       Assessment/Plan     Polygenic hypercholesterolemia  She has LDL cholesterol 213 HDL of 85 she was seen in the lipid clinic and more detailed lipid profile was done it was felt that she did not need to be treated I am  concerned because she is atheroma seen on transesophageal echo strong family history of premature cardiac disease her sister recently  she had diffuse coronary artery disease  At this point she did agree to begin statin she had some question of statin intolerance in the past with fatigue we will try Crestor every other day I have a  call him to Dr Brito to discuss this decision    H/O Amplatzer atrial septal defect closure   Amplatzer repair after cryptogenic right posterior cervical stroke echo today 2020 stable    SVT (supraventricular tachycardia) (CMS/[facility])  Burst of SVT in 2018 nothing sustained no clinical issues    PVD (peripheral vascular disease) (CMS/HCC)  JOSUÉ done in 2018 atheroma seen on thoracic aorta coronary calcium score of 0 2019    Family history of premature coronary artery disease  Her sister was just found dead diffuse vascular disease stents thoracic aneurysm 55 years old    Brain aneurysm  3 mm intracerebral aneurysm follows at [place]    Cerebellar infarct (CMS/[facility])  Posterior stroke 2018 work-up revealed PFO repaired Amplatzer device    Dilated aortic root (CMS/[facility])  Echo today 2020, 4.1 cm cm aortic root index 2.2 cm meter squared  To consider CT angiogram will order at next visit  Of note her sister also had dilated thoracic aorta             Follow-up for Amplatzer repair of PFO with cryptogenic stroke in 2019 she also has hyperlipidemia and is followed at [place] for intracerebral aneurysm echo today stable    There is been some controversy over whether to begin statin on this patient with an LDL cholesterol of 213 because of her favorable lipid profile  She follows in lipid clinic with Dr. Brito  My concern was that there is atheroma seen on transesophageal echo her sister has diffuse vascular disease and recently  in her 50s  She agrees to restart statin she had trouble with fatigue in the past month on Crestor 20 mg every other day and  see how she does  Echo today stable she had Amplatzer repair of ASD  Follow-up in 3 months with lab work  Will order CT thoracic aorta at next visit for follow year mildly dilated aortic root                  This letter was generated using speech recognition software.  Please excuse any typographical errors.                      This letter was generated using speech recognition software.  Please excuse any typographical errors.    Luis Dumont MD  12/14/2020

## 2020-12-14 ENCOUNTER — HOSPITAL ENCOUNTER (OUTPATIENT)
Dept: CARDIOLOGY | Facility: CLINIC | Age: 50
Discharge: HOME | End: 2020-12-14
Payer: COMMERCIAL

## 2020-12-14 ENCOUNTER — OFFICE VISIT (OUTPATIENT)
Dept: CARDIOLOGY | Facility: CLINIC | Age: 50
End: 2020-12-14
Payer: COMMERCIAL

## 2020-12-14 VITALS
HEIGHT: 63 IN | WEIGHT: 175 LBS | SYSTOLIC BLOOD PRESSURE: 125 MMHG | DIASTOLIC BLOOD PRESSURE: 90 MMHG | BODY MASS INDEX: 31.01 KG/M2

## 2020-12-14 VITALS
BODY MASS INDEX: 32.43 KG/M2 | WEIGHT: 183 LBS | SYSTOLIC BLOOD PRESSURE: 140 MMHG | HEIGHT: 63 IN | HEART RATE: 64 BPM | OXYGEN SATURATION: 97 % | DIASTOLIC BLOOD PRESSURE: 90 MMHG

## 2020-12-14 DIAGNOSIS — E78.00 POLYGENIC HYPERCHOLESTEROLEMIA: Primary | ICD-10-CM

## 2020-12-14 DIAGNOSIS — R73.9 HYPERGLYCEMIA: ICD-10-CM

## 2020-12-14 DIAGNOSIS — Z87.74 H/O AMPLATZER ATRIAL SEPTAL DEFECT CLOSURE: ICD-10-CM

## 2020-12-14 DIAGNOSIS — Q21.12 PFO (PATENT FORAMEN OVALE): ICD-10-CM

## 2020-12-14 PROBLEM — Z82.49 FAMILY HISTORY OF PREMATURE CORONARY ARTERY DISEASE: Status: ACTIVE | Noted: 2020-12-14

## 2020-12-14 LAB
AORTIC ROOT ANNULUS: 3.4 CM
ASCENDING AORTA: 0 CM
AV PEAK GRADIENT: 9 MMHG
AV PEAK VELOCITY-S: 1.51 M/S
AV VALVE AREA: 2.01 CM2
BSA FOR ECHO PROCEDURE: 1.83 M2
CUSP SEPARATION: 1.7 CM
E WAVE DECELERATION TIME: 243 MS
E/A RATIO: 1
E/E' RATIO: 14
E/LAT E' RATIO: 6.5
EDV (BP): 100 CM3
EF (A4C): 65.1 %
EF A2C: 60.4 %
EJECTION FRACTION: 63.4 %
EST RIGHT VENT SYSTOLIC PRESSURE BY TRICUSPID REGURGITATION JET: 27 MMHG
ESV (BP): 36.6 CM3
FRACTIONAL SHORTENING: 24.4 %
INTERVENTRICULAR SEPTUM: 0.97 CM
LA ESV (BP): 62.4 CM3
LA ESV INDEX (A2C): 34.81 CM3/M2
LA ESV INDEX (BP): 34.1 CM3/M2
LA/AORTA RATIO: 1.47
LAAS-AP2: 21 CM2
LAAS-AP4: 19.1 CM2
LAD 2D: 5 CM
LALD A4C: 5.23 CM
LALD A4C: 5.78 CM
LAV-S: 63.7 CM3
LEFT ATRIUM VOLUME INDEX: 30.27 CM3/M2
LEFT ATRIUM VOLUME: 55.4 CM3
LEFT INTERNAL DIMENSION IN SYSTOLE: 3.51 CM (ref 2.6–3.93)
LEFT VENTRICLE DIASTOLIC VOLUME INDEX: 60.11 CM3/M2
LEFT VENTRICLE DIASTOLIC VOLUME: 110 CM3
LEFT VENTRICLE SYSTOLIC VOLUME INDEX: 20.98 CM3/M2
LEFT VENTRICLE SYSTOLIC VOLUME: 38.4 CM3
LEFT VENTRICULAR INTERNAL DIMENSION IN DIASTOLE: 4.64 CM (ref 4.4–6.11)
LEFT VENTRICULAR POSTERIOR WALL IN END DIASTOLE: 0.74 CM (ref 0.58–1.07)
LV DIASTOLIC VOLUME: 83.3 CM3
LV ESV (APICAL 2 CHAMBER): 33 CM3
LVAD-AP2: 27.9 CM2
LVAD-AP4: 33.9 CM2
LVAS-AP2: 16.7 CM2
LVAS-AP4: 17.8 CM2
LVEDVI(A2C): 45.52 CM3/M2
LVEDVI(BP): 54.64 CM3/M2
LVESVI(A2C): 18.03 CM3/M2
LVESVI(BP): 20 CM3/M2
LVLD-AP2: 7.8 CM
LVLD-AP4: 8.61 CM
LVLS-AP2: 7.38 CM
LVLS-AP4: 6.97 CM
LVOT 2D: 2 CM
LVOT A: 3.14 CM2
LVOT PEAK VELOCITY: 0.97 M/S
MV E'TISSUE VEL-LAT: 0.12 M/S
MV E'TISSUE VEL-MED: 0.05 M/S
MV PEAK A VEL: 0.79 M/S
MV PEAK E VEL: 0.76 M/S
POSTERIOR WALL: 0.74 CM
RAP: 10 MMHG
RVOT VMAX: 0.59 M/S
TR MAX PG: 17 MMHG
TRICUSPID VALVE PEAK REGURGITATION VELOCITY: 2.09 M/S
Z-SCORE OF LEFT VENTRICULAR DIMENSION IN END DIASTOLE: -1.11
Z-SCORE OF LEFT VENTRICULAR DIMENSION IN END SYSTOLE: 0.74
Z-SCORE OF LEFT VENTRICULAR POSTERIOR WALL IN END DIASTOLE: -0.31

## 2020-12-14 PROCEDURE — 93000 ELECTROCARDIOGRAM COMPLETE: CPT | Performed by: INTERNAL MEDICINE

## 2020-12-14 PROCEDURE — 93306 TTE W/DOPPLER COMPLETE: CPT | Performed by: INTERNAL MEDICINE

## 2020-12-14 PROCEDURE — 99214 OFFICE O/P EST MOD 30 MIN: CPT | Performed by: INTERNAL MEDICINE

## 2020-12-14 RX ORDER — ROSUVASTATIN CALCIUM 20 MG/1
20 TABLET, COATED ORAL DAILY
Qty: 90 TABLET | Refills: 1 | Status: SHIPPED | OUTPATIENT
Start: 2020-12-14 | End: 2021-03-31

## 2020-12-14 NOTE — ASSESSMENT & PLAN NOTE
Echo today December 2020, 4.1 cm cm aortic root index 2.2 cm meter squared  To consider CT angiogram will order at next visit  Of note her sister also had dilated thoracic aorta

## 2020-12-14 NOTE — ASSESSMENT & PLAN NOTE
2019 Amplatzer repair after cryptogenic right posterior cervical stroke echo today December 2020 stable

## 2020-12-14 NOTE — LETTER
2020     Zelda Navarro MD  [address] [facility] [address]  [place] [place] [address]    Patient: Orly Lara  YOB: 1970  Date of Visit: 2020      Dear Dr. Navarro:    Thank you for referring Orly Lara to me for evaluation. Below are my notes for this consultation.    If you have questions, please do not hesitate to call me. I look forward to following your patient along with you.         Sincerely,        Luis Dumont MD        CC: MD Azam La MD Igor Porotov, MD Qaisar Shah, MD Osman S Kozak, MD Becker, Luis MORAN MD  2020  3:57 PM  Sign when Signing Visit  Cardiology Consult/New Patient    Zelda Navarro MD          Orly Lara is a 50 y.o. female identifies as who presents with ']]]]]]    She returns for follow-up and echocardiogram  She had PFO closure with Amplatzer device in 2019  This was repaired because she had a cryptogenic stroke diagnosis of PFO  She is polygenic hyperlipidemia follows with Dr Brito who felt that she did not need statin  Type of lipid profile she had lpa was low oxidized LDL was low  Reviewing some of her records she has atheroma in her thoracic aorta seen on transesophageal echo  in   her sister had diffuse coronary disease and thoracic aortic aneurysm recently   She agrees at this time to go on statin therapy she had some question of fatigue and Crestor we will try it every other day   t    She is a coronary calcium score of 0 in 2019  History of a brain aneurysm follows at [place]  History of cryptogenic posterior cerebellar cortical infarct             Ref Range & Units 6/10/20 0911 Comments     Cholesterol, Total 100 - 199 mg/dL 317High       Triglycerides 0 - 149 mg/dL 97      HDL Cholesterol >39 mg/dL 85      VLDL Cholesterol Sahil 5 - 40 mg/dL 19      LDL Cholesterol Calc 0 - 99 mg/dL 213High        Gluc  109      Instructions                                                        Patient Active Problem List    Diagnosis Date Noted   • H/O Amplatzer atrial septal defect closure 12/14/2020   • Family history of premature coronary artery disease 12/14/2020   • Statin intolerance 06/10/2020   • Polygenic hypercholesterolemia 08/16/2019   • Hyperglycemia 08/02/2019   • Dilated aortic root (CMS/HCC) 08/02/2019   • PVD (peripheral vascular disease) (CMS/HCC) 08/02/2019   • Cerebellar infarct (CMS/HCC) 05/23/2019   • Brain aneurysm 05/23/2019   • SVT (supraventricular tachycardia) (CMS/HCC) 12/14/2018   • PFO (patent foramen ovale) 11/30/2018   • Stroke (CMS/HCC) 11/08/2018   • Vertigo 10/30/2018   • Preventative health care 08/05/2016   • Vestibular disequilibrium, left 08/05/2016       Medical History:   Past Medical History:   Diagnosis Date   • Aneurysm (CMS/HCC)    • Hypertension    • Lipid disorder    • Positional vertigo    • Stroke (CMS/HCC)        Surgical History:   Past Surgical History:   Procedure Laterality Date   • HYSTEROSCOPY W/ ENDOMETRIAL ABLATION     • PATENT FORAMEN OVALE CLOSURE         Allergies: Crestor [rosuvastatin], Iodinated contrast media, and Lipitor [atorvastatin]    Current Outpatient Medications   Medication Sig Dispense Refill   • ascorbic acid (ascorbic acid with jason hips) 500 mg tablet Take 500 mg by mouth daily.     • aspirin 81 mg enteric coated tablet Take 1 tablet (81 mg total) by mouth daily. 90 tablet 1   • Lactobacillus acidophilus (PROBIOTIC ORAL) Take 1 capsule by mouth daily.     • MAGNESIUM ORAL Take 30 mg by mouth daily.     • omega-3 fatty acids (FISH OIL CONCENTRATE ORAL) Take by mouth.     • valACYclovir (VALTREX) 1 gram tablet Take two tablets every 12 hours for one day, as needed for outbreak (Patient taking differently: as needed. Take two tablets every 12 hours for one day, as needed for outbreak ) 30 tablet 2   • rosuvastatin (CRESTOR) 20 mg tablet Take 1 tablet (20  mg total) by mouth daily. 90 tablet 1     No current facility-administered medications for this visit.        Social History:   Social History     Socioeconomic History   • Marital status:      Spouse name: None   • Number of children: None   • Years of education: None   • Highest education level: None   Occupational History   • None   Social Needs   • Financial resource strain: None   • Food insecurity     Worry: None     Inability: None   • Transportation needs     Medical: None     Non-medical: None   Tobacco Use   • Smoking status: Former Smoker     Quit date:      Years since quittin.9   • Smokeless tobacco: Never Used   Substance and Sexual Activity   • Alcohol use: Yes     Comment: 10 drinks 2 days per week   • Drug use: Not Currently   • Sexual activity: Yes     Partners: Male   Lifestyle   • Physical activity     Days per week: None     Minutes per session: None   • Stress: None   Relationships   • Social connections     Talks on phone: None     Gets together: None     Attends Restorationist service: None     Active member of club or organization: None     Attends meetings of clubs or organizations: None     Relationship status: None   • Intimate partner violence     Fear of current or ex partner: None     Emotionally abused: None     Physically abused: None     Forced sexual activity: None   Other Topics Concern   • None   Social History Narrative    Do you wear your seatbelt? Yes    Do you have smoke detector in your home? Yes    Do you have a carbon monoxide detector in your home? 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    Current Marital Status?        Family History:   Family History   Problem Relation Age of Onset   • Heart attack Biological Mother    • Hyperlipidemia Biological Mother    • Stroke Biological Mother    • Heart disease Biological Father        Review of Systems   Review of Systems   Constitution: Negative for malaise/fatigue, weight gain and weight loss.    HENT: Negative for hearing loss and hoarse voice.    Eyes: Negative for visual disturbance.   Cardiovascular: Negative for chest pain, claudication, cyanosis, dyspnea on exertion, irregular heartbeat, leg swelling, near-syncope, orthopnea, palpitations, paroxysmal nocturnal dyspnea and syncope.   Respiratory: Negative for cough, hemoptysis, shortness of breath, sleep disturbances due to breathing, snoring, sputum production and wheezing.    Endocrine: Negative for cold intolerance and heat intolerance.   Hematologic/Lymphatic: Negative.  Negative for bleeding problem. Does not bruise/bleed easily.   Skin: Negative.  Negative for rash.   Musculoskeletal: Negative for arthritis, falls, joint pain, muscle cramps and myalgias.   Gastrointestinal: Negative for abdominal pain, anorexia, change in bowel habit, constipation, diarrhea, dysphagia, heartburn, jaundice and nausea.   Genitourinary: Negative for frequency and nocturia.   Neurological: Positive for vertigo. Negative for dizziness, focal weakness, headaches, light-headedness, numbness and tremors.   Psychiatric/Behavioral: Negative for memory loss. The patient does not have insomnia and is not nervous/anxious.    Allergic/Immunologic: Negative for hives.       Objective       Vitals:    12/14/20 1520   BP: 140/90   Pulse: 64   SpO2: 97%       Physical Exam   Constitutional: She is oriented to person, place, and time. She appears well-developed and well-nourished. No distress.   HENT:   Head: Normocephalic and atraumatic.   Nose: Nose normal.   Eyes: Conjunctivae are normal. No scleral icterus.   Neck: No JVD present.   Cardiovascular: Normal rate, regular rhythm, normal heart sounds and intact distal pulses. Exam reveals no gallop and no friction rub.   No murmur heard.  Pulmonary/Chest: Effort normal. No stridor. No respiratory distress. She has no wheezes. She has no rales. She exhibits no tenderness.   Abdominal: There is no abdominal tenderness.    Musculoskeletal:         General: No deformity or edema.   Neurological: She is alert and oriented to person, place, and time.   Skin: Skin is warm and dry.   Psychiatric: She has a normal mood and affect.        Labs   Lab Results   Component Value Date    WBC 5.1 11/13/2020    HGB 13.9 11/13/2020    HCT 41.2 11/13/2020    PLT CANCELED 11/13/2020    CHOL 317 (H) 06/10/2020    TRIG 97 06/10/2020    HDL 85 06/10/2020    ALT 14 11/13/2020    AST 20 11/13/2020     11/13/2020    K 4.7 11/13/2020     11/13/2020    CREATININE 0.87 11/13/2020    BUN 13 11/13/2020    CO2 22 11/13/2020    TSH 1.920 11/13/2020    INR 0.9 02/07/2019    HGBA1C 5.5 06/10/2020     LDL CHOL 179 HDL 67    HDL 39  Small   LDL size 22  HDLC 76  LDL C1 93  LDL particle #2100  Total cholesterol 286                    Imaging    ECHO  Gavin MILD PLAQUE AORTA 11/18 PFO    ECHO 12/20 TTE AMPLATZER DEVICE NO FLOW NL EF        CATH/cv surgery  PFO REPAIR 2/19 AMPLATZER DEVICE DR MARROQUIN    CAT SCAN  Cor garett score zero 2015 thymus tissue    Coronary calcium score August 2019 0    \        ECHO  Gavin 11/18 EF 65%  PFO with left-to-right shunt plaque seen on aorta  agitated saline.       1. Normal left ventricular wall thickness and cavity with preserved systolic function. Estimated EF 65%. No regional wall motion abnormalities.   2. Three cusped aortic valve.  Mild aortic regurgitation.  Normal aortic dimensions. Simple atheroma in the visualized portions of the descending aorta without complex elements.   3. Normal mitral valve.  Trace mitral regurgitation.  Systolic predominance of pulmonary vein flow by spectral doppler.    4. Normal left atrial size. No thrombus or mass seen in the left atrium or left atrial appendage. Left atrial appendage peak emptying velocity 40cm/s.  5. Evidence of small PFO with left to right shunt with agitated saline.       Echo march 2019 aorta 3.9  Ef70% can't exclude smal residual pfo       MRI   mri head  10/18 r cerebellar old infarc  cta brain  left carotid terminus aneurysm 3 mm, chronic lacunar infarct left cerebellum    cta head  3mm anudryeleft carotid terminus  Mild plaque L internal caroitd    mra  no change 3.2 ic aneurysm    ELECTROPHYSIOLOGY   monitor 10 beat svt    ECG normla  sinus rhythm normal sinus rhythm nonspecific anterior T wave inversions no ratliff          Dec 2020                        e       Assessment/Plan     Polygenic hypercholesterolemia  She has LDL cholesterol 213 HDL of 85 she was seen in the lipid clinic and more detailed lipid profile was done it was felt that she did not need to be treated I am concerned because she is atheroma seen on transesophageal echo strong family history of premature cardiac disease her sister recently  she had diffuse coronary artery disease  At this point she did agree to begin statin she had some question of statin intolerance in the past with fatigue we will try Crestor every other day I have a  call him to Dr Brito to discuss this decision    H/O Amplatzer atrial septal defect closure  2019 Amplatzer repair after cryptogenic right posterior cervical stroke echo today 2020 stable    SVT (supraventricular tachycardia) (CMS/HCC)  Burst of SVT in 2018 nothing sustained no clinical issues    PVD (peripheral vascular disease) (CMS/HCC)  JOSUÉ done in 2018 atheroma seen on thoracic aorta coronary calcium score of 0 2019    Family history of premature coronary artery disease  Her sister was just found dead diffuse vascular disease stents thoracic aneurysm 55 years old    Brain aneurysm  3 mm intracerebral aneurysm follows at [place]    Cerebellar infarct (CMS/HCC)  Posterior stroke 2018 work-up revealed PFO repaired Amplatzer device             Follow-up for Amplatzer repair of PFO with cryptogenic stroke in 2019 she also has hyperlipidemia and is followed at [place] for intracerebral aneurysm echo today  stable    There is been some controversy over whether to begin statin on this patient with an LDL cholesterol of 213 because of her favorable lipid profile  She follows in lipid clinic with Dr. Brito  My concern was that there is atheroma seen on transesophageal echo her sister has diffuse vascular disease and recently  in her 50s  She agrees to restart statin she had trouble with fatigue in the past month on Crestor 20 mg every other day and see how she does  Echo today stable she had Amplatzer repair of ASD  Follow-up in 3 months with lab work                    This letter was generated using speech recognition software.  Please excuse any typographical errors.                      This letter was generated using speech recognition software.  Please excuse any typographical errors.    Luis Dumont MD  2020

## 2020-12-14 NOTE — ASSESSMENT & PLAN NOTE
She has LDL cholesterol 213 HDL of 85 she was seen in the lipid clinic and more detailed lipid profile was done it was felt that she did not need to be treated I am concerned because she is atheroma seen on transesophageal echo strong family history of premature cardiac disease her sister recently  she had diffuse coronary artery disease  At this point she did agree to begin statin she had some question of statin intolerance in the past with fatigue we will try Crestor every other day I have a  call him to Dr Brito to discuss this decision

## 2021-03-23 ENCOUNTER — TELEPHONE (OUTPATIENT)
Dept: CARDIOLOGY | Facility: CLINIC | Age: 51
End: 2021-03-23

## 2021-03-23 NOTE — TELEPHONE ENCOUNTER
APPT 3/30/21    LMOM for patient to call me back and let me know if she had any blood work done or not.    Roxanne

## 2021-03-25 LAB
ALBUMIN SERPL-MCNC: 4.7 G/DL (ref 3.8–4.8)
ALBUMIN/GLOB SERPL: 2 {RATIO} (ref 1.2–2.2)
ALP SERPL-CCNC: 57 IU/L (ref 39–117)
ALT SERPL-CCNC: 22 IU/L (ref 0–32)
AST SERPL-CCNC: 24 IU/L (ref 0–40)
BASOPHILS # BLD AUTO: 0 X10E3/UL (ref 0–0.2)
BASOPHILS NFR BLD AUTO: 1 %
BILIRUB SERPL-MCNC: 0.3 MG/DL (ref 0–1.2)
BUN SERPL-MCNC: 15 MG/DL (ref 6–24)
BUN/CREAT SERPL: 18 (ref 9–23)
CALCIUM SERPL-MCNC: 9.6 MG/DL (ref 8.7–10.2)
CHLORIDE SERPL-SCNC: 102 MMOL/L (ref 96–106)
CHOLEST SERPL-MCNC: 204 MG/DL (ref 100–199)
CO2 SERPL-SCNC: 24 MMOL/L (ref 20–29)
CREAT SERPL-MCNC: 0.82 MG/DL (ref 0.57–1)
EOSINOPHIL # BLD AUTO: 0.2 X10E3/UL (ref 0–0.4)
EOSINOPHIL NFR BLD AUTO: 3 %
ERYTHROCYTE [DISTWIDTH] IN BLOOD BY AUTOMATED COUNT: 12.8 % (ref 11.7–15.4)
GLOBULIN SER CALC-MCNC: 2.3 G/DL (ref 1.5–4.5)
GLUCOSE SERPL-MCNC: 103 MG/DL (ref 65–99)
HBA1C MFR BLD: 5.6 % (ref 4.8–5.6)
HCT VFR BLD AUTO: 41 % (ref 34–46.6)
HDLC SERPL-MCNC: 74 MG/DL
HGB BLD-MCNC: 13.5 G/DL (ref 11.1–15.9)
IMM GRANULOCYTES # BLD AUTO: 0 X10E3/UL (ref 0–0.1)
IMM GRANULOCYTES NFR BLD AUTO: 0 %
LAB CORP EGFR IF AFRICN AM: 97 ML/MIN/1.73
LAB CORP EGFR IF NONAFRICN AM: 84 ML/MIN/1.73
LDLC SERPL CALC-MCNC: 117 MG/DL (ref 0–99)
LYMPHOCYTES # BLD AUTO: 3.1 X10E3/UL (ref 0.7–3.1)
LYMPHOCYTES NFR BLD AUTO: 47 %
MCH RBC QN AUTO: 30.3 PG (ref 26.6–33)
MCHC RBC AUTO-ENTMCNC: 32.9 G/DL (ref 31.5–35.7)
MCV RBC AUTO: 92 FL (ref 79–97)
MONOCYTES # BLD AUTO: 0.4 X10E3/UL (ref 0.1–0.9)
MONOCYTES NFR BLD AUTO: 7 %
MORPHOLOGY BLD-IMP: NORMAL
NEUTROPHILS # BLD AUTO: 2.7 X10E3/UL (ref 1.4–7)
NEUTROPHILS NFR BLD AUTO: 42 %
PLATELET # BLD AUTO: NORMAL X10E3/UL
POTASSIUM SERPL-SCNC: 4.5 MMOL/L (ref 3.5–5.2)
PROT SERPL-MCNC: 7 G/DL (ref 6–8.5)
RBC # BLD AUTO: 4.46 X10E6/UL (ref 3.77–5.28)
SODIUM SERPL-SCNC: 138 MMOL/L (ref 134–144)
TRIGL SERPL-MCNC: 72 MG/DL (ref 0–149)
VLDLC SERPL CALC-MCNC: 13 MG/DL (ref 5–40)
WBC # BLD AUTO: 6.4 X10E3/UL (ref 3.4–10.8)

## 2021-03-26 ASSESSMENT — ENCOUNTER SYMPTOMS
ORTHOPNEA: 0
WEIGHT LOSS: 0
MEMORY LOSS: 0
PALPITATIONS: 0
FALLS: 0
VERTIGO: 1
COUGH: 0
MUSCLE CRAMPS: 0
IRREGULAR HEARTBEAT: 0
HEADACHES: 0
HEARTBURN: 0
DIARRHEA: 0
CLAUDICATION: 0
SHORTNESS OF BREATH: 0
SPUTUM PRODUCTION: 0
FOCAL WEAKNESS: 0
TREMORS: 0
WEIGHT GAIN: 0
DYSPNEA ON EXERTION: 0
HEMOPTYSIS: 0
PND: 0
DIZZINESS: 0
CONSTIPATION: 0
CHANGE IN BOWEL HABIT: 0
WHEEZING: 0
HEMATOLOGIC/LYMPHATIC NEGATIVE: 1
LIGHT-HEADEDNESS: 0
NUMBNESS: 0
BRUISES/BLEEDS EASILY: 0
FREQUENCY: 0
INSOMNIA: 0
HOARSE VOICE: 0
ABDOMINAL PAIN: 0
SNORING: 0
NAUSEA: 0
MYALGIAS: 0
NEAR-SYNCOPE: 0
NERVOUS/ANXIOUS: 0
SLEEP DISTURBANCES DUE TO BREATHING: 0
JAUNDICE: 0
SYNCOPE: 0
ANOREXIA: 0

## 2021-03-26 NOTE — PROGRESS NOTES
Cardiology Consult/New Patient    Zelda Navarro MD          Orly Lara is a 51 y.o. female identifies as who presents with ']]]]]]      She returns for follow-up and review her lab work  She has familial hyperlipidemia with a baseline LDL cholesterol of 200  She has  a diagnosis of subclinical plaque  Transesophageal echo in the past that showed atheroma   She had a a calcium score of 0 in 2019'  She has a strong family history of premature cardiac disease her sister recently  was found to have diffuse vascular disease at age 55  She herself also has a brain aneurysm 3 mm and follows at [place]    she was seen in advanced lipid clinic in the past  She can be managed without statin therapy  I met her again brought up statins and she did agree to begin them  .  She thought she felt tired on statins but she did agree to give them a try to start them every other day    She has a history of ASD with Amplatzer device repair 2019  This was discovered when she had a posterior stroke in 2018 work-up found a PFO     she also has a dilated aortic root 4.1 cm last imaged 2020 with echo an aortic root index 2.2 cm/m² or CT angiogram at this visit  She said her blood pressure has been running a little high at home up to 130/90  And she is aortic root and intracerebral aneurysm she has been on hydrochlorothiazide in the past would like to try that again  She has been taking Crestor every other day and tolerating it well will increase to daily              Lab work 2021  Glucose 103  Creatinine 0.82 potassium 4.5  Cholesterol 204 down from 317  HDL 74   down from 213  Liver function tests normal                             Ref Range & Units 6/10/20 0911 Comments     Cholesterol, Total 100 - 199 mg/dL 317High       Triglycerides 0 - 149 mg/dL 97      HDL Cholesterol >39 mg/dL 85      VLDL Cholesterol Sahil 5 - 40 mg/dL 19      LDL Cholesterol Calc 0 - 99 mg/dL 213High        Gluc  109      Instructions                                                        Patient Active Problem List    Diagnosis Date Noted   • H/O Amplatzer atrial septal defect closure 12/14/2020   • Family history of premature coronary artery disease 12/14/2020   • Statin intolerance 06/10/2020   • Polygenic hypercholesterolemia 08/16/2019   • Hyperglycemia 08/02/2019   • Dilated aortic root (CMS/HCC) 08/02/2019   • PVD (peripheral vascular disease) (CMS/HCC) 08/02/2019   • Cerebellar infarct (CMS/HCC) 05/23/2019   • Brain aneurysm 05/23/2019   • SVT (supraventricular tachycardia) (CMS/[facility]) 12/14/2018   • PFO (patent foramen ovale) 11/30/2018   • Stroke (CMS/HCC) 11/08/2018   • Vertigo 10/30/2018   • Vestibular disequilibrium, left 08/05/2016       Medical History:   Past Medical History:   Diagnosis Date   • Aneurysm (CMS/HCC)    • Hypertension    • Lipid disorder    • Positional vertigo    • Stroke (CMS/HCC)        Surgical History:   Past Surgical History:   Procedure Laterality Date   • HYSTEROSCOPY W/ ENDOMETRIAL ABLATION     • PATENT FORAMEN OVALE CLOSURE         Allergies: Crestor [rosuvastatin], Iodinated contrast media, and Lipitor [atorvastatin]    Current Outpatient Medications   Medication Sig Dispense Refill   • ascorbic acid (ascorbic acid with jason hips) 500 mg tablet Take 500 mg by mouth daily.     • aspirin 81 mg enteric coated tablet Take 1 tablet (81 mg total) by mouth daily. 90 tablet 1   • Lactobacillus acidophilus (PROBIOTIC ORAL) Take 1 capsule by mouth daily.     • MAGNESIUM ORAL Take 30 mg by mouth daily.     • omega-3 fatty acids (FISH OIL CONCENTRATE ORAL) Take by mouth.     • rosuvastatin (CRESTOR) 20 mg tablet Take 1 tablet (20 mg total) by mouth daily. 90 tablet 1   • diphenhydrAMINE (BENADRYL) 50 mg capsule Take 1 capsule (50 mg total) by mouth once for 1 dose. Take 1 tab 1 hr before exam (with last dose of prednisone). 1 capsule 0   • hydrochlorothiazide (HYDRODIURIL) 25 mg tablet Take 1  tablet (25 mg total) by mouth daily. 90 tablet 1   • predniSONE (DELTASONE) 20 mg tablet Take 2 tablets (40 mg total) by mouth every 6 (six) hours for 3 doses. Take 2 tab 13 hrs, 2 tab 7 hours, and 2 tab 1 hr before exam. 6 tablet 0     No current facility-administered medications for this visit.        Social History:   Social History     Socioeconomic History   • Marital status:      Spouse name: None   • Number of children: None   • Years of education: None   • Highest education level: None   Occupational History   • None   Social Needs   • Financial resource strain: None   • Food insecurity     Worry: None     Inability: None   • Transportation needs     Medical: None     Non-medical: None   Tobacco Use   • Smoking status: Former Smoker     Quit date: 2006     Years since quitting: 15.2   • Smokeless tobacco: Never Used   Substance and Sexual Activity   • Alcohol use: Yes     Comment: 10 drinks 2 days per week   • Drug use: Not Currently   • Sexual activity: Yes     Partners: Male   Lifestyle   • Physical activity     Days per week: None     Minutes per session: None   • Stress: None   Relationships   • Social connections     Talks on phone: None     Gets together: None     Attends Druze service: None     Active member of club or organization: None     Attends meetings of clubs or organizations: None     Relationship status: None   • Intimate partner violence     Fear of current or ex partner: None     Emotionally abused: None     Physically abused: None     Forced sexual activity: None   Other Topics Concern   • None   Social History Narrative    Do you wear your seatbelt? Yes    Do you have smoke detector in your home? Yes    Do you have a carbon monoxide detector in your home? 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    Current Marital Status?        Family History:   Family History   Problem Relation Age of Onset   • Heart attack Biological Mother    • Hyperlipidemia Biological  Mother    • Stroke Biological Mother    • Heart disease Biological Father        Review of Systems   Review of Systems   Constitution: Negative for malaise/fatigue, weight gain and weight loss.   HENT: Negative for hearing loss and hoarse voice.    Eyes: Negative for visual disturbance.   Cardiovascular: Negative for chest pain, claudication, cyanosis, dyspnea on exertion, irregular heartbeat, leg swelling, near-syncope, orthopnea, palpitations, paroxysmal nocturnal dyspnea and syncope.   Respiratory: Negative for cough, hemoptysis, shortness of breath, sleep disturbances due to breathing, snoring, sputum production and wheezing.    Endocrine: Negative for cold intolerance and heat intolerance.   Hematologic/Lymphatic: Negative.  Negative for bleeding problem. Does not bruise/bleed easily.   Skin: Negative.  Negative for rash.   Musculoskeletal: Negative for arthritis, falls, joint pain, muscle cramps and myalgias.   Gastrointestinal: Negative for abdominal pain, anorexia, change in bowel habit, constipation, diarrhea, dysphagia, heartburn, jaundice and nausea.   Genitourinary: Negative for frequency and nocturia.   Neurological: Positive for vertigo. Negative for dizziness, focal weakness, headaches, light-headedness, numbness and tremors.   Psychiatric/Behavioral: Negative for memory loss. The patient does not have insomnia and is not nervous/anxious.    Allergic/Immunologic: Negative for hives.       Objective       Vitals:    03/30/21 1535   BP: 128/86   Pulse: 65   Resp: 18   SpO2: 98%       Physical Exam   Constitutional: She is oriented to person, place, and time. She appears well-developed and well-nourished. No distress.   HENT:   Head: Normocephalic and atraumatic.   Nose: Nose normal.   Eyes: Conjunctivae are normal. No scleral icterus.   Neck: No JVD present.   Cardiovascular: Normal rate, regular rhythm, normal heart sounds and intact distal pulses. Exam reveals no gallop and no friction rub.   No  murmur heard.  Pulmonary/Chest: Effort normal. No stridor. No respiratory distress. She has no wheezes. She has no rales. She exhibits no tenderness.   Abdominal: There is no abdominal tenderness.   Musculoskeletal:         General: No deformity or edema.   Neurological: She is alert and oriented to person, place, and time.   Skin: Skin is warm and dry.   Psychiatric: She has a normal mood and affect.        Labs   Lab Results   Component Value Date    WBC 6.4 03/24/2021    HGB 13.5 03/24/2021    HCT 41.0 03/24/2021    PLT CANCELED 03/24/2021    CHOL 204 (H) 03/24/2021    TRIG 72 03/24/2021    HDL 74 03/24/2021    ALT 22 03/24/2021    AST 24 03/24/2021     03/24/2021    K 4.5 03/24/2021     03/24/2021    CREATININE 0.82 03/24/2021    BUN 15 03/24/2021    CO2 24 03/24/2021    TSH 1.920 11/13/2020    INR 0.9 02/07/2019    HGBA1C 5.6 03/24/2021     LDL CHOL 179 HDL 67    HDL 39  Small   LDL size 22  HDLC 76  LDL C1 93  LDL particle #2100  Total cholesterol 286                    Imaging    ECHO  Gavin MILD PLAQUE AORTA 11/18 PFO    ECHO 12/20 TTE AMPLATZER DEVICE NO FLOW NL EF        CATH/cv surgery  PFO REPAIR 2/19 AMPLATZER DEVICE DR MARROQUIN    CAT SCAN  Cor garett score zero 2015 thymus tissue aorta normal    Coronary calcium score August 2019 zero    cta 4/2021 aorta 4.0 cm    \        ECHO  Gavin 11/18 EF 65%  PFO with left-to-right shunt plaque seen on aorta  agitated saline.       1. Normal left ventricular wall thickness and cavity with preserved systolic function. Estimated EF 65%. No regional wall motion abnormalities.   2. Three cusped aortic valve.  Mild aortic regurgitation.  Normal aortic dimensions. Simple atheroma in the visualized portions of the descending aorta without complex elements.   3. Normal mitral valve.  Trace mitral regurgitation.  Systolic predominance of pulmonary vein flow by spectral doppler.    4. Normal left atrial size. No thrombus or mass seen in the left atrium or  left atrial appendage. Left atrial appendage peak emptying velocity 40cm/s.  5. Evidence of small PFO with left to right shunt with agitated saline.       Echo 2019 aorta 3.9  Ef70% can't exclude smal residual pfo  Aortic root four-point 1 aortic root index 2.2 cm/m²       MRI   mri head 10/18 r cerebellar old infarc  cta brain  left carotid terminus aneurysm 3 mm, chronic lacunar infarct left cerebellum    cta head  3mm anudryeleft carotid terminus  Mild plaque L internal caroitd    mra  no change 3.2 ic aneurysm    ELECTROPHYSIOLOGY   monitor 10 beat svt    ECG normla  sinus rhythm normal sinus rhythm nonspecific anterior T wave inversions no ratliff          2021 poor r wave no hcange    Dec 2020                        e       Assessment/Plan     H/O Amplatzer atrial septal defect closure   after cryptogenic posterior stroke is found to have patent foramen ovale he had Amplatzer device and repair    Cerebellar infarct (CMS/HCC)  Related to PFO repair  Amplatzer device posterior stroke 2000    Polygenic hypercholesterolemia  She has familial hyperlipidemia baseline LDL cholesterol over 200 she is concerned about statin intolerance doing well with 20 every other day LDL cholesterol dropped from 200-1 12 increase to 20 mg daily she had coronary calcium score of 0 in 2019 but she did have some plaque seen on thoracic aorta with plaque noted on that study 2018    PVD (peripheral vascular disease) (CMS/HCC)  Thoracic aortic plaque seen with JOSUÉ 2018 on statin    Family history of premature coronary artery disease  Sister  age 50    Brain aneurysm  3 mm intracerebral carotid aneurysm follows at [place]    SVT (supraventricular tachycardia) (CMS/HCC)  Monitor showed nonsustained burst back in 2018 no clinical recurrence    Dilated aortic root (CMS/HCC)  Mildly dilated on echo 3.9 normal aortic mentioned on coronary calcium score 2019 plan CTA thoracic aorta                        She is a history of cryptogenic stroke in February 2019 and had a PFO had Amplatzer repair of the defect  She has familial hyperlipidemia started on rosuvastatin tolerating low-dose  Increase to 20 mg daily  Has intracerebral aneurysm being followed by neurology  Mildly dilated aortic root on echocardiogram follow-up with CTA prior to next visit    She tolerates Crestor every other day increase to daily her LDL cholesterol dropped from 200 114  Blood pressures been mildly elevated with her diagnosis of mildly dull aortic root and intracerebral aneurysm add hydrochlorothiazide she has been on that the past when to go back to that medication  Follow-up in 6 months have CTA of thoracic aorta is mildly dilated on echocardiogram the past  Follow-up in 6 months with visit and echocardiogram                    This letter was generated using speech recognition software.  Please excuse any typographical errors.                      This letter was generated using speech recognition software.  Please excuse any typographical errors.    Luis Dumont MD  3/30/2021

## 2021-03-30 ENCOUNTER — OFFICE VISIT (OUTPATIENT)
Dept: CARDIOLOGY | Facility: CLINIC | Age: 51
End: 2021-03-30
Payer: COMMERCIAL

## 2021-03-30 VITALS
RESPIRATION RATE: 18 BRPM | HEART RATE: 65 BPM | BODY MASS INDEX: 31.89 KG/M2 | DIASTOLIC BLOOD PRESSURE: 86 MMHG | WEIGHT: 180 LBS | SYSTOLIC BLOOD PRESSURE: 128 MMHG | OXYGEN SATURATION: 98 % | HEIGHT: 63 IN

## 2021-03-30 DIAGNOSIS — R73.9 HYPERGLYCEMIA: ICD-10-CM

## 2021-03-30 DIAGNOSIS — I63.441 CEREBROVASCULAR ACCIDENT (CVA) DUE TO EMBOLISM OF RIGHT CEREBELLAR ARTERY (CMS/HCC): Primary | ICD-10-CM

## 2021-03-30 DIAGNOSIS — Z87.74 H/O AMPLATZER ATRIAL SEPTAL DEFECT CLOSURE: ICD-10-CM

## 2021-03-30 DIAGNOSIS — Q21.12 PFO (PATENT FORAMEN OVALE): ICD-10-CM

## 2021-03-30 DIAGNOSIS — I67.1 BRAIN ANEURYSM: ICD-10-CM

## 2021-03-30 DIAGNOSIS — I77.810 DILATED AORTIC ROOT (CMS/HCC): ICD-10-CM

## 2021-03-30 DIAGNOSIS — E78.00 POLYGENIC HYPERCHOLESTEROLEMIA: ICD-10-CM

## 2021-03-30 PROCEDURE — 99214 OFFICE O/P EST MOD 30 MIN: CPT | Performed by: INTERNAL MEDICINE

## 2021-03-30 PROCEDURE — 93000 ELECTROCARDIOGRAM COMPLETE: CPT | Performed by: INTERNAL MEDICINE

## 2021-03-30 RX ORDER — HYDROCHLOROTHIAZIDE 25 MG/1
25 TABLET ORAL DAILY
Qty: 90 TABLET | Refills: 1 | Status: SHIPPED | OUTPATIENT
Start: 2021-03-30 | End: 2021-11-01

## 2021-03-30 RX ORDER — PREDNISONE 20 MG/1
40 TABLET ORAL EVERY 6 HOURS
Qty: 6 TABLET | Refills: 0 | Status: SHIPPED | OUTPATIENT
Start: 2021-03-30 | End: 2021-03-31 | Stop reason: SDUPTHER

## 2021-03-30 RX ORDER — DIPHENHYDRAMINE HCL 50 MG
50 CAPSULE ORAL ONCE
Qty: 1 CAPSULE | Refills: 0 | Status: SHIPPED | OUTPATIENT
Start: 2021-03-30 | End: 2021-03-30

## 2021-03-30 ASSESSMENT — PAIN SCALES - GENERAL: PAINLEVEL: 0-NO PAIN

## 2021-03-30 NOTE — ASSESSMENT & PLAN NOTE
Mildly dilated on echo 3.9 normal aortic mentioned on coronary calcium score 2019 plan CTA thoracic aorta

## 2021-03-30 NOTE — ASSESSMENT & PLAN NOTE
She has familial hyperlipidemia baseline LDL cholesterol over 200 she is concerned about statin intolerance doing well with 20 every other day LDL cholesterol dropped from 200-1 12 increase to 20 mg daily she had coronary calcium score of 0 in 2019 but she did have some plaque seen on thoracic aorta with plaque noted on that study 2018

## 2021-03-30 NOTE — ASSESSMENT & PLAN NOTE
2019 after cryptogenic posterior stroke is found to have patent foramen ovale he had Amplatzer device and repair

## 2021-03-31 ENCOUNTER — TELEPHONE (OUTPATIENT)
Dept: SCHEDULING | Facility: CLINIC | Age: 51
End: 2021-03-31

## 2021-03-31 DIAGNOSIS — Z88.8 ALLERGY TO IODINE: Primary | ICD-10-CM

## 2021-03-31 RX ORDER — ROSUVASTATIN CALCIUM 20 MG/1
TABLET, COATED ORAL
Qty: 90 TABLET | Refills: 3 | Status: SHIPPED | OUTPATIENT
Start: 2021-03-31 | End: 2022-01-11 | Stop reason: SDUPTHER

## 2021-03-31 RX ORDER — DIPHENHYDRAMINE HCL 25 MG
50 TABLET ORAL ONCE
Status: SHIPPED | OUTPATIENT
Start: 2021-03-31 | End: 2021-04-01

## 2021-03-31 RX ORDER — PREDNISONE 20 MG/1
40 TABLET ORAL EVERY 6 HOURS
Qty: 6 TABLET | Refills: 0 | Status: SHIPPED | OUTPATIENT
Start: 2021-03-31 | End: 2021-12-02

## 2021-03-31 NOTE — TELEPHONE ENCOUNTER
DR WAGNER pt--requesting BENADRYL and PREDNISONE--    Not a cardiac med--per protocol not escribed    Please advise    Thank you

## 2021-03-31 NOTE — TELEPHONE ENCOUNTER
Medicine Refill Request    Last Office Visit: Visit date not found  Last Telemedicine Visit: Visit date not found    Next Office Visit: Visit date not found  Next Telemedicine Visit: Visit date not found     predniSONE (DELTASONE) 20 mg    Pt stated she was  Prescribed 1 dose bendrayl but medication is not on med list    If pt needs bendrayl please send to [facility] pharmacy on file or confirm if she can buy over the counter; pt unsure    Pt can be reached at [phone]      Current Outpatient Medications:   •  ascorbic acid (ascorbic acid with jason hips) 500 mg tablet, Take 500 mg by mouth daily., Disp: , Rfl:   •  aspirin 81 mg enteric coated tablet, Take 1 tablet (81 mg total) by mouth daily., Disp: 90 tablet, Rfl: 1  •  hydrochlorothiazide (HYDRODIURIL) 25 mg tablet, Take 1 tablet (25 mg total) by mouth daily., Disp: 90 tablet, Rfl: 1  •  Lactobacillus acidophilus (PROBIOTIC ORAL), Take 1 capsule by mouth daily., Disp: , Rfl:   •  MAGNESIUM ORAL, Take 30 mg by mouth daily., Disp: , Rfl:   •  omega-3 fatty acids (FISH OIL CONCENTRATE ORAL), Take by mouth., Disp: , Rfl:   •  predniSONE (DELTASONE) 20 mg tablet, Take 2 tablets (40 mg total) by mouth every 6 (six) hours for 3 doses. Take 2 tab 13 hrs, 2 tab 7 hours, and 2 tab 1 hr before exam., Disp: 6 tablet, Rfl: 0  •  rosuvastatin (CRESTOR) 20 mg tablet, TAKE ONE TABLET (20 MG TOTAL) BY MOUTH DAILY., Disp: 90 tablet, Rfl: 3      BP Readings from Last 3 Encounters:   03/30/21 128/86   12/14/20 125/90   12/14/20 140/90       Recent Lab results:  Lab Results   Component Value Date    CHOL 204 (H) 03/24/2021   ,   Lab Results   Component Value Date    HDL 74 03/24/2021   ,   Lab Results   Component Value Date    LDLCALC 117 (H) 03/24/2021   ,   Lab Results   Component Value Date    TRIG 72 03/24/2021        Lab Results   Component Value Date    GLUCOSE 103 (H) 03/24/2021   ,   Lab Results   Component Value Date    HGBA1C 5.6 03/24/2021         Lab Results   Component  Value Date    CREATININE 0.82 03/24/2021       Lab Results   Component Value Date    TSH 1.920 11/13/2020

## 2021-03-31 NOTE — TELEPHONE ENCOUNTER
Called and spoke to Orly.  Benadryl order sent to your [facility] with prednisone order.  Take benadryl 1 hour prior to test with last dose of prednisone.  She understands.

## 2021-04-01 RX ORDER — VALACYCLOVIR HYDROCHLORIDE 1 G/1
2000 TABLET, FILM COATED ORAL 2 TIMES DAILY
Qty: 30 TABLET | Refills: 0 | Status: SHIPPED | OUTPATIENT
Start: 2021-04-01 | End: 2022-02-23 | Stop reason: SDUPTHER

## 2021-04-05 ENCOUNTER — HOSPITAL ENCOUNTER (OUTPATIENT)
Dept: RADIOLOGY | Facility: HOSPITAL | Age: 51
Discharge: HOME | End: 2021-04-05
Attending: INTERNAL MEDICINE
Payer: COMMERCIAL

## 2021-04-05 ENCOUNTER — TELEPHONE (OUTPATIENT)
Dept: CARDIOLOGY | Facility: CLINIC | Age: 51
End: 2021-04-05

## 2021-04-05 DIAGNOSIS — I77.810 DILATED AORTIC ROOT (CMS/HCC): ICD-10-CM

## 2021-04-05 PROCEDURE — 63600105 HC IODINE BASED CONTRAST: Performed by: INTERNAL MEDICINE

## 2021-04-05 PROCEDURE — G1004 CDSM NDSC: HCPCS

## 2021-04-05 RX ADMIN — IOHEXOL 120 ML: 300 INJECTION, SOLUTION INTRAVENOUS at 10:42

## 2021-09-21 PROBLEM — I63.9 STROKE (CMS/HCC): Status: RESOLVED | Noted: 2018-11-08 | Resolved: 2021-09-21

## 2021-10-20 ENCOUNTER — TELEPHONE (OUTPATIENT)
Dept: SCHEDULING | Facility: CLINIC | Age: 51
End: 2021-10-20

## 2021-10-20 NOTE — TELEPHONE ENCOUNTER
Pt calling to reschedule her previously [facility] echo and OV.  Unable to locate two appts together at the [facility].  Pt can be reached at [phone].

## 2021-10-27 ENCOUNTER — APPOINTMENT (RX ONLY)
Dept: URBAN - METROPOLITAN AREA CLINIC 26 | Facility: CLINIC | Age: 51
Setting detail: DERMATOLOGY
End: 2021-10-27

## 2021-10-27 DIAGNOSIS — D22 MELANOCYTIC NEVI: ICD-10-CM

## 2021-10-27 DIAGNOSIS — L81.3 CAFÉ AU LAIT SPOTS: ICD-10-CM

## 2021-10-27 DIAGNOSIS — L57.8 OTHER SKIN CHANGES DUE TO CHRONIC EXPOSURE TO NONIONIZING RADIATION: ICD-10-CM

## 2021-10-27 DIAGNOSIS — D18.0 HEMANGIOMA: ICD-10-CM

## 2021-10-27 DIAGNOSIS — L81.4 OTHER MELANIN HYPERPIGMENTATION: ICD-10-CM

## 2021-10-27 DIAGNOSIS — L82.1 OTHER SEBORRHEIC KERATOSIS: ICD-10-CM

## 2021-10-27 DIAGNOSIS — L56.8 OTHER SPECIFIED ACUTE SKIN CHANGES DUE TO ULTRAVIOLET RADIATION: ICD-10-CM

## 2021-10-27 PROBLEM — D22.5 MELANOCYTIC NEVI OF TRUNK: Status: ACTIVE | Noted: 2021-10-27

## 2021-10-27 PROBLEM — D22.72 MELANOCYTIC NEVI OF LEFT LOWER LIMB, INCLUDING HIP: Status: ACTIVE | Noted: 2021-10-27

## 2021-10-27 PROBLEM — D18.01 HEMANGIOMA OF SKIN AND SUBCUTANEOUS TISSUE: Status: ACTIVE | Noted: 2021-10-27

## 2021-10-27 PROBLEM — D22.39 MELANOCYTIC NEVI OF OTHER PARTS OF FACE: Status: ACTIVE | Noted: 2021-10-27

## 2021-10-27 PROCEDURE — ? FULL BODY SKIN EXAM

## 2021-10-27 PROCEDURE — ? ADDITIONAL NOTES

## 2021-10-27 PROCEDURE — 99203 OFFICE O/P NEW LOW 30 MIN: CPT

## 2021-10-27 PROCEDURE — ? PATIENT SPECIFIC COUNSELING

## 2021-10-27 PROCEDURE — ? SUNSCREEN RECOMMENDATIONS

## 2021-10-27 PROCEDURE — ? COUNSELING

## 2021-10-27 ASSESSMENT — LOCATION SIMPLE DESCRIPTION DERM
LOCATION SIMPLE: LEFT CHEEK
LOCATION SIMPLE: LEFT LOWER BACK
LOCATION SIMPLE: RIGHT FOREARM
LOCATION SIMPLE: LEFT POSTERIOR THIGH
LOCATION SIMPLE: UPPER BACK
LOCATION SIMPLE: LEFT ZYGOMA

## 2021-10-27 ASSESSMENT — LOCATION DETAILED DESCRIPTION DERM
LOCATION DETAILED: LEFT CENTRAL ZYGOMA
LOCATION DETAILED: INFERIOR THORACIC SPINE
LOCATION DETAILED: LEFT PROXIMAL POSTERIOR THIGH
LOCATION DETAILED: LEFT SUPERIOR MEDIAL LOWER BACK
LOCATION DETAILED: LEFT SUPERIOR CENTRAL BUCCAL CHEEK
LOCATION DETAILED: SUPERIOR THORACIC SPINE
LOCATION DETAILED: RIGHT PROXIMAL DORSAL FOREARM

## 2021-10-27 ASSESSMENT — LOCATION ZONE DERM
LOCATION ZONE: LEG
LOCATION ZONE: FACE
LOCATION ZONE: ARM
LOCATION ZONE: TRUNK

## 2021-11-26 ASSESSMENT — ENCOUNTER SYMPTOMS
DIARRHEA: 0
FOCAL WEAKNESS: 0
IRREGULAR HEARTBEAT: 0
FALLS: 0
ANOREXIA: 0
SLEEP DISTURBANCES DUE TO BREATHING: 0
ORTHOPNEA: 0
SNORING: 0
WEIGHT GAIN: 0
MYALGIAS: 0
CONSTIPATION: 0
ABDOMINAL PAIN: 0
NUMBNESS: 0
VERTIGO: 1
SYNCOPE: 0
INSOMNIA: 0
LIGHT-HEADEDNESS: 0
COUGH: 0
SPUTUM PRODUCTION: 0
MEMORY LOSS: 0
PND: 0
FREQUENCY: 0
DYSPNEA ON EXERTION: 0
WHEEZING: 0
HOARSE VOICE: 0
NEAR-SYNCOPE: 0
HEARTBURN: 0
HEMATOLOGIC/LYMPHATIC NEGATIVE: 1
NAUSEA: 0
JAUNDICE: 0
SHORTNESS OF BREATH: 0
CHANGE IN BOWEL HABIT: 0
TREMORS: 0
PALPITATIONS: 0
HEMOPTYSIS: 0
NERVOUS/ANXIOUS: 0
CLAUDICATION: 0
MUSCLE CRAMPS: 0
DIZZINESS: 0
WEIGHT LOSS: 0
HEADACHES: 0
BRUISES/BLEEDS EASILY: 0

## 2021-11-26 NOTE — PROGRESS NOTES
Seth Montemayor MD          Orly Lara is a 51 y.o. female She returns for follow-up and echocardiogram  She had repair of PFO with Amplatzer device she had cryptogenic posterior stroke in 2019 cerebellar infarction  She has familial hyperlipidemia  Coronary calcium score of 0, and 2019 but arteriosclerosis was diagnosed with plaque in her thoracic aorta identified with transesophageal echo in 2018  She has dilated aortic root CTA performed April 2021 4.0 cm  Echocardiogram today December 2021  Amplatzer atrial septum device no evidence of shunt normal LV systolic function trace MR aortic root 4.0 cm      I decreased her statin due to some intolerance   I am not happy with her cholesterol LDL cholesterol is147   without therapy her LDL was 213  .  She reluctantly agreed to add Zetia to her rosuvastatin             Ref Range & Units 6/10/20 0911 Comments     Cholesterol, Total 100 - 199 mg/dL 317High       Triglycerides 0 - 149 mg/dL 97      HDL Cholesterol >39 mg/dL 85      VLDL Cholesterol Sahil 5 - 40 mg/dL 19      LDL Cholesterol Calc 0 - 99 mg/dL 213High        Gluc 109      Instructions                                                        Patient Active Problem List    Diagnosis Date Noted   • H/O Amplatzer atrial septal defect closure 12/14/2020   • Family history of premature coronary artery disease 12/14/2020   • Statin intolerance 06/10/2020   • Polygenic hypercholesterolemia 08/16/2019   • Hyperglycemia 08/02/2019   • Dilated aortic root (CMS/HCC) 08/02/2019   • PVD (peripheral vascular disease) (CMS/[facility]) 08/02/2019   • Cerebellar infarct (CMS/[facility]) 05/23/2019   • Brain aneurysm 05/23/2019   • SVT (supraventricular tachycardia) (CMS/[facility]) 12/14/2018   • PFO (patent foramen ovale) 11/30/2018   • Vestibular disequilibrium, left 08/05/2016       Medical History:   Past Medical History:   Diagnosis Date   • Aneurysm (CMS/[facility])    • Hypertension    • Lipid disorder    • Positional vertigo    • Stroke  (CMS/[facility])        Surgical History:   Past Surgical History:   Procedure Laterality Date   • HYSTEROSCOPY W/ ENDOMETRIAL ABLATION     • PATENT FORAMEN OVALE CLOSURE         Allergies: Crestor [rosuvastatin], Iodinated contrast media, and Lipitor [atorvastatin]    Current Outpatient Medications   Medication Sig Dispense Refill   • ascorbic acid (ascorbic acid with jason hips) 500 mg tablet Take 500 mg by mouth daily.     • aspirin 81 mg enteric coated tablet Take 1 tablet (81 mg total) by mouth daily. 90 tablet 1   • hydrochlorothiazide 25 mg tablet TAKE ONE TABLET (25 MG TOTAL) BY MOUTH DAILY. 90 tablet 2   • Lactobacillus acidophilus (PROBIOTIC ORAL) Take 1 capsule by mouth daily.     • MAGNESIUM ORAL Take 30 mg by mouth daily.     • omega-3 fatty acids (FISH OIL CONCENTRATE ORAL) Take by mouth.     • rosuvastatin (CRESTOR) 20 mg tablet TAKE ONE TABLET (20 MG TOTAL) BY MOUTH DAILY. 90 tablet 3   • valACYclovir (VALTREX) 1 gram tablet Take 2 tablets (2,000 mg total) by mouth 2 (two) times a day for 2 doses. 30 tablet 0   • ezetimibe 10 mg tablet Take 1 tablet (10 mg total) by mouth nightly. 90 tablet 1     No current facility-administered medications for this visit.       Social History:   Social History     Socioeconomic History   • Marital status:      Spouse name: None   • Number of children: None   • Years of education: None   • Highest education level: None   Occupational History   • None   Tobacco Use   • Smoking status: Former Smoker     Quit date: 2006     Years since quitting: 15.9   • Smokeless tobacco: Never Used   Vaping Use   • Vaping Use: Never used   Substance and Sexual Activity   • Alcohol use: Yes     Comment: 10 drinks 2 days per week   • Drug use: Not Currently   • Sexual activity: Yes     Partners: Male   Other Topics Concern   • None   Social History Narrative    Do you wear your seatbelt? Yes    Do you have smoke detector in your home? Yes    Do you have a carbon monoxide detector in your  home? 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    Current Marital Status?      Social Determinants of Health     Financial Resource Strain:    • Difficulty of Paying Living Expenses: Not on file   Food Insecurity:    • Worried About Running Out of Food in the Last Year: Not on file   • Ran Out of Food in the Last Year: Not on file   Transportation Needs:    • Lack of Transportation (Medical): Not on file   • Lack of Transportation (Non-Medical): Not on file   Physical Activity:    • Days of Exercise per Week: Not on file   • Minutes of Exercise per Session: Not on file   Stress:    • Feeling of Stress : Not on file   Social Connections:    • Frequency of Communication with Friends and Family: Not on file   • Frequency of Social Gatherings with Friends and Family: Not on file   • Attends [employer] Services: Not on file   • Active Member of Clubs or Organizations: Not on file   • Attends Club or Organization Meetings: Not on file   • Marital Status: Not on file   Intimate Partner Violence:    • Fear of Current or Ex-Partner: Not on file   • Emotionally Abused: Not on file   • Physically Abused: Not on file   • Sexually Abused: Not on file   Housing Stability:    • Unable to Pay for Housing in the Last Year: Not on file   • Number of Places Lived in the Last Year: Not on file   • Unstable Housing in the Last Year: Not on file       Family History:   Family History   Problem Relation Age of Onset   • Heart attack Biological Mother    • Hyperlipidemia Biological Mother    • Stroke Biological Mother    • Heart disease Biological Father        Review of Systems   Review of Systems   Constitutional: Negative for malaise/fatigue, weight gain and weight loss.   HENT: Negative for hearing loss and hoarse voice.    Eyes: Negative for visual disturbance.   Cardiovascular: Negative for chest pain, claudication, cyanosis, dyspnea on exertion, irregular heartbeat, leg swelling, near-syncope, orthopnea,  palpitations, paroxysmal nocturnal dyspnea and syncope.   Respiratory: Negative for cough, hemoptysis, shortness of breath, sleep disturbances due to breathing, snoring, sputum production and wheezing.    Endocrine: Negative for cold intolerance and heat intolerance.   Hematologic/Lymphatic: Negative.  Negative for bleeding problem. Does not bruise/bleed easily.   Skin: Negative.  Negative for rash.   Musculoskeletal: Negative for arthritis, falls, joint pain, muscle cramps and myalgias.   Gastrointestinal: Negative for abdominal pain, anorexia, change in bowel habit, constipation, diarrhea, dysphagia, heartburn, jaundice and nausea.   Genitourinary: Negative for frequency and nocturia.   Neurological: Positive for vertigo. Negative for dizziness, focal weakness, headaches, light-headedness, numbness and tremors.   Psychiatric/Behavioral: Negative for memory loss. The patient does not have insomnia and is not nervous/anxious.    Allergic/Immunologic: Negative for hives.       Objective       Vitals:    12/02/21 1207   BP: 136/86   Pulse: (!) 58   SpO2: 98%       Physical Exam  Constitutional:       General: She is not in acute distress.     Appearance: She is well-developed.   HENT:      Head: Normocephalic and atraumatic.      Nose: Nose normal.   Eyes:      General: No scleral icterus.     Conjunctiva/sclera: Conjunctivae normal.   Neck:      Vascular: No JVD.   Cardiovascular:      Rate and Rhythm: Normal rate and regular rhythm.      Pulses: Intact distal pulses.      Heart sounds: Normal heart sounds. No murmur heard.  No friction rub. No gallop.    Pulmonary:      Effort: Pulmonary effort is normal. No respiratory distress.      Breath sounds: No stridor. No wheezing or rales.   Chest:      Chest wall: No tenderness.   Abdominal:      Tenderness: There is no abdominal tenderness.   Musculoskeletal:         General: No deformity.   Skin:     General: Skin is warm and dry.   Neurological:      Mental Status: She  is alert and oriented to person, place, and time.          Labs   Lab Results   Component Value Date    WBC 5.6 11/30/2021    HGB 13.3 11/30/2021    HCT 39.6 11/30/2021    PLT CANCELED 11/30/2021    CHOL 243 (H) 11/30/2021    TRIG 94 11/30/2021    HDL 80 11/30/2021    ALT 35 (H) 11/30/2021    AST 30 11/30/2021     11/30/2021    K 4.5 11/30/2021     11/30/2021    CREATININE 0.86 11/30/2021    BUN 17 11/30/2021    CO2 25 11/30/2021    TSH 1.920 11/13/2020    INR 0.9 02/07/2019    HGBA1C 5.6 03/24/2021   [id number]                      Imaging    ECHO  Gavin MILD PLAQUE AORTA 11/18 PFO    ECHO 12/20 TTE AMPLATZER DEVICE NO FLOW NL EF        CATH/cv surgery  PFO REPAIR 2/19 AMPLATZER DEVICE DR MARROQUIN    CAT SCAN  Cor garett score zero 2015 thymus tissue aorta normal    Coronary calcium score August 2019 zero    cta 4/2021 aorta 4.0 cm    \        ECHO  Gavin 11/18 EF 65%  PFO with left-to-right shunt plaque seen on aorta  agitated saline.       1. Normal left ventricular wall thickness and cavity with preserved systolic function. Estimated EF 65%. No regional wall motion abnormalities.   2. Three cusped aortic valve.  Mild aortic regurgitation.  Normal aortic dimensions. Simple atheroma in the visualized portions of the descending aorta without complex elements.   3. Normal mitral valve.  Trace mitral regurgitation.  Systolic predominance of pulmonary vein flow by spectral doppler.    4. Normal left atrial size. No thrombus or mass seen in the left atrium or left atrial appendage. Left atrial appendage peak emptying velocity 40cm/s.  5. Evidence of small PFO with left to right shunt with agitated saline.       Echo DEC 2021 AORTA 4.0  Ef70% can't exclude smal residual pfo  Aortic root four-point 1 aortic root index 2.2 cm/m² TRACE MR       MRI   mri head 10/18 r cerebellar old infarc  cta brain 11/18 left carotid terminus aneurysm 3 mm, chronic lacunar infarct left cerebellum    cta head 11/18 3mm anudryeleft carotid  terminus  Mild plaque L internal caroitd    mra 12/19 no change 3.2 ic aneurysm        CTA April 2021  Dilated ascending aorta measuring 4.0 cm.    ELECTROPHYSIOLOGY  12/18 monitor 10 beat svt    ECG normla  sinus rhythm normal sinus rhythm nonspecific anterior T wave inversions no ratliff              Dec 2021                                            e       Assessment/Plan     H/O Amplatzer atrial septal defect closure  She presented 2019 cryptogenic right posterior stroke diagnosis and PFO she had Amplatzer repair echo today device appears stable no evidence of shunt normal LV systolic function dilated aortic root 4.0 cm stable    Cerebellar infarct (CMS/HCC)  PFO repair 2019 posterior stroke 2000    Polygenic hypercholesterolemia  Is at baseline LDL cholesterol over 200 family history premature cardiac disease she has a coronary calcium score of 0 in 2019 but plaque was seen in the thoracic aorta with transesophageal echo 2018 she can only tolerate rosuvastatin 20 her LDL cholesterol is 147 I am adding Zetia today she also positive asked lipid clinic she was told her cholesterol profile is favorable she will follow up with Dr. Montelongo in the future    PVD (peripheral vascular disease) (CMS/HCC)  Transesophageal echo done in 2018 plaque seen on the aortic wall my goal for LDL cholesterol would be under 70    Brain aneurysm  3 mm intracerebral carotid aneurysm follows at [place] last imaged February 2021    Dilated aortic root (CMS/HCC)  4.0 cm imaged by CT April 2021 echocardiogram stable December 2020 1 repeat CTA April 2023 echocardiogram December 2022                       She is a history of cryptogenic stroke in February 2019 and had a PFO had Amplatzer repair of the defect  She has familial hyperlipidemia started on rosuvastatin tolerating low-dose  Increase to 20 mg daily  Has intracerebral aneurysm being followed by neurology  Mildly dilated aortic root on echocardiogram follow-up with CTA 4.0 cm by  recent CTA and echocardiogram today  She has familial hyperlipidemia plaque seen on thoracic aorta by transesophageal echo back in 2018  She can only tolerate Crestor 20 mg LDL cholesterol is 147  I am going to add Zetia  She also follows advanced lipid clinic she will be seeing him soon she said he had done some advanced lipids on her and told she had a favorable profile  See her back in 3 months with repeat lab work on combination Zetia and Crestor  Again I am concerned with her familial hyperlipidemia and plaque seen in her thoracic aorta but I would like to push her numbers down  Repeat coronary calcium score in a few years it was 0 in 2019  Repeat echocardiogram in 1 year  Repeat CTA of thoracic aorta April 2023                  This letter was generated using speech recognition software.  Please excuse any typographical errors.                      This letter was generated using speech recognition software.  Please excuse any typographical errors.    Luis Dumont MD  12/2/2021

## 2021-12-01 LAB
ALBUMIN SERPL-MCNC: 4.5 G/DL (ref 3.8–4.8)
ALBUMIN/GLOB SERPL: 1.9 {RATIO} (ref 1.2–2.2)
ALP SERPL-CCNC: 61 IU/L (ref 44–121)
ALT SERPL-CCNC: 35 IU/L (ref 0–32)
AST SERPL-CCNC: 30 IU/L (ref 0–40)
BASOPHILS # BLD AUTO: 0 X10E3/UL (ref 0–0.2)
BASOPHILS NFR BLD AUTO: 1 %
BILIRUB SERPL-MCNC: 0.4 MG/DL (ref 0–1.2)
BUN SERPL-MCNC: 17 MG/DL (ref 6–24)
BUN/CREAT SERPL: 20 (ref 9–23)
CALCIUM SERPL-MCNC: 9.8 MG/DL (ref 8.7–10.2)
CHLORIDE SERPL-SCNC: 101 MMOL/L (ref 96–106)
CHOLEST SERPL-MCNC: 243 MG/DL (ref 100–199)
CO2 SERPL-SCNC: 25 MMOL/L (ref 20–29)
CREAT SERPL-MCNC: 0.86 MG/DL (ref 0.57–1)
EOSINOPHIL # BLD AUTO: 0.1 X10E3/UL (ref 0–0.4)
EOSINOPHIL NFR BLD AUTO: 2 %
ERYTHROCYTE [DISTWIDTH] IN BLOOD BY AUTOMATED COUNT: 13 % (ref 11.7–15.4)
GLOBULIN SER CALC-MCNC: 2.4 G/DL (ref 1.5–4.5)
GLUCOSE SERPL-MCNC: 121 MG/DL (ref 65–99)
HCT VFR BLD AUTO: 39.6 % (ref 34–46.6)
HDLC SERPL-MCNC: 80 MG/DL
HGB BLD-MCNC: 13.3 G/DL (ref 11.1–15.9)
IMM GRANULOCYTES # BLD AUTO: 0 X10E3/UL (ref 0–0.1)
IMM GRANULOCYTES NFR BLD AUTO: 0 %
LAB CORP EGFR IF AFRICN AM: 92 ML/MIN/1.73
LAB CORP EGFR IF NONAFRICN AM: 80 ML/MIN/1.73
LDLC SERPL CALC-MCNC: 147 MG/DL (ref 0–99)
LYMPHOCYTES # BLD AUTO: 2.4 X10E3/UL (ref 0.7–3.1)
LYMPHOCYTES NFR BLD AUTO: 43 %
MCH RBC QN AUTO: 30.1 PG (ref 26.6–33)
MCHC RBC AUTO-ENTMCNC: 33.6 G/DL (ref 31.5–35.7)
MCV RBC AUTO: 90 FL (ref 79–97)
MONOCYTES # BLD AUTO: 0.5 X10E3/UL (ref 0.1–0.9)
MONOCYTES NFR BLD AUTO: 8 %
MORPHOLOGY BLD-IMP: NORMAL
NEUTROPHILS # BLD AUTO: 2.6 X10E3/UL (ref 1.4–7)
NEUTROPHILS NFR BLD AUTO: 46 %
PLATELET # BLD AUTO: NORMAL X10E3/UL
POTASSIUM SERPL-SCNC: 4.5 MMOL/L (ref 3.5–5.2)
PROT SERPL-MCNC: 6.9 G/DL (ref 6–8.5)
RBC # BLD AUTO: 4.42 X10E6/UL (ref 3.77–5.28)
SODIUM SERPL-SCNC: 139 MMOL/L (ref 134–144)
TRIGL SERPL-MCNC: 94 MG/DL (ref 0–149)
VLDLC SERPL CALC-MCNC: 16 MG/DL (ref 5–40)
WBC # BLD AUTO: 5.6 X10E3/UL (ref 3.4–10.8)

## 2021-12-02 ENCOUNTER — HOSPITAL ENCOUNTER (OUTPATIENT)
Dept: CARDIOLOGY | Facility: CLINIC | Age: 51
Discharge: HOME | End: 2021-12-02
Payer: COMMERCIAL

## 2021-12-02 ENCOUNTER — OFFICE VISIT (OUTPATIENT)
Dept: CARDIOLOGY | Facility: CLINIC | Age: 51
End: 2021-12-02
Payer: COMMERCIAL

## 2021-12-02 VITALS
DIASTOLIC BLOOD PRESSURE: 86 MMHG | HEIGHT: 63 IN | SYSTOLIC BLOOD PRESSURE: 128 MMHG | WEIGHT: 180 LBS | BODY MASS INDEX: 31.89 KG/M2

## 2021-12-02 VITALS
HEIGHT: 63 IN | OXYGEN SATURATION: 98 % | DIASTOLIC BLOOD PRESSURE: 86 MMHG | BODY MASS INDEX: 31.89 KG/M2 | HEART RATE: 58 BPM | WEIGHT: 180 LBS | SYSTOLIC BLOOD PRESSURE: 136 MMHG

## 2021-12-02 DIAGNOSIS — Q21.12 PFO (PATENT FORAMEN OVALE): ICD-10-CM

## 2021-12-02 DIAGNOSIS — Z82.49 FAMILY HISTORY OF PREMATURE CORONARY ARTERY DISEASE: ICD-10-CM

## 2021-12-02 DIAGNOSIS — E78.00 POLYGENIC HYPERCHOLESTEROLEMIA: ICD-10-CM

## 2021-12-02 DIAGNOSIS — Z87.74 H/O AMPLATZER ATRIAL SEPTAL DEFECT CLOSURE: ICD-10-CM

## 2021-12-02 DIAGNOSIS — R73.9 HYPERGLYCEMIA: ICD-10-CM

## 2021-12-02 DIAGNOSIS — I77.810 DILATED AORTIC ROOT (CMS/HCC): ICD-10-CM

## 2021-12-02 DIAGNOSIS — I63.9 CEREBELLAR INFARCT (CMS/HCC): Primary | ICD-10-CM

## 2021-12-02 PROBLEM — R42 VERTIGO: Status: RESOLVED | Noted: 2018-10-30 | Resolved: 2021-12-02

## 2021-12-02 LAB
AORTIC ROOT ANNULUS: 3.1 CM
ASCENDING AORTA: 4 CM
AV PEAK GRADIENT: 8 MMHG
AV PEAK VELOCITY-S: 1.4 M/S
AV VALVE AREA: 2.13 CM2
BSA FOR ECHO PROCEDURE: 1.9 M2
CUSP SEPARATION: 1.6 CM
E WAVE DECELERATION TIME: 296 MS
E/A RATIO: 1.1
E/E' RATIO: 11.2
E/LAT E' RATIO: 6.1
EDV (BP): 85.6 CM3
EF (A4C): 64 %
EF A2C: 60.2 %
EJECTION FRACTION: 63.2 %
ESV (BP): 31.5 CM3
FRACTIONAL SHORTENING: 33.1 %
INTERVENTRICULAR SEPTUM: 0.87 CM
LA ESV (BP): 43 CM3
LA ESV INDEX (A2C): 16 CM3/M2
LA ESV INDEX (BP): 22.63 CM3/M2
LA/AORTA RATIO: 1.13
LAAS-AP2: 13.1 CM2
LAAS-AP4: 18.9 CM2
LAD 2D: 3.5 CM
LALD A4C: 4.61 CM
LALD A4C: 5.55 CM
LAV-S: 30.4 CM3
LEFT ATRIUM VOLUME INDEX: 26.74 CM3/M2
LEFT ATRIUM VOLUME: 50.8 CM3
LEFT INTERNAL DIMENSION IN SYSTOLE: 3.16 CM (ref 2.58–3.9)
LEFT VENTRICLE DIASTOLIC VOLUME INDEX: 47.84 CM3/M2
LEFT VENTRICLE DIASTOLIC VOLUME: 90.9 CM3
LEFT VENTRICLE SYSTOLIC VOLUME INDEX: 17.26 CM3/M2
LEFT VENTRICLE SYSTOLIC VOLUME: 32.8 CM3
LEFT VENTRICULAR INTERNAL DIMENSION IN DIASTOLE: 4.72 CM (ref 4.36–6.06)
LEFT VENTRICULAR POSTERIOR WALL IN END DIASTOLE: 0.64 CM (ref 0.57–1.07)
LV DIASTOLIC VOLUME: 76.3 CM3
LV ESV (APICAL 2 CHAMBER): 30.3 CM3
LVAD-AP2: 27.8 CM2
LVAD-AP4: 31.7 CM2
LVAS-AP2: 16.4 CM2
LVAS-AP4: 17.1 CM2
LVEDVI(A2C): 40.16 CM3/M2
LVEDVI(BP): 45.05 CM3/M2
LVESVI(A2C): 15.95 CM3/M2
LVESVI(BP): 16.58 CM3/M2
LVLD-AP2: 8.56 CM
LVLD-AP4: 9.09 CM
LVLS-AP2: 7.81 CM
LVLS-AP4: 7.79 CM
LVOT 2D: 2.1 CM
LVOT A: 3.46 CM2
LVOT PEAK VELOCITY: 0.86 M/S
MV E'TISSUE VEL-LAT: 0.11 M/S
MV E'TISSUE VEL-MED: 0.06 M/S
MV PEAK A VEL: 0.61 M/S
MV PEAK E VEL: 0.67 M/S
MV VALVE AREA P 1/2 METHOD: 2.53 CM2
POSTERIOR WALL: 0.64 CM
PULMONARY REGURGITATION LATE DIASTOLIC VELOCITY: 0.82 M/S
RVOT VMAX: 0.64 M/S
SEPTAL TISSUE DOPPLER FREE WALL LATE DIA VELOCITY (APICAL 4 CHAMBER VIEW): 0.09 M/S
Z-SCORE OF LEFT VENTRICULAR DIMENSION IN END DIASTOLE: -0.85
Z-SCORE OF LEFT VENTRICULAR DIMENSION IN END SYSTOLE: -0.03
Z-SCORE OF LEFT VENTRICULAR POSTERIOR WALL IN END DIASTOLE: -1.02

## 2021-12-02 PROCEDURE — 3008F BODY MASS INDEX DOCD: CPT | Performed by: INTERNAL MEDICINE

## 2021-12-02 PROCEDURE — 93000 ELECTROCARDIOGRAM COMPLETE: CPT | Performed by: INTERNAL MEDICINE

## 2021-12-02 PROCEDURE — 99214 OFFICE O/P EST MOD 30 MIN: CPT | Performed by: INTERNAL MEDICINE

## 2021-12-02 PROCEDURE — 93306 TTE W/DOPPLER COMPLETE: CPT | Performed by: INTERNAL MEDICINE

## 2021-12-02 RX ORDER — EZETIMIBE 10 MG/1
10 TABLET ORAL NIGHTLY
Qty: 90 TABLET | Refills: 1 | Status: SHIPPED | OUTPATIENT
Start: 2021-12-02 | End: 2022-04-07

## 2021-12-02 RX ORDER — EZETIMIBE 10 MG/1
10 TABLET ORAL NIGHTLY
Qty: 90 TABLET | Refills: 1 | Status: SHIPPED | OUTPATIENT
Start: 2021-12-02 | End: 2021-12-02

## 2021-12-02 ASSESSMENT — PAIN SCALES - GENERAL: PAINLEVEL: 0-NO PAIN

## 2021-12-02 NOTE — ASSESSMENT & PLAN NOTE
She presented 2019 cryptogenic right posterior stroke diagnosis and PFO she had Amplatzer repair echo today device appears stable no evidence of shunt normal LV systolic function dilated aortic root 4.0 cm stable

## 2021-12-02 NOTE — ASSESSMENT & PLAN NOTE
4.0 cm imaged by CT April 2021 echocardiogram stable December 2020 1 repeat CTA April 2023 echocardiogram December 2022

## 2021-12-02 NOTE — ASSESSMENT & PLAN NOTE
Transesophageal echo done in 2018 plaque seen on the aortic wall my goal for LDL cholesterol would be under 70

## 2021-12-02 NOTE — ASSESSMENT & PLAN NOTE
Is at baseline LDL cholesterol over 200 family history premature cardiac disease she has a coronary calcium score of 0 in 2019 but plaque was seen in the thoracic aorta with transesophageal echo 2018 she can only tolerate rosuvastatin 20 her LDL cholesterol is 147 I am adding Zetia today she also positive asked lipid clinic she was told her cholesterol profile is favorable she will follow up with Dr. Montelongo in the future

## 2021-12-08 ENCOUNTER — TELEPHONE (OUTPATIENT)
Dept: OTOLARYNGOLOGY | Facility: CLINIC | Age: 51
End: 2021-12-08
Payer: COMMERCIAL

## 2021-12-08 DIAGNOSIS — H81.10 BENIGN PAROXYSMAL POSITIONAL VERTIGO, UNSPECIFIED LATERALITY: Primary | ICD-10-CM

## 2021-12-08 NOTE — TELEPHONE ENCOUNTER
If anyone cancels, I would be happy to see her.  However, we are overbooked for the rest of the week.  Please check Dr. Car's schedule to see if you can add a brief appointment.  I have written a referral for vestibular therapy which she could do at [facility].  Please give her the scheduling number because they might have an availability quicker than we do.

## 2021-12-08 NOTE — TELEPHONE ENCOUNTER
Pt was last seen in 2018.    As of yesterday she is dealing bad vertigo, she tried to do the Eply on herself and made it worse.  She would like to come asap to see either doctor.    Please advise.

## 2021-12-08 NOTE — TELEPHONE ENCOUNTER
Dr. Car had an opening Friday and I fit her it.  Also gave her the information RE Vestibular therapy and gave her the number to scheduling.

## 2021-12-10 ENCOUNTER — OFFICE VISIT (OUTPATIENT)
Dept: OTOLARYNGOLOGY | Facility: CLINIC | Age: 51
End: 2021-12-10
Payer: COMMERCIAL

## 2021-12-10 VITALS
WEIGHT: 180 LBS | DIASTOLIC BLOOD PRESSURE: 84 MMHG | BODY MASS INDEX: 31.89 KG/M2 | HEART RATE: 61 BPM | TEMPERATURE: 97.2 F | SYSTOLIC BLOOD PRESSURE: 116 MMHG | HEIGHT: 63 IN | OXYGEN SATURATION: 99 %

## 2021-12-10 DIAGNOSIS — H81.10 BENIGN PAROXYSMAL POSITIONAL VERTIGO, UNSPECIFIED LATERALITY: Primary | ICD-10-CM

## 2021-12-10 PROCEDURE — 99213 OFFICE O/P EST LOW 20 MIN: CPT | Performed by: OTOLARYNGOLOGY

## 2021-12-10 PROCEDURE — 3008F BODY MASS INDEX DOCD: CPT | Performed by: OTOLARYNGOLOGY

## 2021-12-10 ASSESSMENT — ENCOUNTER SYMPTOMS: DIZZINESS: 1

## 2021-12-10 NOTE — PROGRESS NOTES
[facility]  [address], [address]  [place] [address]  Phone: [phone]  Fax: [phone]      Patient ID: Orly Lara                              : 1970    Visit Date: 12/10/2021  Referring Provider: No ref. provider found    Chief Complaint: Vertigo (light headed (residual))      Orly Lara is a 51 y.o.  female who presents with BPPV.  She has a history of left BPPV that was treated by Dr. Baltazar in 2018.  She was doing well until Tuesday this week where it seems to have woken her up from sleep and she had room spinning vertigo that was positional again.  She tried to do the Epley and half somersault maneuver at home initially.  However was unsuccessful.  She scheduled this appointment and then on Wednesday tried the half somersault again and it seems to have worked.  She is kept her head in neutral position since then.  Her daughter who is in physical therapy school then checked the Emani-Hallpike on her and it seems like it has worked.  She would not like to be turned or manipulated today because she feels like it is better.  She would like advice on what to do to prevent the BPPV from coming back.  She is also interested in seeing an independent vestibular physical therapist outside of the [facility] somebody she was referred to by a friend.    Review of Systems   Neurological: Positive for dizziness.   All other systems reviewed and are negative.      Current Medications: has a current medication list which includes the following prescription(s): ascorbic acid, aspirin, ezetimibe, hydrochlorothiazide, lactobacillus acidophilus, magnesium, omega-3 fatty acids, rosuvastatin, and valacyclovir.    Past Medical History:   Past Medical History:   Diagnosis Date   • Aneurysm (CMS/HCC)    • Hypertension    • Lipid disorder    • Positional vertigo    • Stroke (CMS/HCC)        Past Surgical History:   Past Surgical History:   Procedure Laterality Date   • CARDIAC SURGERY     •  "HYSTEROSCOPY W/ ENDOMETRIAL ABLATION     • PATENT FORAMEN OVALE CLOSURE     • SKIN BIOPSY         Social History:  reports that she quit smoking about 15 years ago. She has never used smokeless tobacco. She reports current alcohol use. She reports previous drug use.    Family History:   Family History   Problem Relation Age of Onset   • Heart attack Biological Mother    • Hyperlipidemia Biological Mother    • Stroke Biological Mother    • Heart disease Biological Father            Allergies: Crestor [rosuvastatin], Iodinated contrast media, and Lipitor [atorvastatin]    Physical Exam:  Vitals:   Visit Vitals  /84 (BP Location: Left upper arm, Patient Position: Sitting)   Pulse 61   Temp 36.2 °C (97.2 °F) (Temporal)   Ht 1.6 m (5' 3\")   Wt 81.6 kg (180 lb)   SpO2 99%   BMI 31.89 kg/m²     General:  Well-developed, well-nourished 51 y.o. femalein no acute distress.  Voice: Normal without hoarseness, breathiness or stridor.  Head/face:  No scars or lesions.  No parotid masses. No presinus tenderness. Symmetric, normal facies.  Eyes:  Extraocular movements and gaze alignment normal.  Ears: Auricles normal.  External auditory canals free of cerumen.  Tympanic membranes clear, mobile and without retraction or scar.  Bilateral ears visualized with otomicroscope  Nose:  Dorsum straight.  Septum inferior septal spur.  Turbinates edematous bilaterally.  No pus, polyps or crusts.  Oral Cavity/oropharynx:  Normal tongue thrust. Normal gag reflex. No masses, leukoplakia, erythroplakia, ulcers or other abnormalities at the tongue, floor of mouth, buccal mucosa, palate or posterior pharyngeal wall.     Neck:  No masses, adenopathy, cervical spasm or thyroid enlargement.  Cranial Nerves II through XII: Grossly intact.  Lungs: equal chest rise  Heart: Regular rate and rhythm.  Mental status: Awake, alert and oriented ×3.          Impression:  51 y.o.  female with Benign paroxysmal positional vertigo, unspecified laterality  " (primary encounter diagnosis)    51-year-old female with BPPV which seems to have resolved on its own with half somersault and home Epley maneuver.She did not want to try any further maneuvering.  In the office today.  I provided her a handwritten prescription for vestibular physical therapy that she can bring to the therapist of her choice.      Problem List Items Addressed This Visit     None      Visit Diagnoses     Benign paroxysmal positional vertigo, unspecified laterality    -  Primary          Grace Car MD

## 2022-01-11 ENCOUNTER — TELEPHONE (OUTPATIENT)
Dept: CARDIOLOGY | Facility: CLINIC | Age: 52
End: 2022-01-11
Payer: COMMERCIAL

## 2022-01-11 DIAGNOSIS — E78.00 POLYGENIC HYPERCHOLESTEROLEMIA: Primary | ICD-10-CM

## 2022-01-11 RX ORDER — ROSUVASTATIN CALCIUM 20 MG/1
TABLET, COATED ORAL
Qty: 90 TABLET | Refills: 3 | Status: SHIPPED | OUTPATIENT
Start: 2022-01-11 | End: 2023-03-09

## 2022-02-24 ENCOUNTER — TELEPHONE (OUTPATIENT)
Dept: FAMILY MEDICINE | Facility: CLINIC | Age: 52
End: 2022-02-24
Payer: COMMERCIAL

## 2022-02-25 LAB
ALBUMIN SERPL-MCNC: 4.9 G/DL (ref 3.8–4.8)
ALBUMIN/GLOB SERPL: 2.1 {RATIO} (ref 1.2–2.2)
ALP SERPL-CCNC: 61 IU/L (ref 44–121)
ALT SERPL-CCNC: 17 IU/L (ref 0–32)
AST SERPL-CCNC: 20 IU/L (ref 0–40)
BILIRUB SERPL-MCNC: 0.2 MG/DL (ref 0–1.2)
BUN SERPL-MCNC: 12 MG/DL (ref 6–24)
BUN/CREAT SERPL: 14 (ref 9–23)
CALCIUM SERPL-MCNC: 10.2 MG/DL (ref 8.7–10.2)
CHLORIDE SERPL-SCNC: 100 MMOL/L (ref 96–106)
CHOLEST SERPL-MCNC: 223 MG/DL (ref 100–199)
CO2 SERPL-SCNC: 24 MMOL/L (ref 20–29)
CREAT SERPL-MCNC: 0.85 MG/DL (ref 0.57–1)
GLOBULIN SER CALC-MCNC: 2.3 G/DL (ref 1.5–4.5)
GLUCOSE SERPL-MCNC: 129 MG/DL (ref 65–99)
HBA1C MFR BLD: 6.2 % (ref 4.8–5.6)
HDLC SERPL-MCNC: 80 MG/DL
LAB CORP EGFR IF AFRICN AM: 92 ML/MIN/1.73
LAB CORP EGFR IF NONAFRICN AM: 80 ML/MIN/1.73
LDLC SERPL CALC-MCNC: 129 MG/DL (ref 0–99)
POTASSIUM SERPL-SCNC: 4 MMOL/L (ref 3.5–5.2)
PROT SERPL-MCNC: 7.2 G/DL (ref 6–8.5)
SODIUM SERPL-SCNC: 140 MMOL/L (ref 134–144)
TRIGL SERPL-MCNC: 82 MG/DL (ref 0–149)
VLDLC SERPL CALC-MCNC: 14 MG/DL (ref 5–40)

## 2022-04-07 ENCOUNTER — OFFICE VISIT (OUTPATIENT)
Dept: FAMILY MEDICINE | Facility: CLINIC | Age: 52
End: 2022-04-07
Payer: COMMERCIAL

## 2022-04-07 VITALS
HEIGHT: 63 IN | HEART RATE: 55 BPM | BODY MASS INDEX: 29.98 KG/M2 | TEMPERATURE: 97.9 F | DIASTOLIC BLOOD PRESSURE: 90 MMHG | OXYGEN SATURATION: 99 % | WEIGHT: 169.2 LBS | SYSTOLIC BLOOD PRESSURE: 123 MMHG

## 2022-04-07 DIAGNOSIS — Z11.59 NEED FOR HEPATITIS C SCREENING TEST: ICD-10-CM

## 2022-04-07 DIAGNOSIS — Q21.12 PFO (PATENT FORAMEN OVALE): ICD-10-CM

## 2022-04-07 DIAGNOSIS — M25.50 MULTIPLE JOINT PAIN: ICD-10-CM

## 2022-04-07 DIAGNOSIS — Z00.00 ENCOUNTER FOR GENERAL ADULT MEDICAL EXAMINATION WITHOUT ABNORMAL FINDINGS: Primary | ICD-10-CM

## 2022-04-07 DIAGNOSIS — I47.10 SVT (SUPRAVENTRICULAR TACHYCARDIA) (CMS/HCC): ICD-10-CM

## 2022-04-07 DIAGNOSIS — Z12.11 SCREEN FOR COLON CANCER: ICD-10-CM

## 2022-04-07 DIAGNOSIS — I67.1 BRAIN ANEURYSM: ICD-10-CM

## 2022-04-07 PROCEDURE — 36415 COLL VENOUS BLD VENIPUNCTURE: CPT | Performed by: FAMILY MEDICINE

## 2022-04-07 PROCEDURE — 99396 PREV VISIT EST AGE 40-64: CPT | Performed by: FAMILY MEDICINE

## 2022-04-07 PROCEDURE — 3008F BODY MASS INDEX DOCD: CPT | Performed by: FAMILY MEDICINE

## 2022-04-07 NOTE — PROGRESS NOTES
HPI:  Orly Lara is an 52 y.o. female presenting for annual well visit.      Ongoing pain in bilateral ankles, knees, shoulders    Labs 2020 negative, no further evaluation completed.      Allergies:  Crestor [rosuvastatin], Iodinated contrast media, and Lipitor [atorvastatin]    Past Medical History:   Diagnosis Date   • Aneurysm (CMS/HCC)    • Hypertension    • Lipid disorder    • Positional vertigo    • Stroke (CMS/HCC)        Past Surgical History:   Procedure Laterality Date   • CARDIAC SURGERY     • HYSTEROSCOPY W/ ENDOMETRIAL ABLATION     • PATENT FORAMEN OVALE CLOSURE     • SKIN BIOPSY         Social History     Tobacco Use   • Smoking status: Former Smoker     Quit date:      Years since quittin.2   • Smokeless tobacco: Never Used   Substance Use Topics   • Alcohol use: Yes     Comment: 10 drinks 2 days per week       Current Outpatient Medications on File Prior to Visit   Medication Sig Dispense Refill   • ascorbic acid (VITAMIN C) 500 mg tablet Take 500 mg by mouth daily.     • aspirin 81 mg enteric coated tablet Take 1 tablet (81 mg total) by mouth daily. 90 tablet 1   • hydrochlorothiazide 25 mg tablet TAKE ONE TABLET (25 MG TOTAL) BY MOUTH DAILY. 90 tablet 2   • Lactobacillus acidophilus (PROBIOTIC ORAL) Take 1 capsule by mouth daily.     • MAGNESIUM ORAL Take 30 mg by mouth daily.     • omega-3 fatty acids (FISH OIL CONCENTRATE ORAL) Take by mouth.     • rosuvastatin 20 mg tablet TAKE ONE TABLET (20 MG TOTAL) BY MOUTH DAILY. 90 tablet 3   • ezetimibe 10 mg tablet Take 1 tablet (10 mg total) by mouth nightly. (Patient not taking: Reported on 2022) 90 tablet 1   • valACYclovir (VALTREX) 1 gram tablet Take 2 tablets (2,000 mg total) by mouth 2 (two) times a day for 2 doses. 30 tablet 0     No current facility-administered medications on file prior to visit.       Family History   Problem Relation Age of Onset   • Heart attack Biological Mother    • Hyperlipidemia Biological Mother    •  "Stroke Biological Mother    • Heart disease Biological Father        Visit Vitals  /90 (BP Location: Left upper arm, Patient Position: Sitting)   Pulse (!) 55   Temp 36.6 °C (97.9 °F) (Oral)   Ht 1.6 m (5' 3\")   Wt 76.7 kg (169 lb 3.2 oz)   SpO2 99%   BMI 29.97 kg/m²        Physical Exam  Constitutional:       General: She is not in acute distress.     Appearance: Normal appearance. She is not ill-appearing.   HENT:      Right Ear: Tympanic membrane and ear canal normal.      Left Ear: Tympanic membrane and ear canal normal.      Nose: Nose normal.      Mouth/Throat:      Pharynx: Oropharynx is clear.   Eyes:      Extraocular Movements: Extraocular movements intact.      Pupils: Pupils are equal, round, and reactive to light.   Neck:      Thyroid: No thyromegaly.   Cardiovascular:      Rate and Rhythm: Normal rate and regular rhythm.      Pulses: Normal pulses.           Dorsalis pedis pulses are 2+ on the right side and 2+ on the left side.      Heart sounds: Normal heart sounds. No murmur heard.  Pulmonary:      Effort: Pulmonary effort is normal. No respiratory distress.      Breath sounds: Normal breath sounds. No wheezing, rhonchi or rales.   Abdominal:      General: Bowel sounds are normal.      Palpations: Abdomen is soft. There is no mass.      Tenderness: There is no abdominal tenderness. There is no guarding or rebound.   Musculoskeletal:         General: No swelling or tenderness. Normal range of motion.      Cervical back: Normal range of motion and neck supple.      Right lower leg: No edema.      Left lower leg: No edema.   Lymphadenopathy:      Cervical: No cervical adenopathy.   Skin:     Findings: No lesion or rash.   Neurological:      General: No focal deficit present.      Mental Status: She is alert.      Deep Tendon Reflexes: Reflexes normal.   Psychiatric:         Mood and Affect: Mood normal.           A/P    1. Encounter for general adult medical examination without abnormal " findings      2. Brain aneurysm  Recent follow-up with neurology, stable, monitored annually    3. SVT (supraventricular tachycardia) (CMS/HCC)  Stable, follows with Dr. Dumont    4. PFO (patent foramen ovale)  As above    5. Multiple joint pain  Chronic, repeat labs, then plan to check imaging  - JETHRO Screen (Automated)  - C-reactive protein  - Rheumatoid factor  - Sedimentation rate    6. Screen for colon cancer    - Direct Access Colonoscopy BMMSA    7. Need for hepatitis C screening test    - Hepatitis C antibody    Declines Shingrix today.    Next appointment recommended:  TBD      The patient (parent) indicates an understanding of the events of today's medical evaluation and agrees to the plan of care.    I have asked the patient (parent) to be on the alert for new or worsening symptoms and to call the office directly or proceed to the nearest ER if such should occur.

## 2022-04-08 LAB
CRP SERPL-MCNC: <1 MG/L (ref 0–10)
ERYTHROCYTE [SEDIMENTATION RATE] IN BLOOD BY WESTERGREN METHOD: 11 MM/HR (ref 0–40)
HCV AB S/CO SERPL IA: <0.1 S/CO RATIO (ref 0–0.9)
RHEUMATOID FACT SERPL-ACNC: <10 IU/ML

## 2022-04-09 LAB
ANA HOMOGEN TITR SER: NORMAL {TITER}
ANA SER QL IF: POSITIVE
CENTROMERE B AB SER-ACNC: <0.2 AI (ref 0–0.9)
CHROMATIN AB SERPL-ACNC: <0.2 AI (ref 0–0.9)
DSDNA AB SER-ACNC: 1 IU/ML (ref 0–9)
ENA JO1 AB SER-ACNC: <0.2 AI (ref 0–0.9)
ENA RNP AB SER-ACNC: 0.2 AI (ref 0–0.9)
ENA SCL70 AB SER-ACNC: <0.2 AI (ref 0–0.9)
ENA SM AB SER-ACNC: <0.2 AI (ref 0–0.9)
ENA SS-A AB SER-ACNC: <0.2 AI (ref 0–0.9)
ENA SS-B AB SER-ACNC: <0.2 AI (ref 0–0.9)
LAB CORP NOTE:: NORMAL
LAB CORP SEE BELOW:: NORMAL
LABORATORY COMMENT REPORT: ABNORMAL

## 2022-05-03 ENCOUNTER — HOSPITAL ENCOUNTER (OUTPATIENT)
Dept: RADIOLOGY | Facility: CLINIC | Age: 52
Discharge: HOME | End: 2022-05-03
Attending: FAMILY MEDICINE
Payer: COMMERCIAL

## 2022-05-03 DIAGNOSIS — M25.562 CHRONIC PAIN OF BOTH KNEES: ICD-10-CM

## 2022-05-03 DIAGNOSIS — G89.29 CHRONIC PAIN OF BOTH KNEES: ICD-10-CM

## 2022-05-03 DIAGNOSIS — M25.561 CHRONIC PAIN OF BOTH KNEES: ICD-10-CM

## 2022-05-03 DIAGNOSIS — M25.572 CHRONIC PAIN OF BOTH ANKLES: ICD-10-CM

## 2022-05-03 DIAGNOSIS — M25.571 CHRONIC PAIN OF BOTH ANKLES: ICD-10-CM

## 2022-05-03 DIAGNOSIS — G89.29 CHRONIC PAIN OF BOTH ANKLES: ICD-10-CM

## 2022-05-03 PROCEDURE — 73610 X-RAY EXAM OF ANKLE: CPT | Mod: 50

## 2022-05-03 PROCEDURE — 73564 X-RAY EXAM KNEE 4 OR MORE: CPT | Mod: 50

## 2022-05-04 ENCOUNTER — TELEPHONE (OUTPATIENT)
Dept: CARDIOLOGY | Facility: CLINIC | Age: 52
End: 2022-05-04
Payer: COMMERCIAL

## 2022-05-04 NOTE — TELEPHONE ENCOUNTER
Call to patient and left message that Dr Dumont request patient to make appointment for office visit and EKG sometime in July or August for follow up visit prior to procedure 9/20/2022 Colonoscopy related to medical clearance

## 2022-05-05 DIAGNOSIS — M25.562 CHRONIC PAIN OF BOTH KNEES: ICD-10-CM

## 2022-05-05 DIAGNOSIS — M25.561 CHRONIC PAIN OF BOTH KNEES: ICD-10-CM

## 2022-05-05 DIAGNOSIS — G89.29 CHRONIC PAIN OF BOTH KNEES: ICD-10-CM

## 2022-05-05 DIAGNOSIS — M25.572 CHRONIC PAIN OF BOTH ANKLES: Primary | ICD-10-CM

## 2022-05-05 DIAGNOSIS — M25.571 CHRONIC PAIN OF BOTH ANKLES: Primary | ICD-10-CM

## 2022-05-05 DIAGNOSIS — G89.29 CHRONIC PAIN OF BOTH ANKLES: Primary | ICD-10-CM

## 2022-07-07 ENCOUNTER — TELEPHONE (OUTPATIENT)
Dept: SCHEDULING | Facility: CLINIC | Age: 52
End: 2022-07-07
Payer: COMMERCIAL

## 2022-07-07 NOTE — TELEPHONE ENCOUNTER
Cardiac Clearance either [facility], [facility]     Name of caller: Orly    Relationship to patient: Self    Name of patient: Orly Lara    Name of physician: Luis Dumont MD    Date of Surgery: 9/20/2022    Type of Surgery: colonoscopy     Name of surgeon: Dr wu Bañuelos    Office contact number: [phone]    Office fax number: [phone]      Additional notes: Pt states that echo order is needed for follow up appt .

## 2022-08-24 LAB
ALBUMIN SERPL-MCNC: 5 G/DL (ref 3.8–4.8)
ALBUMIN/GLOB SERPL: 2.4 {RATIO} (ref 1.2–2.2)
ALP SERPL-CCNC: 52 IU/L (ref 44–121)
ALT SERPL-CCNC: 20 IU/L (ref 0–32)
AST SERPL-CCNC: 24 IU/L (ref 0–40)
BASOPHILS # BLD AUTO: 0 X10E3/UL (ref 0–0.2)
BASOPHILS NFR BLD AUTO: 1 %
BILIRUB SERPL-MCNC: 0.7 MG/DL (ref 0–1.2)
BUN SERPL-MCNC: 10 MG/DL (ref 6–24)
BUN/CREAT SERPL: 11 (ref 9–23)
CALCIUM SERPL-MCNC: 10.2 MG/DL (ref 8.7–10.2)
CHLORIDE SERPL-SCNC: 99 MMOL/L (ref 96–106)
CHOLEST SERPL-MCNC: 229 MG/DL (ref 100–199)
CO2 SERPL-SCNC: 23 MMOL/L (ref 20–29)
CREAT SERPL-MCNC: 0.89 MG/DL (ref 0.57–1)
EGFRCR-CYS SERPLBLD CKD-EPI 2021: 79 ML/MIN/1.73
EOSINOPHIL # BLD AUTO: 0.1 X10E3/UL (ref 0–0.4)
EOSINOPHIL NFR BLD AUTO: 1 %
ERYTHROCYTE [DISTWIDTH] IN BLOOD BY AUTOMATED COUNT: 13.1 % (ref 11.7–15.4)
GLOBULIN SER CALC-MCNC: 2.1 G/DL (ref 1.5–4.5)
GLUCOSE SERPL-MCNC: 89 MG/DL (ref 65–99)
HBA1C MFR BLD: 5.8 % (ref 4.8–5.6)
HCT VFR BLD AUTO: 37.2 % (ref 34–46.6)
HDLC SERPL-MCNC: 88 MG/DL
HGB BLD-MCNC: 12.6 G/DL (ref 11.1–15.9)
IMM GRANULOCYTES # BLD AUTO: 0 X10E3/UL (ref 0–0.1)
IMM GRANULOCYTES NFR BLD AUTO: 0 %
LDLC SERPL CALC-MCNC: 127 MG/DL (ref 0–99)
LYMPHOCYTES # BLD AUTO: 2.5 X10E3/UL (ref 0.7–3.1)
LYMPHOCYTES NFR BLD AUTO: 38 %
MCH RBC QN AUTO: 30 PG (ref 26.6–33)
MCHC RBC AUTO-ENTMCNC: 33.9 G/DL (ref 31.5–35.7)
MCV RBC AUTO: 89 FL (ref 79–97)
MONOCYTES # BLD AUTO: 0.4 X10E3/UL (ref 0.1–0.9)
MONOCYTES NFR BLD AUTO: 7 %
MORPHOLOGY BLD-IMP: NORMAL
NEUTROPHILS # BLD AUTO: 3.5 X10E3/UL (ref 1.4–7)
NEUTROPHILS NFR BLD AUTO: 53 %
PLATELET # BLD AUTO: NORMAL X10E3/UL
POTASSIUM SERPL-SCNC: 4.2 MMOL/L (ref 3.5–5.2)
PROT SERPL-MCNC: 7.1 G/DL (ref 6–8.5)
RBC # BLD AUTO: 4.2 X10E6/UL (ref 3.77–5.28)
SODIUM SERPL-SCNC: 137 MMOL/L (ref 134–144)
TRIGL SERPL-MCNC: 80 MG/DL (ref 0–149)
VLDLC SERPL CALC-MCNC: 14 MG/DL (ref 5–40)
WBC # BLD AUTO: 6.5 X10E3/UL (ref 3.4–10.8)

## 2022-09-07 DIAGNOSIS — Z87.74 H/O AMPLATZER ATRIAL SEPTAL DEFECT CLOSURE: Primary | ICD-10-CM

## 2022-09-07 PROBLEM — E78.00 POLYGENIC HYPERCHOLESTEROLEMIA: Status: RESOLVED | Noted: 2019-08-16 | Resolved: 2022-09-07

## 2022-09-07 RX ORDER — AMOXICILLIN 500 MG/1
500 CAPSULE ORAL SEE ADMIN INSTRUCTIONS
Qty: 8 CAPSULE | Refills: 2 | Status: SHIPPED | OUTPATIENT
Start: 2022-09-07 | End: 2022-09-14

## 2022-09-07 NOTE — TELEPHONE ENCOUNTER
Pt called stated her daughter tested positive for COVID. Wants to know if today's appt can be a tele health appt instead or should she reschedule before her colonoscopy on 9/20    Best contact

## 2022-09-07 NOTE — TELEPHONE ENCOUNTER
Bring her in for EKG if possible echo prior to her colonoscopy  If you can schedule an office visit t also, great if not office visit later

## 2022-09-07 NOTE — TELEPHONE ENCOUNTER
Dr. Julia Stacy does not do tele appointments.  She will need to be rescheduled for an appointment.  Her colonoscopy may need to be rescheduled if she can not see Dr. Dumont before 9/20.

## 2022-09-07 NOTE — PROGRESS NOTES
I spoke to her bring in EKG   And echo  prior to her colonoscopy if YOU can make an office visit great if not  Make an office visit after the colonoscopy   Nursing notes reviewed and accepted.    Nargis Kidd is a 2 month old female who presents for 2 month old well child exam.  Patient presents with Mother.    Concerns raised today include: none    Feeding: breast is adequate.  Sleeping: No sleep or behavioral concerns.  Elimination:  Normal wet diapers and bowel movements.    SOCIAL:  Primary caretakers: parents  Support at home:  Yes  Smoke exposure: none    DEVELOPMENT:  vocalizes, smiles responsively, follows to mid line and responds to sounds    Birth history, medical history, surgical history, and family history reviewed and updated.    REVIEW OF SYSTEMS see HPI; otherwise denies HEENT, NECK, RESP, CARDIAC, GI, , NEURO or PSYCH Sx      PHYSICAL EXAM:  Height 22.99\" (58.4 cm), weight 5.335 kg, head circumference 38.9 cm (15.32\").  GENERAL:  Well appearing  female, nontoxic, no acute distress.  Alert and     interactive.  SKIN: Warm, normal turgor.  No cyanosis.  No bruises or lesions.  HEAD:  Normocephalic, atraumatic.  Anterior fontanel open, soft and flat.  EYES:  Conjunctivae appear normal with neither icterus nor subconjunctival hemorrhage.  Pupils equal, round, reactive to light; extraocular movements intact; positive red reflex bilaterally.  NOSE:  Appears normal, no flaring.  EARS:  Normal pinnae, no preauricular skin tags or pit.  Tympanic membranes are transparent with good    landmarks.  THROAT:  Oropharynx with moist mucous membranes and no lesions.  NECK:  Supple, no lymphadenopathy or masses.  HEART:  Regular rate and rhythm.  Quiet precordium.  Normal S1, S2.  No murmurs, rubs, gallops.   LUNGS:  Clear to auscultation bilaterally.  No wheezes, rales, rhonchi.  Normal work of breathing.  ABDOMEN:  Umbilical stump is normal.  Soft, nontender.  No organomegaly or masses.  GENITOURINARY: Arnold 1 female   MUSCULOSKELETAL:  Hips within normal range of motion.  Negative Delgado, Ortolani.  Spine straight.  Normal sacrum.  EXTREMITIES:  Warm, dry,  without abnormalities.  NEUROLOGIC:  Normal tone, bulk, strength.    ASSESSMENT:  2 month old female well child.    PLAN:   All parental concerns and questions discussed.  Anticipatory guidance provided, handout given.                Fever management                Sleep management                Sudden infant death syndrome prevention                Accident prevention: car seat, crib, water heater temperature                Delay solids until 6 months                Tobacco-free home                Vitamin D supplementation: recommended  Immunizations per orders.  Risks, benefits, and side effects of each component discussed.  Return to clinic for 4 month well child examination or sooner prn illness/concerns.

## 2022-09-07 NOTE — TELEPHONE ENCOUNTER
Left msg on pt's v/m that we will need to cancel appt for today and reschedule.  Per Dr Dumont, will schedule ekg and echo prior to colonoscopy on 9/20 for clearance.

## 2022-09-09 ENCOUNTER — TELEPHONE (OUTPATIENT)
Dept: CARDIOLOGY | Facility: CLINIC | Age: 52
End: 2022-09-09
Payer: COMMERCIAL

## 2022-09-09 NOTE — TELEPHONE ENCOUNTER
No precert required for echo CPT code 28139 per [place] at [facility] #[phone], Ref #[id number].

## 2022-09-12 ENCOUNTER — APPOINTMENT (OUTPATIENT)
Dept: CARDIOLOGY | Facility: CLINIC | Age: 52
End: 2022-09-12
Attending: INTERNAL MEDICINE
Payer: COMMERCIAL

## 2022-09-12 ENCOUNTER — HOSPITAL ENCOUNTER (OUTPATIENT)
Dept: CARDIOLOGY | Facility: CLINIC | Age: 52
Discharge: HOME | End: 2022-09-12
Attending: INTERNAL MEDICINE
Payer: COMMERCIAL

## 2022-09-12 VITALS
DIASTOLIC BLOOD PRESSURE: 90 MMHG | SYSTOLIC BLOOD PRESSURE: 123 MMHG | HEIGHT: 63 IN | BODY MASS INDEX: 29.95 KG/M2 | WEIGHT: 169 LBS

## 2022-09-12 DIAGNOSIS — Q21.12 PFO (PATENT FORAMEN OVALE): Primary | ICD-10-CM

## 2022-09-12 DIAGNOSIS — I47.10 SVT (SUPRAVENTRICULAR TACHYCARDIA) (CMS/HCC): ICD-10-CM

## 2022-09-12 DIAGNOSIS — Z87.74 H/O AMPLATZER ATRIAL SEPTAL DEFECT CLOSURE: ICD-10-CM

## 2022-09-12 DIAGNOSIS — Q21.12 PFO (PATENT FORAMEN OVALE): ICD-10-CM

## 2022-09-12 PROCEDURE — 93000 ELECTROCARDIOGRAM COMPLETE: CPT | Performed by: INTERNAL MEDICINE

## 2022-09-12 PROCEDURE — 93306 TTE W/DOPPLER COMPLETE: CPT | Performed by: INTERNAL MEDICINE

## 2022-09-13 LAB
AORTIC ROOT ANNULUS: 3.2 CM
AORTIC VALVE MEAN VELOCITY: 0.89 M/S
AORTIC VALVE VELOCITY TIME INTEGRAL: 27.9 CM
ASCENDING AORTA: 4 CM
AV MEAN GRADIENT: 4 MMHG
AV PEAK GRADIENT: 8 MMHG
AV PEAK VELOCITY-S: 1.4 M/S
AV VALVE AREA: 2.49 CM2
BSA FOR ECHO PROCEDURE: 1.85 M2
CUSP SEPARATION: 2 CM
DOP CALC LVOT STROKE VOLUME: 69.58 ML
E WAVE DECELERATION TIME: 246 MS
E/A RATIO: 0.9
E/E' RATIO: 8
E/LAT E' RATIO: 6.9
EDV (BP): 63.1 CM3
EF (A4C): 56 %
EF A2C: 65.7 %
EJECTION FRACTION: 60.4 %
ESV (BP): 25 CM3
FRACTIONAL SHORTENING: 31.7 %
INTERVENTRICULAR SEPTUM: 0.81 CM
LA ESV (BP): 54.6 CM3
LA ESV INDEX (A2C): 28.05 CM3/M2
LA ESV INDEX (BP): 29.51 CM3/M2
LA/AORTA RATIO: 1.25
LAAS-AP2: 19.2 CM2
LAAS-AP4: 19.6 CM2
LAD 2D: 4 CM
LALD A4C: 5.36 CM
LALD A4C: 5.57 CM
LAV-S: 51.9 CM3
LEFT ATRIUM VOLUME INDEX: 30.11 CM3/M2
LEFT ATRIUM VOLUME: 55.7 CM3
LEFT INTERNAL DIMENSION IN SYSTOLE: 3.23 CM (ref 2.5–3.78)
LEFT VENTRICLE DIASTOLIC VOLUME INDEX: 31.68 CM3/M2
LEFT VENTRICLE DIASTOLIC VOLUME: 58.6 CM3
LEFT VENTRICLE SYSTOLIC VOLUME INDEX: 13.95 CM3/M2
LEFT VENTRICLE SYSTOLIC VOLUME: 25.8 CM3
LEFT VENTRICULAR INTERNAL DIMENSION IN DIASTOLE: 4.73 CM (ref 4.23–5.87)
LEFT VENTRICULAR POSTERIOR WALL IN END DIASTOLE: 0.71 CM (ref 0.55–1.03)
LV DIASTOLIC VOLUME: 66.4 CM3
LV ESV (APICAL 2 CHAMBER): 22.8 CM3
LVAD-AP2: 25.2 CM2
LVAD-AP4: 24.3 CM2
LVAS-AP2: 13.6 CM2
LVAS-AP4: 15 CM2
LVEDVI(A2C): 35.89 CM3/M2
LVEDVI(BP): 34.11 CM3/M2
LVESVI(A2C): 12.32 CM3/M2
LVESVI(BP): 13.51 CM3/M2
LVLD-AP2: 8.14 CM
LVLD-AP4: 8.34 CM
LVLS-AP2: 6.85 CM
LVLS-AP4: 7.31 CM
LVOT 2D: 2.1 CM
LVOT A: 3.46 CM2
LVOT MG: 2 MMHG
LVOT MV: 0.59 M/S
LVOT PEAK VELOCITY: 1.03 M/S
LVOT VTI: 20.1 CM
MV E'TISSUE VEL-LAT: 0.12 M/S
MV E'TISSUE VEL-MED: 0.1 M/S
MV PEAK A VEL: 0.84 M/S
MV PEAK E VEL: 0.79 M/S
MV VALVE AREA P 1/2 METHOD: 3.06 CM2
POSTERIOR WALL: 0.71 CM
RVOT VMAX: 0.72 M/S
SEPTAL TISSUE DOPPLER FREE WALL LATE DIA VELOCITY (APICAL 4 CHAMBER VIEW): 0.1 M/S
Z-SCORE OF LEFT VENTRICULAR DIMENSION IN END DIASTOLE: -0.51
Z-SCORE OF LEFT VENTRICULAR DIMENSION IN END SYSTOLE: 0.39
Z-SCORE OF LEFT VENTRICULAR POSTERIOR WALL IN END DIASTOLE: -0.32

## 2022-09-14 ENCOUNTER — TELEPHONE (OUTPATIENT)
Dept: CARDIOLOGY | Facility: CLINIC | Age: 52
End: 2022-09-14
Payer: COMMERCIAL

## 2022-09-14 NOTE — TELEPHONE ENCOUNTER
----- Message from Orly Lara sent at 9/14/2022  3:55 PM EDT -----  Regarding: Colonoscopy clearance question  Hi Dr. Dumont,     My instructions say to consult with my cardiologist regarding holding my aspirin prior to my procedure.    Can you please advise if I should stop taking it.    Thank you

## 2022-10-13 ENCOUNTER — OFFICE VISIT (OUTPATIENT)
Dept: FAMILY MEDICINE | Facility: CLINIC | Age: 52
End: 2022-10-13
Payer: COMMERCIAL

## 2022-10-13 VITALS
SYSTOLIC BLOOD PRESSURE: 110 MMHG | HEIGHT: 63 IN | BODY MASS INDEX: 29.94 KG/M2 | HEART RATE: 64 BPM | DIASTOLIC BLOOD PRESSURE: 70 MMHG | TEMPERATURE: 97.9 F | OXYGEN SATURATION: 97 %

## 2022-10-13 DIAGNOSIS — N30.01 ACUTE CYSTITIS WITH HEMATURIA: Primary | ICD-10-CM

## 2022-10-13 DIAGNOSIS — R35.0 URINARY FREQUENCY: ICD-10-CM

## 2022-10-13 LAB
BILIRUBIN, POC: NEGATIVE
BLOOD URINE, POC: POSITIVE
CLARITY, POC: CLEAR
COLOR, POC: YELLOW
EXPIRATION DATE: ABNORMAL
GLUCOSE URINE, POC: NEGATIVE
KETONES, POC: NEGATIVE
LEUKOCYTE EST, POC: ABNORMAL
Lab: ABNORMAL
NITRITE, POC: ABNORMAL
PH, POC: 8
POCT MANUFACTURER: ABNORMAL
PROTEIN, POC: ABNORMAL
SPECIFIC GRAVITY, POC: 1

## 2022-10-13 PROCEDURE — 81002 URINALYSIS NONAUTO W/O SCOPE: CPT | Performed by: FAMILY MEDICINE

## 2022-10-13 PROCEDURE — 3008F BODY MASS INDEX DOCD: CPT | Performed by: FAMILY MEDICINE

## 2022-10-13 PROCEDURE — 99213 OFFICE O/P EST LOW 20 MIN: CPT | Performed by: FAMILY MEDICINE

## 2022-10-13 RX ORDER — SULFAMETHOXAZOLE AND TRIMETHOPRIM 800; 160 MG/1; MG/1
1 TABLET ORAL 2 TIMES DAILY
Qty: 6 TABLET | Refills: 0 | Status: SHIPPED | OUTPATIENT
Start: 2022-10-13 | End: 2022-10-16

## 2022-10-13 RX ORDER — FLUCONAZOLE 150 MG/1
150 TABLET ORAL DAILY
Qty: 1 TABLET | Refills: 1 | Status: SHIPPED | OUTPATIENT
Start: 2022-10-13 | End: 2022-10-14

## 2022-10-13 NOTE — PROGRESS NOTES
HPI:  Orly Lara is an 52 y.o. female presenting for 1-2 days of urinary urgency and dysuria. Noted pink color in urine and dark blood  + chills, Tm 99.6  No nausea/vomiting     Good water intake/does not hold urine.    Allergies:  Iodinated contrast media and Lipitor [atorvastatin]    Past Medical History:   Diagnosis Date    Aneurysm (CMS/HCC)     Hypertension     Lipid disorder     Positional vertigo     Stroke (CMS/HCC)        Past Surgical History:   Procedure Laterality Date    CARDIAC SURGERY      HYSTEROSCOPY W/ ENDOMETRIAL ABLATION      PATENT FORAMEN OVALE CLOSURE      SKIN BIOPSY         Social History     Tobacco Use    Smoking status: Former     Types: Cigarettes     Quit date:      Years since quittin.7    Smokeless tobacco: Never   Substance Use Topics    Alcohol use: Yes     Comment: 10 drinks 2 days per week       Current Outpatient Medications on File Prior to Visit   Medication Sig Dispense Refill    ascorbic acid (VITAMIN C) 500 mg tablet Take 500 mg by mouth daily.      aspirin 81 mg enteric coated tablet Take 1 tablet (81 mg total) by mouth daily. 90 tablet 1    hydrochlorothiazide (HYDRODIURIL) 25 mg tablet Take 1 tablet (25 mg total) by mouth daily. 90 tablet 3    Lactobacillus acidophilus (PROBIOTIC ORAL) Take 1 capsule by mouth daily.      MAGNESIUM ORAL Take 30 mg by mouth daily.      omega-3 fatty acids (FISH OIL CONCENTRATE ORAL) Take by mouth.      rosuvastatin 20 mg tablet TAKE ONE TABLET (20 MG TOTAL) BY MOUTH DAILY. 90 tablet 3    valACYclovir (VALTREX) 1 gram tablet Take 2 tablets (2,000 mg total) by mouth 2 (two) times a day for 2 doses. 30 tablet 0     No current facility-administered medications on file prior to visit.       Family History   Problem Relation Age of Onset    Heart attack Biological Mother     Hyperlipidemia Biological Mother     Stroke Biological Mother     Heart disease Biological Father        Visit Vitals  /70   Pulse 64  "  Temp 36.6 °C (97.9 °F) (Oral)   Ht 1.6 m (5' 3\")   SpO2 97%   BMI 29.94 kg/m²        Physical Exam  Constitutional:       Appearance: Normal appearance.   Cardiovascular:      Rate and Rhythm: Normal rate and regular rhythm.      Heart sounds: Normal heart sounds.   Pulmonary:      Breath sounds: Normal breath sounds. No wheezing, rhonchi or rales.   Abdominal:      General: There is no distension.      Palpations: Abdomen is soft. There is no mass.      Tenderness: There is no abdominal tenderness. There is no right CVA tenderness, left CVA tenderness or guarding.   Skin:     General: Skin is warm and dry.   Neurological:      Mental Status: She is alert.           A/P  1. Acute cystitis with hematuria  Urine dip positive for nitrites and blood, supportive care  - POCT urinalysis dipstick  - Urinalysis with microscopic  - Urine culture  - sulfamethoxazole-trimethoprim (BACTRIM DS) 800-160 mg per tablet; Take 1 tablet by mouth 2 (two) times a day for 3 days.  Dispense: 6 tablet; Refill: 0  - fluconazole (DIFLUCAN) 150 mg tablet; Take 1 tablet (150 mg total) by mouth daily for 1 day.  Dispense: 1 tablet; Refill: 1    2. Urinary frequency    - POCT urinalysis dipstick  - Urinalysis with microscopic  - Urine culture  - sulfamethoxazole-trimethoprim (BACTRIM DS) 800-160 mg per tablet; Take 1 tablet by mouth 2 (two) times a day for 3 days.  Dispense: 6 tablet; Refill: 0  - fluconazole (DIFLUCAN) 150 mg tablet; Take 1 tablet (150 mg total) by mouth daily for 1 day.  Dispense: 1 tablet; Refill: 1          Next appointment recommended:  prn      The patient (parent) indicates an understanding of the events of today's medical evaluation and agrees to the plan of care.    I have asked the patient (parent) to be on the alert for new or worsening symptoms and to call the office directly or proceed to the nearest ER if such should occur.    Total time spent on day on encounter 20 minutes.  "

## 2022-10-14 LAB
APPEARANCE UR: CLEAR
BACTERIA #/AREA URNS HPF: NORMAL /[HPF]
BILIRUB UR QL STRIP: NEGATIVE
CASTS URNS QL MICRO: NORMAL /LPF
COLOR UR: YELLOW
EPI CELLS #/AREA URNS HPF: NORMAL /HPF (ref 0–10)
GLUCOSE UR QL STRIP: NEGATIVE
HGB UR QL STRIP: ABNORMAL
KETONES UR QL STRIP: NEGATIVE
LEUKOCYTE ESTERASE UR QL STRIP: ABNORMAL
MICRO URNS: ABNORMAL
NITRITE UR QL STRIP: NEGATIVE
PH UR STRIP: 7.5 [PH] (ref 5–7.5)
PROT UR QL STRIP: NEGATIVE
RBC #/AREA URNS HPF: NORMAL /HPF (ref 0–2)
SP GR UR STRIP: 1.01 (ref 1–1.03)
UROBILINOGEN UR STRIP-MCNC: 0.2 MG/DL (ref 0.2–1)
WBC #/AREA URNS HPF: NORMAL /HPF (ref 0–5)

## 2022-10-17 LAB
BACTERIA UR CULT: ABNORMAL
BACTERIA UR CULT: ABNORMAL
OTHER ANTIBIOTIC SUSC ISLT: ABNORMAL

## 2022-11-02 ENCOUNTER — APPOINTMENT (RX ONLY)
Dept: URBAN - METROPOLITAN AREA CLINIC 26 | Facility: CLINIC | Age: 52
Setting detail: DERMATOLOGY
End: 2022-11-02

## 2022-11-02 DIAGNOSIS — L57.8 OTHER SKIN CHANGES DUE TO CHRONIC EXPOSURE TO NONIONIZING RADIATION: ICD-10-CM

## 2022-11-02 DIAGNOSIS — L81.3 CAFÉ AU LAIT SPOTS: ICD-10-CM

## 2022-11-02 DIAGNOSIS — D22 MELANOCYTIC NEVI: ICD-10-CM

## 2022-11-02 DIAGNOSIS — D18.0 HEMANGIOMA: ICD-10-CM

## 2022-11-02 DIAGNOSIS — L82.1 OTHER SEBORRHEIC KERATOSIS: ICD-10-CM

## 2022-11-02 PROBLEM — D22.72 MELANOCYTIC NEVI OF LEFT LOWER LIMB, INCLUDING HIP: Status: ACTIVE | Noted: 2022-11-02

## 2022-11-02 PROBLEM — D22.5 MELANOCYTIC NEVI OF TRUNK: Status: ACTIVE | Noted: 2022-11-02

## 2022-11-02 PROBLEM — D18.01 HEMANGIOMA OF SKIN AND SUBCUTANEOUS TISSUE: Status: ACTIVE | Noted: 2022-11-02

## 2022-11-02 PROCEDURE — ? COUNSELING

## 2022-11-02 PROCEDURE — ? ADDITIONAL NOTES

## 2022-11-02 PROCEDURE — ? PATIENT SPECIFIC COUNSELING

## 2022-11-02 PROCEDURE — ? SUNSCREEN RECOMMENDATIONS

## 2022-11-02 PROCEDURE — ? FULL BODY SKIN EXAM

## 2022-11-02 PROCEDURE — 99213 OFFICE O/P EST LOW 20 MIN: CPT

## 2022-11-02 ASSESSMENT — LOCATION DETAILED DESCRIPTION DERM
LOCATION DETAILED: INFERIOR THORACIC SPINE
LOCATION DETAILED: LEFT PROXIMAL POSTERIOR THIGH
LOCATION DETAILED: RIGHT LATERAL BUCCAL CHEEK
LOCATION DETAILED: LEFT SUPERIOR MEDIAL LOWER BACK

## 2022-11-02 ASSESSMENT — LOCATION SIMPLE DESCRIPTION DERM
LOCATION SIMPLE: LEFT LOWER BACK
LOCATION SIMPLE: RIGHT CHEEK
LOCATION SIMPLE: UPPER BACK
LOCATION SIMPLE: LEFT POSTERIOR THIGH

## 2022-11-02 ASSESSMENT — LOCATION ZONE DERM
LOCATION ZONE: FACE
LOCATION ZONE: TRUNK
LOCATION ZONE: LEG

## 2022-11-28 RX ORDER — VALACYCLOVIR HYDROCHLORIDE 1 G/1
TABLET, FILM COATED ORAL
Qty: 30 TABLET | Refills: 0 | Status: SHIPPED | OUTPATIENT
Start: 2022-11-28 | End: 2023-06-19

## 2022-12-16 ENCOUNTER — TELEMEDICINE (OUTPATIENT)
Dept: FAMILY MEDICINE | Facility: CLINIC | Age: 52
End: 2022-12-16
Payer: COMMERCIAL

## 2022-12-16 DIAGNOSIS — U07.1 COVID: Primary | ICD-10-CM

## 2022-12-16 PROCEDURE — 99212 OFFICE O/P EST SF 10 MIN: CPT | Mod: 95 | Performed by: FAMILY MEDICINE

## 2022-12-16 NOTE — PROGRESS NOTES
Verification of Patient Location:  The patient affirms they are currently located in the following state: [place]    Request for Consent:    Audio and Video Encounter   Kody, my name is Mimi MORANBetsy Kramer DO.  Before we proceed, can you please verify your identification by telling me your full name and date of birth?  Can you tell me who is in the room with you?    You and I are about to have a telemedicine check-in or visit because you have requested it.  This is a live video-conference.  I am a real person, speaking to you in real time.  There is no one else with me on the video-conference. I am not recording this conversation and I am asking you not to record it.  This telemedicine visit will be billed to your health insurance or you, if you are self-insured.  You understand you will be responsible for any copayments or coinsurances that apply to your telemedicine visit.  Communication platform used for this encounter:  IMASTE Video Visit (Epic Video Client)       Before starting our telemedicine visit, I am required to get your consent for this virtual check-in or visit by telemedicine. Do you consent?      Patient Response to Request for Consent:  Yes      Visit Documentation:  Subjective     Patient ID: Orly Lara is a 52 y.o. female.  1970      HPI Patient is presenting for symptoms starting 12/12 late nasal and chest congestion, mild fever.  Tested positive for COVID 12/14.  Developed myalgias.  No shortness of breath but feels chest heaviness.  Home pulse ox 95-96%    Today feeling better compared to yesterday.    The following have been reviewed and updated as appropriate in this visit:   Tobacco  Allergies  Meds  Problems  Med Hx  Surg Hx  Fam Hx  Soc   Hx    PE:On physical examination today, s/he appears well and is not in any distress.       General:  No acute distress, not anxious appearing, speaking comfortably  HEENT: Normocephalic, conjunctiva clear, lips not swollen, not  cyanotic, no visible neck masses  Respiratory:  Good inspiratory effort, no use of accessory muscles, no increased work of breathing  Skin: No rash on exposed areas  Neuro: Alert and oriented x 3, interactive  Psych: Pleasant, normal affect    Assessment/Plan   Diagnoses and all orders for this visit:    COVID (Primary)      Discussed risks and benefits of Paxlovid.   Patient declines Paxlovid.   Continue supportive care.  Reviewed signs/symptoms which would require immediate medical attention.    Time Spent:  I spent 10 minutes on this date of service performing the following activities: obtaining history, documenting, preparing for visit and providing counseling and education.

## 2023-02-13 ENCOUNTER — OFFICE VISIT (OUTPATIENT)
Dept: FAMILY MEDICINE | Facility: CLINIC | Age: 53
End: 2023-02-13
Payer: COMMERCIAL

## 2023-02-13 VITALS
WEIGHT: 179.8 LBS | DIASTOLIC BLOOD PRESSURE: 94 MMHG | BODY MASS INDEX: 31.86 KG/M2 | TEMPERATURE: 97.8 F | HEART RATE: 61 BPM | SYSTOLIC BLOOD PRESSURE: 119 MMHG | OXYGEN SATURATION: 98 % | HEIGHT: 63 IN

## 2023-02-13 DIAGNOSIS — I67.1 BRAIN ANEURYSM: ICD-10-CM

## 2023-02-13 DIAGNOSIS — Q21.10 ATRIAL SEPTAL DEFECT: ICD-10-CM

## 2023-02-13 DIAGNOSIS — J01.90 ACUTE NON-RECURRENT SINUSITIS, UNSPECIFIED LOCATION: Primary | ICD-10-CM

## 2023-02-13 DIAGNOSIS — Q21.10 ASD (ATRIAL SEPTAL DEFECT): ICD-10-CM

## 2023-02-13 PROBLEM — I47.10 SVT (SUPRAVENTRICULAR TACHYCARDIA) (CMS/HCC): Status: RESOLVED | Noted: 2018-12-14 | Resolved: 2023-02-13

## 2023-02-13 PROCEDURE — 3008F BODY MASS INDEX DOCD: CPT | Performed by: FAMILY MEDICINE

## 2023-02-13 PROCEDURE — 99213 OFFICE O/P EST LOW 20 MIN: CPT | Performed by: FAMILY MEDICINE

## 2023-02-13 RX ORDER — AMOXICILLIN AND CLAVULANATE POTASSIUM 875; 125 MG/1; MG/1
1 TABLET, FILM COATED ORAL 2 TIMES DAILY
Qty: 14 TABLET | Refills: 0 | Status: SHIPPED | OUTPATIENT
Start: 2023-02-13 | End: 2023-02-15

## 2023-02-13 RX ORDER — FLUCONAZOLE 150 MG/1
150 TABLET ORAL DAILY
Qty: 1 TABLET | Refills: 1 | Status: SHIPPED | OUTPATIENT
Start: 2023-02-13 | End: 2023-02-14

## 2023-02-14 ENCOUNTER — TELEPHONE (OUTPATIENT)
Dept: FAMILY MEDICINE | Facility: CLINIC | Age: 53
End: 2023-02-14
Payer: COMMERCIAL

## 2023-02-14 NOTE — TELEPHONE ENCOUNTER
I spoke to Orly last night. She had an immediate skin rash from Augmentin. She was told to stop, drink fluids and take Benedryl. I called this morning to check in. She is much improved. She will continue with Benedryl and speak to Dr Kramer tomorrow about antibiotics.

## 2023-02-15 NOTE — TELEPHONE ENCOUNTER
Spoke with patient after taking Augmentin throat and lips swollen with trouble swallowing, skin turned red like a sunburn, pruritic.  Took Benadryl - improved after 30 minutes.    URI symptoms are improved. Does not require an alternative antibiotic

## 2023-06-19 RX ORDER — VALACYCLOVIR HYDROCHLORIDE 1 G/1
TABLET, FILM COATED ORAL
Qty: 30 TABLET | Refills: 0 | Status: SHIPPED | OUTPATIENT
Start: 2023-06-19 | End: 2023-12-13 | Stop reason: SDUPTHER

## 2023-06-23 ENCOUNTER — TELEPHONE (OUTPATIENT)
Dept: SCHEDULING | Facility: CLINIC | Age: 53
End: 2023-06-23

## 2023-06-25 RX ORDER — HYDROCHLOROTHIAZIDE 25 MG/1
TABLET ORAL
Qty: 90 TABLET | Refills: 3 | OUTPATIENT
Start: 2023-06-25

## 2023-06-26 NOTE — TELEPHONE ENCOUNTER
Pt calling to request an appt for a f/u visit from a PSO closure.  Pt was advised to f/u in March 2019  No avail until 4/16.    Pls contact pt at  to schedule    While on the unit, patient was pleasant and cooperative. Patient was selective with peers but engaged with all when approached. Patient stated his mood upon admission was 4 or 5 out of 10 (10 being happy, content). Now, patient rates his mood as 8 or 9 out of 10. Patient was not prescribed mediation while on the unit. Patient's goal was to identify and utilize 2 coping skills more interpersonal communication in particular. Patient was able to identify BRI as the most helpful. Patient could not remember the other skills but had made note of them that he wanted to use them.         Patient denied SI/HI/NSSI/AH/VH.

## 2023-06-26 NOTE — TELEPHONE ENCOUNTER
DR WAGNER   pt--pharmacy requests refill HCTZ    Last OV was 1/2/2021   no appts scheduled    Notified Pharmacy to advise PT to call our office for an office visit for evaluation and any refills    Not escribed.    Please advise of any changes.    Thank you    PSR pool--please reach out to PT to make an appointment for evaluation and refills--Thank you

## 2023-09-13 ENCOUNTER — TELEPHONE (OUTPATIENT)
Dept: CARDIOLOGY | Facility: CLINIC | Age: 53
End: 2023-09-13
Payer: COMMERCIAL

## 2023-09-13 NOTE — TELEPHONE ENCOUNTER
Patient sent us a portal message that she need a cardiac clearance visit for her procedure scheduled on 10/26. I LM for patient that  I scheduled her for 9/28 at 8;30 am at PM office. I asked to please call back to confirm the appt.

## 2023-09-14 DIAGNOSIS — I77.810 DILATED AORTIC ROOT (CMS/HCC): Primary | ICD-10-CM

## 2023-09-15 ENCOUNTER — TELEPHONE (OUTPATIENT)
Dept: SCHEDULING | Facility: CLINIC | Age: 53
End: 2023-09-15
Payer: COMMERCIAL

## 2023-09-19 ENCOUNTER — HOSPITAL ENCOUNTER (OUTPATIENT)
Dept: CARDIOLOGY | Facility: CLINIC | Age: 53
Discharge: HOME | End: 2023-09-19
Attending: INTERNAL MEDICINE
Payer: COMMERCIAL

## 2023-09-19 VITALS
BODY MASS INDEX: 32.78 KG/M2 | WEIGHT: 185 LBS | DIASTOLIC BLOOD PRESSURE: 80 MMHG | HEIGHT: 63 IN | SYSTOLIC BLOOD PRESSURE: 120 MMHG

## 2023-09-19 DIAGNOSIS — I77.810 DILATED AORTIC ROOT (CMS/HCC): ICD-10-CM

## 2023-09-19 LAB
AORTIC ROOT ANNULUS: 3.3 CM
ASCENDING AORTA: 4 CM
AV PEAK GRADIENT: 6 MMHG
AV PEAK VELOCITY-S: 1.25 M/S
AV VALVE AREA: 2.05 CM2
BSA FOR ECHO PROCEDURE: 1.93 M2
CUSP SEPARATION: 1.9 CM
E WAVE DECELERATION TIME: 277 MS
E/A RATIO: 0.8
E/E' RATIO: 10.3
E/LAT E' RATIO: 6.9
EDV (BP): 72.6 CM3
EF (A4C): 60.3 %
EF A2C: 66.6 %
EJECTION FRACTION: 63.6 %
ESV (BP): 26.4 CM3
FRACTIONAL SHORTENING: 35.75 %
INTERVENTRICULAR SEPTUM: 0.83 CM
LA ESV (BP): 24.7 CM3
LA ESV INDEX (A2C): 13.16 CM3/M2
LA ESV INDEX (BP): 12.8 CM3/M2
LA/AORTA RATIO: 1.09
LAAS-AP2: 12.3 CM2
LAAS-AP4: 10.5 CM2
LAD 2D: 3.6 CM
LALD A4C: 4.26 CM
LALD A4C: 4.81 CM
LAV-S: 25.4 CM3
LEFT ATRIUM VOLUME INDEX: 11.3 CM3/M2
LEFT ATRIUM VOLUME: 21.8 CM3
LEFT INTERNAL DIMENSION IN SYSTOLE: 2.75 CM (ref 2.61–3.96)
LEFT VENTRICLE DIASTOLIC VOLUME INDEX: 38.13 CM3/M2
LEFT VENTRICLE DIASTOLIC VOLUME: 73.6 CM3
LEFT VENTRICLE SYSTOLIC VOLUME INDEX: 15.13 CM3/M2
LEFT VENTRICLE SYSTOLIC VOLUME: 29.2 CM3
LEFT VENTRICULAR INTERNAL DIMENSION IN DIASTOLE: 4.28 CM (ref 4.42–6.14)
LEFT VENTRICULAR POSTERIOR WALL IN END DIASTOLE: 0.76 CM (ref 0.58–1.08)
LV DIASTOLIC VOLUME: 65.4 CM3
LV ESV (APICAL 2 CHAMBER): 21.9 CM3
LVAD-AP2: 24.2 CM2
LVAD-AP4: 27.4 CM2
LVAS-AP2: 13.2 CM2
LVAS-AP4: 15.9 CM2
LVEDVI(A2C): 33.89 CM3/M2
LVEDVI(BP): 37.62 CM3/M2
LVESVI(A2C): 11.35 CM3/M2
LVESVI(BP): 13.68 CM3/M2
LVLD-AP2: 7.57 CM
LVLD-AP4: 8.47 CM
LVLS-AP2: 6.73 CM
LVLS-AP4: 7.36 CM
LVOT 2D: 2.2 CM
LVOT A: 3.8 CM2
LVOT PEAK VELOCITY: 0.86 M/S
LVOT PG: 3 MMHG
MLH CV ECHO AVA INDEX VELOCITY RATIO: 1.1
MV E'TISSUE VEL-LAT: 0.08 M/S
MV E'TISSUE VEL-MED: 0.05 M/S
MV PEAK A VEL: 0.68 M/S
MV PEAK E VEL: 0.56 M/S
POSTERIOR WALL: 0.76 CM
RVOT VMAX: 0.56 M/S
SEPTAL TISSUE DOPPLER FREE WALL LATE DIA VELOCITY (APICAL 4 CHAMBER VIEW): 0.1 M/S
Z-SCORE OF LEFT VENTRICULAR DIMENSION IN END DIASTOLE: -1.97
Z-SCORE OF LEFT VENTRICULAR DIMENSION IN END SYSTOLE: -1.23
Z-SCORE OF LEFT VENTRICULAR POSTERIOR WALL IN END DIASTOLE: -0.2

## 2023-09-19 PROCEDURE — 93306 TTE W/DOPPLER COMPLETE: CPT | Performed by: INTERNAL MEDICINE

## 2023-09-25 ASSESSMENT — ENCOUNTER SYMPTOMS
INSOMNIA: 0
WEIGHT LOSS: 0
CONSTIPATION: 0
HEMATOLOGIC/LYMPHATIC NEGATIVE: 1
ORTHOPNEA: 0
TREMORS: 0
FREQUENCY: 0
WEIGHT GAIN: 0
MYALGIAS: 0
DYSPNEA ON EXERTION: 0
DIZZINESS: 0
NAUSEA: 0
VERTIGO: 1
ANOREXIA: 0
NUMBNESS: 0
IRREGULAR HEARTBEAT: 0
CHANGE IN BOWEL HABIT: 0
COUGH: 0
BRUISES/BLEEDS EASILY: 0
HEMOPTYSIS: 0
SPUTUM PRODUCTION: 0
HEARTBURN: 0
LIGHT-HEADEDNESS: 0
MEMORY LOSS: 0
DIARRHEA: 0
CLAUDICATION: 0
NEAR-SYNCOPE: 0
FOCAL WEAKNESS: 0
PND: 0
NERVOUS/ANXIOUS: 0
SHORTNESS OF BREATH: 0
FALLS: 0
ABDOMINAL PAIN: 0
JAUNDICE: 0
SLEEP DISTURBANCES DUE TO BREATHING: 0
SNORING: 0
WHEEZING: 0
MUSCLE CRAMPS: 0
HEADACHES: 0
SYNCOPE: 0
PALPITATIONS: 0
HOARSE VOICE: 0

## 2023-09-25 NOTE — PROGRESS NOTES
Mimi Kramer DO          Orly Lara is a 53 y.o. female She returns for follow-up and echocardiogram  She was last seen December 2021.  She is here for cardiac evaluation prior to breast reduction surgery  Dr viraj Tidwell   She returns after echocardiogram performed September 2023  She presented in 2019 with cryptogenic right posterior stroke diagnosed with a PFO and had Amplatzer percutaneous repair  .  History of CVA cerebellar infarction 2019  She has familial hyperlipidemia lab work from last year HDL 80  she is evaluated by [facility] clinic in the past, Dr. Rausch she will be following   She stopped her Crestor in August due to muscle aches  She has peripheral vascular disease plaque was seen on thoracic aorta in the past  No known coronary artery disease with calcium score of 0 2019  3 mm brain aneurysm left-sided internal carotid aneurysm follows with [facility] last assessed May 2023  Mildly dilated aortic root 4.0 last imaged September 2023 by echo  Amplatzer device stable ejection fraction 60%                           Ref Range & Units 6/10/20 0911 Comments     Cholesterol, Total 100 - 199 mg/dL 317High       Triglycerides 0 - 149 mg/dL 97      HDL Cholesterol >39 mg/dL 85      VLDL Cholesterol Sahil 5 - 40 mg/dL 19      LDL Cholesterol Calc 0 - 99 mg/dL 213High        Gluc 109      Instructions                                                        Patient Active Problem List    Diagnosis Date Noted    Pre-op evaluation 09/28/2023    ASD (atrial septal defect) 02/13/2023    H/O Amplatzer atrial septal defect closure 12/14/2020    Family history of premature coronary artery disease 12/14/2020    Statin intolerance 06/10/2020    Hyperglycemia 08/02/2019    Dilated aortic root (CMS/HCC) 08/02/2019    PVD (peripheral vascular disease) (CMS/[facility]) 08/02/2019    Cerebellar infarct (CMS/HCC) 05/23/2019    Brain aneurysm 05/23/2019    PFO (patent foramen ovale) 11/30/2018     Vestibular disequilibrium, left 2016    Familial hyperlipidemia 2014       Medical History:   Past Medical History:   Diagnosis Date    Aneurysm (CMS/HCC)     Hypertension     Lipid disorder     Positional vertigo     Stroke (CMS/HCC)        Surgical History:   Past Surgical History:   Procedure Laterality Date    CARDIAC SURGERY      HYSTEROSCOPY W/ ENDOMETRIAL ABLATION      PATENT FORAMEN OVALE CLOSURE      SKIN BIOPSY         Allergies: Augmentin [amoxicillin-pot clavulanate], Iodinated contrast media, Lipitor [atorvastatin], and Penicillin g benzathine    Current Outpatient Medications   Medication Sig Dispense Refill    ascorbic acid (VITAMIN C) 500 mg tablet Take 500 mg by mouth daily.      aspirin 81 mg enteric coated tablet Take 1 tablet (81 mg total) by mouth daily. 90 tablet 1    diphenhydrAMINE (BENADRYL) 50 mg capsule Take 1 capsule (50 mg total) by mouth once for 1 dose. Take 1 tab 1 hr before exam (with last dose of prednisone). 1 capsule 0    hydrochlorothiazide (HYDRODIURIL) 25 mg tablet Take 1 tablet (25 mg total) by mouth daily. 90 tablet 3    Lactobacillus acidophilus (PROBIOTIC ORAL) Take 1 capsule by mouth daily.      omega-3 fatty acids (FISH OIL CONCENTRATE ORAL) Take by mouth.      valACYclovir (VALTREX) 1 gram tablet TAKE TWO TABLETS (2,000 MG TOTAL) BY MOUTH TWO (2) TIMES A DAY FOR TWO DOSES. 30 tablet 0     No current facility-administered medications for this visit.       Social History:   Social History     Socioeconomic History    Marital status:      Spouse name: None    Number of children: None    Years of education: None    Highest education level: None   Tobacco Use    Smoking status: Former     Types: Cigarettes     Quit date:      Years since quittin.7    Smokeless tobacco: Never   Vaping Use    Vaping Use: Never used   Substance and Sexual Activity    Alcohol use: Yes     Comment: 10 drinks 2 days per week    Drug use: Not  Currently    Sexual activity: Yes     Partners: Male   Social History Narrative    Do you wear your seatbelt? Yes    Do you have smoke detector in your home? Yes    Do you have a carbon monoxide detector in your home? 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    Current Marital Status?        Family History:   Family History   Problem Relation Age of Onset    Heart attack Biological Mother     Hyperlipidemia Biological Mother     Stroke Biological Mother     Heart disease Biological Father        Review of Systems   Review of Systems   Constitutional: Negative for malaise/fatigue, weight gain and weight loss.   HENT: Negative for hearing loss and hoarse voice.    Eyes: Negative for visual disturbance.   Cardiovascular: Negative for chest pain, claudication, cyanosis, dyspnea on exertion, irregular heartbeat, leg swelling, near-syncope, orthopnea, palpitations, paroxysmal nocturnal dyspnea and syncope.   Respiratory: Negative for cough, hemoptysis, shortness of breath, sleep disturbances due to breathing, snoring, sputum production and wheezing.    Endocrine: Negative for cold intolerance and heat intolerance.   Hematologic/Lymphatic: Negative.  Negative for bleeding problem. Does not bruise/bleed easily.   Skin: Negative.  Negative for rash.   Musculoskeletal: Negative for arthritis, falls, joint pain, muscle cramps and myalgias.   Gastrointestinal: Negative for abdominal pain, anorexia, change in bowel habit, constipation, diarrhea, dysphagia, heartburn, jaundice and nausea.   Genitourinary: Negative for frequency and nocturia.   Neurological: Positive for vertigo. Negative for dizziness, focal weakness, headaches, light-headedness, numbness and tremors.   Psychiatric/Behavioral: Negative for memory loss. The patient does not have insomnia and is not nervous/anxious.    Allergic/Immunologic: Negative for hives.       Objective       Vitals:    09/28/23 0859   BP: 130/80   Pulse:    SpO2:         Physical Exam  Constitutional:       General: She is not in acute distress.     Appearance: She is well-developed.   HENT:      Head: Normocephalic and atraumatic.      Nose: Nose normal.   Eyes:      General: No scleral icterus.     Conjunctiva/sclera: Conjunctivae normal.   Neck:      Vascular: No JVD.   Cardiovascular:      Rate and Rhythm: Normal rate and regular rhythm.      Pulses: Intact distal pulses.      Heart sounds: Normal heart sounds. No murmur heard.     No friction rub. No gallop.   Pulmonary:      Effort: Pulmonary effort is normal. No respiratory distress.      Breath sounds: No stridor. No wheezing or rales.   Chest:      Chest wall: No tenderness.   Abdominal:      Tenderness: There is no abdominal tenderness.   Musculoskeletal:         General: No deformity.   Skin:     General: Skin is warm and dry.   Neurological:      Mental Status: She is alert and oriented to person, place, and time.          Labs   Lab Results   Component Value Date    WBC 6.5 08/23/2022    HGB 12.6 08/23/2022    HCT 37.2 08/23/2022    PLT CANCELED 08/23/2022    CHOL 229 (H) 08/23/2022    TRIG 80 08/23/2022    HDL 88 08/23/2022    ALT 20 08/23/2022    AST 24 08/23/2022     08/23/2022    K 4.2 08/23/2022    CL 99 08/23/2022    CREATININE 0.89 08/23/2022    BUN 10 08/23/2022    CO2 23 08/23/2022    TSH 1.920 11/13/2020    INR 0.9 02/07/2019    HGBA1C 5.8 (H) 08/23/2022   [id number]                      Imaging    ECHO  Gavin MILD PLAQUE AORTA 11/18 PFO    ECHO 12/20 TTE AMPLATZER DEVICE NO FLOW NL EF        CATH/cv surgery  PFO REPAIR 2/19 AMPLATZER DEVICE DR MARROQUIN    CAT SCAN  Cor garett score zero 2015 thymus tissue aorta normal    Coronary calcium score August 2019 zero    cta 4/2021 aorta 4.0 cm    \        ECHO  Gavin 11/18 EF 65%  PFO with left-to-right shunt plaque seen on aorta  agitated saline.       1. Normal left ventricular wall thickness and cavity with preserved systolic function. Estimated EF 65%. No regional  wall motion abnormalities.   2. Three cusped aortic valve.  Mild aortic regurgitation.  Normal aortic dimensions. Simple atheroma in the visualized portions of the descending aorta without complex elements.   3. Normal mitral valve.  Trace mitral regurgitation.  Systolic predominance of pulmonary vein flow by spectral doppler.    4. Normal left atrial size. No thrombus or mass seen in the left atrium or left atrial appendage. Left atrial appendage peak emptying velocity 40cm/s.  5. Evidence of small PFO with left to right shunt with agitated saline.       Echo DEC 2021 AORTA 4.0  Ef70% can't exclude smal residual pfo  Aortic root four-point 1 aortic root index 2.2 cm/m² TRACE MR    Echo sept 2023   Left Ventricle: Normal ventricle size. Normal wall thickness. Preserved systolic function. Estimated EF 60%. No regional wall motion abnormalities. Normal diastolic filling pattern for age.    Aorta: Aortic root normal. Sinuses of Valsalva normal-sized. Ascending aorta normal-sized. Aortic arch normal-sized. Descending aorta normal-sized. Dilatation of the ascending aorta.  4.0 cm stable from 2019    Right Ventricle: Normal ventricle size. Normal systolic function.    Left Atrium: Normal sized atrium. Amplatzer closure device used.    Right Atrium: Normal sized atrium.    Pulmonic Valve: Normal structure. No regurgitation. No stenosis.    Tricuspid Valve: Normal structure. No regurgitation. No significant stenosis.    Mitral Valve: Normal leaflet structure. Normal leaflet motion. No regurgitation. No stenosis.    Aortic Valve: Tricuspid valve. Trace regurgitation. No stenosis.    IVC/SVC: Normal sized inferior vena cava. Inferior vena cava demonstrates normal respiratory collapse.    Pericardium: Normal structure. No evidence of pericardial effusion.    No significant change from study of 2019         MRI   mri head 10/18 r cerebellar old infarc  cta brain 11/18 left carotid terminus aneurysm 3 mm, chronic  lacunar infarct left cerebellum    cta head 11/18 3mm anudryeleft carotid terminus  Mild plaque L internal caroitd    mra 12/19 no change 3.2 ic aneurysm        CTA April 2021  Dilated ascending aorta measuring 4.0 cm.  Stable from 2021    Cor garett score oct 2023  CORONARY CALCIUM COMPOSITE AGATSTON SCORE: 0     Left Main: 0     LAD: 0     LCX: 0     RCA: 0        3) NON-CARDIAC FINDINGS     AORTA: Stable aneurysmal dilatation of ascending aorta measuring 4.3 cm in AP  diameter at the level of the right pulmonary artery unchanged compared to  4/5/2021.       ELECTROPHYSIOLOGY  12/18 monitor 10 beat svt    ECG normla  sinus rhythm normal sinus rhythm nonspecific anterior T wave inversions no ratliff        Sept 2023 nonspecific anterior T wave changes stable from 2022                                                  e       Assessment/Plan     H/O Amplatzer atrial septal defect closure  2019 cryptogenic stroke right posterior cerebellum ASD found Amplatzer repair echocardiogram September 2023 stable EF 60% no shunt aortic root stable 4.0 dilated 4.0 cm stable from 2019    Dilated aortic root (CMS/HCC)  Image with echo September 2023 4.0 cm stable from 2019 CAT scan 4.0 cm 2021 she has by allergy will reassess with coronary calcium score prior to next visit    Familial hyperlipidemia  Recently stopped the statins muscle aches she had a coronary calcium score of 0 in 2015 did show some plaque on thoracic aorta, she is intolerant of statins she has been assessed by [facility] in the past we will set up a follow-up appointment with Dr. Brito  The diagnosis of arteriosclerosis plaque seen on the aorta of the big fan of trying to lower her cholesterol we will see what her levels are to consider bempedoic acid Zetia with her next visit I will see her back with lab work and after coronary calcium score    PVD (peripheral vascular disease) (CMS/HCC)  Transesophageal echo performed 2018 plaque seen on aorta I  would like her LDL cholesterol be under 70 did not tolerate statins repeat lab work pending consider bempedoic acid and Zetia    Brain aneurysm  3 mm left internal carotid aneurysm evaluated May 2023) stable follows yearly    Pre-op evaluation  No cardiac contraindication to undergoing breast reduction surgery with Dr. Ilir Tidwell in the [facility] no other cardiac studies recommended preoperatively aspirin therapy can be interrupted if indicated no preop  antibiotics indicated from a cardiac standpoint                       She is a history of cryptogenic stroke in February 2019 and had a    PFO had Amplatzer repair to repair the defect  She has dilated aortic root imaged September 2023 stable from 2019  She has familial hyperlipidemia started on rosuvastatin but she did not tolerate it with muscle aches  I  Has intracerebral aneurysm being followed by neurology last imaged in May  Mildly dilated aortic root on echocardiogram follow-up with CTA 4.0 cm by recent CTA and echocardiogram stable findings September 2023    She has familial hyperlipidemia plaque seen on thoracic aorta by transesophageal echo back in 2018    She is going to have breast reduction surgery  No cardiac contraindication to having breast reduction surgery    no other cardiac testing required preoperatively no antibiotic prophylaxis indicated aspirin therapy can be interrupted  periop      I will see her back in a few months with repeat coronary calcium score to evaluate her dilated aortic root and coronary arteries repeat lab work  Will reintroduce cholesterol-lowering agent probably bempedoic acid and Zetia  At her next visit  We will also set up follow-up with lipid clinic with Dr. Rausch                  This letter was generated using speech recognition software.  Please excuse any typographical errors.                    Luis Dumont MD  9/28/2023

## 2023-09-28 ENCOUNTER — OFFICE VISIT (OUTPATIENT)
Dept: CARDIOLOGY | Facility: CLINIC | Age: 53
End: 2023-09-28
Payer: COMMERCIAL

## 2023-09-28 ENCOUNTER — TELEPHONE (OUTPATIENT)
Dept: CARDIOLOGY | Facility: CLINIC | Age: 53
End: 2023-09-28

## 2023-09-28 VITALS
BODY MASS INDEX: 31.41 KG/M2 | WEIGHT: 184 LBS | OXYGEN SATURATION: 98 % | SYSTOLIC BLOOD PRESSURE: 130 MMHG | HEIGHT: 64 IN | HEART RATE: 69 BPM | DIASTOLIC BLOOD PRESSURE: 80 MMHG

## 2023-09-28 DIAGNOSIS — Z01.818 PRE-OP EVALUATION: ICD-10-CM

## 2023-09-28 DIAGNOSIS — I77.810 DILATED AORTIC ROOT (CMS/HCC): Primary | ICD-10-CM

## 2023-09-28 DIAGNOSIS — I67.1 BRAIN ANEURYSM: ICD-10-CM

## 2023-09-28 DIAGNOSIS — I63.9 CEREBELLAR INFARCT (CMS/HCC): ICD-10-CM

## 2023-09-28 DIAGNOSIS — I73.9 PVD (PERIPHERAL VASCULAR DISEASE) (CMS/HCC): ICD-10-CM

## 2023-09-28 DIAGNOSIS — R73.9 HYPERGLYCEMIA: ICD-10-CM

## 2023-09-28 DIAGNOSIS — Z87.74 H/O AMPLATZER ATRIAL SEPTAL DEFECT CLOSURE: ICD-10-CM

## 2023-09-28 DIAGNOSIS — E78.49 FAMILIAL HYPERLIPIDEMIA: ICD-10-CM

## 2023-09-28 PROCEDURE — 99214 OFFICE O/P EST MOD 30 MIN: CPT | Performed by: INTERNAL MEDICINE

## 2023-09-28 PROCEDURE — 93000 ELECTROCARDIOGRAM COMPLETE: CPT | Performed by: INTERNAL MEDICINE

## 2023-09-28 PROCEDURE — 3008F BODY MASS INDEX DOCD: CPT | Performed by: INTERNAL MEDICINE

## 2023-09-28 RX ORDER — DIPHENHYDRAMINE HCL 50 MG
50 CAPSULE ORAL ONCE
Qty: 1 CAPSULE | Refills: 0 | Status: SHIPPED | OUTPATIENT
Start: 2023-09-28 | End: 2023-09-28

## 2023-09-28 NOTE — TELEPHONE ENCOUNTER
Clearance form, OV notes and EKG faxed to [employer] [employer] at [phone].  Received a confirmation fax that info delivered.

## 2023-09-28 NOTE — ASSESSMENT & PLAN NOTE
Transesophageal echo performed 2018 plaque seen on aorta I would like her LDL cholesterol be under 70 did not tolerate statins repeat lab work pending consider bempedoic acid and Zetia

## 2023-09-28 NOTE — ASSESSMENT & PLAN NOTE
2019 cryptogenic stroke right posterior cerebellum ASD found Amplatzer repair echocardiogram September 2023 stable EF 60% no shunt aortic root stable 4.0 dilated 4.0 cm stable from 2019

## 2023-09-28 NOTE — ASSESSMENT & PLAN NOTE
Recently stopped the statins muscle aches she had a coronary calcium score of 0 in 2015 did show some plaque on thoracic aorta, she is intolerant of statins she has been assessed by [facility] in the past we will set up a follow-up appointment with Dr. Brito  The diagnosis of arteriosclerosis plaque seen on the aorta of the big fan of trying to lower her cholesterol we will see what her levels are to consider bempedoic acid Zetia with her next visit I will see her back with lab work and after coronary calcium score

## 2023-09-28 NOTE — TELEPHONE ENCOUNTER
Pt is here for an appt and has a surgical clearance that needs to be filled out. Will give to john

## 2023-09-28 NOTE — ASSESSMENT & PLAN NOTE
No cardiac contraindication to undergoing breast reduction surgery with Dr. Ilir Tidwell in the [facility] no other cardiac studies recommended preoperatively aspirin therapy can be interrupted if indicated no preop  antibiotics indicated from a cardiac standpoint

## 2023-09-30 LAB
ALBUMIN SERPL-MCNC: 4.5 G/DL (ref 3.8–4.9)
ALBUMIN/GLOB SERPL: 1.7 {RATIO} (ref 1.2–2.2)
ALP SERPL-CCNC: 62 IU/L (ref 44–121)
ALT SERPL-CCNC: 16 IU/L (ref 0–32)
AST SERPL-CCNC: 21 IU/L (ref 0–40)
BASOPHILS # BLD AUTO: 0.1 X10E3/UL (ref 0–0.2)
BASOPHILS NFR BLD AUTO: 1 %
BILIRUB SERPL-MCNC: 0.3 MG/DL (ref 0–1.2)
BUN SERPL-MCNC: 13 MG/DL (ref 6–24)
BUN/CREAT SERPL: 17 (ref 9–23)
CALCIUM SERPL-MCNC: 10.2 MG/DL (ref 8.7–10.2)
CHLORIDE SERPL-SCNC: 101 MMOL/L (ref 96–106)
CHOLEST SERPL-MCNC: 333 MG/DL (ref 100–199)
CO2 SERPL-SCNC: 23 MMOL/L (ref 20–29)
CREAT SERPL-MCNC: 0.78 MG/DL (ref 0.57–1)
EGFRCR SERPLBLD CKD-EPI 2021: 92 ML/MIN/1.73
EOSINOPHIL # BLD AUTO: 0.2 X10E3/UL (ref 0–0.4)
EOSINOPHIL NFR BLD AUTO: 4 %
ERYTHROCYTE [DISTWIDTH] IN BLOOD BY AUTOMATED COUNT: 13 % (ref 11.7–15.4)
GLOBULIN SER CALC-MCNC: 2.6 G/DL (ref 1.5–4.5)
GLUCOSE SERPL-MCNC: 119 MG/DL (ref 70–99)
HCT VFR BLD AUTO: 38.1 % (ref 34–46.6)
HDLC SERPL-MCNC: 61 MG/DL
HGB BLD-MCNC: 12.6 G/DL (ref 11.1–15.9)
LABORATORY COMMENT REPORT: ABNORMAL
LDLC SERPL CALC-MCNC: 252 MG/DL (ref 0–99)
LYMPHOCYTES # BLD AUTO: 2.9 X10E3/UL (ref 0.7–3.1)
LYMPHOCYTES NFR BLD AUTO: 49 %
MCH RBC QN AUTO: 29.2 PG (ref 26.6–33)
MCHC RBC AUTO-ENTMCNC: 33.1 G/DL (ref 31.5–35.7)
MCV RBC AUTO: 88 FL (ref 79–97)
MONOCYTES # BLD AUTO: 0.2 X10E3/UL (ref 0.1–0.9)
MONOCYTES NFR BLD AUTO: 3 %
MORPHOLOGY BLD-IMP: ABNORMAL
NEUTROPHILS # BLD AUTO: 2.5 X10E3/UL (ref 1.4–7)
NEUTROPHILS NFR BLD AUTO: 43 %
PLATELET # BLD AUTO: 98 X10E3/UL (ref 150–450)
POTASSIUM SERPL-SCNC: 4.1 MMOL/L (ref 3.5–5.2)
PROT SERPL-MCNC: 7.1 G/DL (ref 6–8.5)
RBC # BLD AUTO: 4.32 X10E6/UL (ref 3.77–5.28)
SODIUM SERPL-SCNC: 139 MMOL/L (ref 134–144)
TRIGL SERPL-MCNC: 115 MG/DL (ref 0–149)
VLDLC SERPL CALC-MCNC: 20 MG/DL (ref 5–40)
WBC # BLD AUTO: 5.9 X10E3/UL (ref 3.4–10.8)

## 2023-10-01 ENCOUNTER — TELEPHONE (OUTPATIENT)
Dept: CARDIOLOGY | Facility: CLINIC | Age: 53
End: 2023-10-01
Payer: COMMERCIAL

## 2023-10-01 NOTE — TELEPHONE ENCOUNTER
Received a call from the [facility] regarding a critically low value of platelets at 98,000.  I relayed this message to the patient and explained that occasionally this can occur in the setting of platelet clumping, but but this should likely be investigated somewhat further.  I stated that she could follow-up with Dr. Dumont who may or may not feel she should be referred to a hematologist regarding this low platelet count.  Otherwise she states that she does not have bleeding but does feel that she bruises easily.  She takes aspirin but she is not holding it in anticipation of a possible surgery.  I stated this all sounded fine and that she could then discuss her elevated lipid panel as well with Dr. Dumont when she is back in the office.    Ag Braun MD  10/01/23

## 2023-10-02 ENCOUNTER — TELEPHONE (OUTPATIENT)
Dept: CARDIOLOGY | Facility: CLINIC | Age: 53
End: 2023-10-02
Payer: COMMERCIAL

## 2023-10-02 DIAGNOSIS — E78.49 FAMILIAL HYPERLIPIDEMIA: Primary | ICD-10-CM

## 2023-10-02 RX ORDER — BEMPEDOIC ACID AND EZETIMIBE 180; 10 MG/1; MG/1
180 TABLET, FILM COATED ORAL DAILY
Qty: 90 TABLET | Refills: 3 | Status: SHIPPED | OUTPATIENT
Start: 2023-10-02 | End: 2024-11-19 | Stop reason: SDUPTHER

## 2023-10-02 NOTE — TELEPHONE ENCOUNTER
Let her know her cholesterol is markedly elevated she needs pharmaceutical intervention  We can try the oral pill nexlozet first follow-up with lab work in 3 months  If this is not successful we will move onto PCSK9 inhibitor  He has could still proceed with repeat coronary calcium score

## 2023-10-02 NOTE — TELEPHONE ENCOUNTER
Called and spoke with Olry.  Dr. Dumont reviewed your lab results.  Platelets are 98.  You will need to check with surgeon to see if needs to be repeated.  Your cholesterol is still very high.  She did send prescription for Nexlizet to [facility].  You will need repeat blood work after being on medication for 3 months.  Will mail your lab scripts to your home address.  She understands.  No questions..

## 2023-10-09 ENCOUNTER — CONSULT (OUTPATIENT)
Dept: FAMILY MEDICINE | Facility: CLINIC | Age: 53
End: 2023-10-09
Payer: COMMERCIAL

## 2023-10-09 VITALS
WEIGHT: 180 LBS | HEIGHT: 64 IN | TEMPERATURE: 97 F | SYSTOLIC BLOOD PRESSURE: 126 MMHG | DIASTOLIC BLOOD PRESSURE: 80 MMHG | HEART RATE: 61 BPM | OXYGEN SATURATION: 98 % | BODY MASS INDEX: 30.73 KG/M2

## 2023-10-09 DIAGNOSIS — R73.01 IMPAIRED FASTING GLUCOSE: ICD-10-CM

## 2023-10-09 DIAGNOSIS — I47.10 SVT (SUPRAVENTRICULAR TACHYCARDIA) (CMS/HCC): ICD-10-CM

## 2023-10-09 DIAGNOSIS — D69.6 THROMBOCYTOPENIA (CMS/HCC): ICD-10-CM

## 2023-10-09 DIAGNOSIS — Z01.818 PRE-OP EXAMINATION: Primary | ICD-10-CM

## 2023-10-09 DIAGNOSIS — N62 MACROMASTIA: ICD-10-CM

## 2023-10-09 PROBLEM — N87.0 CERVICAL INTRAEPITHELIAL NEOPLASIA GRADE 1: Status: ACTIVE | Noted: 2023-10-09

## 2023-10-09 PROCEDURE — 3008F BODY MASS INDEX DOCD: CPT | Performed by: FAMILY MEDICINE

## 2023-10-09 PROCEDURE — 99214 OFFICE O/P EST MOD 30 MIN: CPT | Performed by: FAMILY MEDICINE

## 2023-10-09 RX ORDER — DIPHENHYDRAMINE HCL 50 MG/1
CAPSULE ORAL
COMMUNITY
Start: 2023-09-28 | End: 2024-01-24

## 2023-10-09 NOTE — PROGRESS NOTES
HPI:  Orly Lara is an 53 y.o. female presenting for pre-op clearance for breast reduction. Patient has paperwork that needs to be completed at home.  Cardiology cleared her for surgery.  No prior complications with anesthesia.    Long standing shoulder/upper back pain, bra strap marks, frequent rashes under breasts.      Allergies:  Augmentin [amoxicillin-pot clavulanate], Iodinated contrast media, Lipitor [atorvastatin], and Penicillin g benzathine    Past Medical History:   Diagnosis Date    Aneurysm (CMS/HCC)     Hypertension     Lipid disorder     Positional vertigo     Stroke (CMS/HCC)        Past Surgical History:   Procedure Laterality Date    CARDIAC SURGERY      HYSTEROSCOPY W/ ENDOMETRIAL ABLATION      PATENT FORAMEN OVALE CLOSURE      SKIN BIOPSY         Social History     Tobacco Use    Smoking status: Former     Types: Cigarettes     Quit date:      Years since quittin.7    Smokeless tobacco: Never   Substance Use Topics    Alcohol use: Yes     Comment: 10 drinks 2 days per week       Current Outpatient Medications on File Prior to Visit   Medication Sig Dispense Refill    ascorbic acid (VITAMIN C) 500 mg tablet Take 500 mg by mouth daily.      aspirin 81 mg enteric coated tablet Take 1 tablet (81 mg total) by mouth daily. 90 tablet 1    BANOPHEN 50 mg capsule TAKE 1 CAPSULE BY MOUTH ONCE FOR 1 DOSE. TAKE 1 HOUR BEFORE EXAM WITH LAST DOSE OF PREDNISONE      bempedoic acid-ezetimibe (NEXLIZET) 180-10 mg tablet Take 180 mg by mouth daily. 90 tablet 3    hydrochlorothiazide (HYDRODIURIL) 25 mg tablet Take 1 tablet (25 mg total) by mouth daily. 90 tablet 3    Lactobacillus acidophilus (PROBIOTIC ORAL) Take 1 capsule by mouth daily.      omega-3 fatty acids (FISH OIL CONCENTRATE ORAL) Take by mouth.      valACYclovir (VALTREX) 1 gram tablet TAKE TWO TABLETS (2,000 MG TOTAL) BY MOUTH TWO (2) TIMES A DAY FOR TWO DOSES. 30 tablet 0     No current facility-administered medications  "on file prior to visit.       Family History   Problem Relation Age of Onset    Heart attack Biological Mother     Hyperlipidemia Biological Mother     Stroke Biological Mother     Heart disease Biological Father        Visit Vitals  /80 (BP Location: Left upper arm, Patient Position: Sitting)   Pulse 61   Temp 36.1 °C (97 °F) (Temporal)   Ht 1.613 m (5' 3.5\")   Wt 81.6 kg (180 lb)   SpO2 98%   BMI 31.39 kg/m²        Physical Exam  Constitutional:       General: She is not in acute distress.     Appearance: Normal appearance. She is not ill-appearing.   HENT:      Right Ear: Tympanic membrane and ear canal normal.      Left Ear: Tympanic membrane and ear canal normal.      Nose: Nose normal.      Mouth/Throat:      Pharynx: Oropharynx is clear.   Eyes:      Extraocular Movements: Extraocular movements intact.      Pupils: Pupils are equal, round, and reactive to light.   Neck:      Thyroid: No thyromegaly.   Cardiovascular:      Rate and Rhythm: Normal rate and regular rhythm.      Pulses: Normal pulses.           Dorsalis pedis pulses are 2+ on the right side and 2+ on the left side.      Heart sounds: Normal heart sounds. No murmur heard.  Pulmonary:      Effort: Pulmonary effort is normal. No respiratory distress.      Breath sounds: Normal breath sounds. No wheezing, rhonchi or rales.   Abdominal:      General: Bowel sounds are normal.      Palpations: Abdomen is soft. There is no mass.      Tenderness: There is no abdominal tenderness. There is no guarding or rebound.   Musculoskeletal:         General: No swelling or tenderness. Normal range of motion.      Cervical back: Normal range of motion and neck supple.      Right lower leg: No edema.      Left lower leg: No edema.   Lymphadenopathy:      Cervical: No cervical adenopathy.   Skin:     Findings: No lesion or rash.   Neurological:      General: No focal deficit present.      Mental Status: She is alert.      Deep Tendon Reflexes: Reflexes " normal.   Psychiatric:         Mood and Affect: Mood normal.           A/P   1. Pre-op examination  Patient will upload forms needed for surgery.  EKG and labs recently completed by cardioloy    2. Macromastia      3. SVT (supraventricular tachycardia)  stable    4. Thrombocytopenia (CMS/HCC)  Recommend hematology consult before proceeding with surgery  - Ambulatory referral to Hematology; Future    5. Impaired fasting glucose    - Hemoglobin A1c; Future  - Hemoglobin A1c            Next appointment recommended:  TBD      The patient (parent) indicates an understanding of the events of today's medical evaluation and agrees to the plan of care.    I have asked the patient (parent) to be on the alert for new or worsening symptoms and to call the office directly or proceed to the nearest ER if such should occur.    Total time spent on day on encounter 30 minutes.

## 2023-10-11 LAB — HBA1C MFR BLD: 5.9 % (ref 4.8–5.6)

## 2023-10-13 ENCOUNTER — HOSPITAL ENCOUNTER (OUTPATIENT)
Dept: RADIOLOGY | Age: 53
Discharge: HOME | End: 2023-10-13
Attending: INTERNAL MEDICINE

## 2023-10-13 DIAGNOSIS — I77.810 DILATED AORTIC ROOT (CMS/HCC): ICD-10-CM

## 2023-10-13 DIAGNOSIS — R73.9 HYPERGLYCEMIA: ICD-10-CM

## 2023-10-13 DIAGNOSIS — E78.49 FAMILIAL HYPERLIPIDEMIA: ICD-10-CM

## 2023-10-13 DIAGNOSIS — Z87.74 H/O AMPLATZER ATRIAL SEPTAL DEFECT CLOSURE: ICD-10-CM

## 2023-10-13 DIAGNOSIS — I63.9 CEREBELLAR INFARCT (CMS/HCC): ICD-10-CM

## 2023-10-13 PROCEDURE — 75571 CT HRT W/O DYE W/CA TEST: CPT | Mod: MG

## 2023-11-01 ENCOUNTER — TELEPHONE (OUTPATIENT)
Dept: CARDIOLOGY | Facility: CLINIC | Age: 53
End: 2023-11-01
Payer: COMMERCIAL

## 2023-11-01 NOTE — TELEPHONE ENCOUNTER
I do not know what she means let her know her aorta has been the same size on all the echo was 4.0 cm stable finding

## 2023-11-01 NOTE — TELEPHONE ENCOUNTER
----- Message from Orly Lara sent at 10/31/2023  5:33 PM EDT -----  Regarding: Aneurysm and prescription  Contact: [phone]  Hello,     I'm looking over the last several ECHO's that I've had done and my last scan says that my ascending aorta is dilated however all the previous scans say the aortic root is dilated.  Can you please explain the discrepancy.      Also, Dr Dumont prescribed me Nexlizet for Cholesterol, my prescription company says it requires pre-authorization of some sort and paperwork was being sent back to your office to fill out in order to get it filled.  Can you verify that you sent the paperwork back.  I still don't have the prescription filled.    Thank you        I received no paper work from her insurance

## 2023-11-01 NOTE — TELEPHONE ENCOUNTER
I left a message on her machine that the echocardiogram readings are exactly the same aortic root same meaning

## 2023-11-03 ENCOUNTER — APPOINTMENT (RX ONLY)
Dept: URBAN - METROPOLITAN AREA CLINIC 26 | Facility: CLINIC | Age: 53
Setting detail: DERMATOLOGY
End: 2023-11-03

## 2023-11-03 DIAGNOSIS — D18.0 HEMANGIOMA: ICD-10-CM

## 2023-11-03 DIAGNOSIS — Z80.8 FAMILY HISTORY OF MALIGNANT NEOPLASM OF OTHER ORGANS OR SYSTEMS: ICD-10-CM

## 2023-11-03 DIAGNOSIS — L57.8 OTHER SKIN CHANGES DUE TO CHRONIC EXPOSURE TO NONIONIZING RADIATION: ICD-10-CM

## 2023-11-03 DIAGNOSIS — D22 MELANOCYTIC NEVI: ICD-10-CM

## 2023-11-03 DIAGNOSIS — L81.3 CAFÉ AU LAIT SPOTS: ICD-10-CM

## 2023-11-03 DIAGNOSIS — L82.1 OTHER SEBORRHEIC KERATOSIS: ICD-10-CM

## 2023-11-03 PROBLEM — D18.01 HEMANGIOMA OF SKIN AND SUBCUTANEOUS TISSUE: Status: ACTIVE | Noted: 2023-11-03

## 2023-11-03 PROBLEM — D22.72 MELANOCYTIC NEVI OF LEFT LOWER LIMB, INCLUDING HIP: Status: ACTIVE | Noted: 2023-11-03

## 2023-11-03 PROBLEM — D22.5 MELANOCYTIC NEVI OF TRUNK: Status: ACTIVE | Noted: 2023-11-03

## 2023-11-03 PROCEDURE — ? PATIENT SPECIFIC COUNSELING

## 2023-11-03 PROCEDURE — ? COUNSELING

## 2023-11-03 PROCEDURE — 99213 OFFICE O/P EST LOW 20 MIN: CPT

## 2023-11-03 PROCEDURE — ? FULL BODY SKIN EXAM

## 2023-11-03 PROCEDURE — ? ADDITIONAL NOTES

## 2023-11-03 PROCEDURE — ? SUNSCREEN RECOMMENDATIONS

## 2023-11-03 ASSESSMENT — LOCATION ZONE DERM
LOCATION ZONE: LEG
LOCATION ZONE: TRUNK

## 2023-11-03 ASSESSMENT — LOCATION DETAILED DESCRIPTION DERM
LOCATION DETAILED: LEFT INFERIOR LATERAL LOWER BACK
LOCATION DETAILED: INFERIOR THORACIC SPINE
LOCATION DETAILED: LEFT SUPERIOR MEDIAL LOWER BACK
LOCATION DETAILED: LEFT PROXIMAL POSTERIOR THIGH

## 2023-11-03 ASSESSMENT — LOCATION SIMPLE DESCRIPTION DERM
LOCATION SIMPLE: LEFT POSTERIOR THIGH
LOCATION SIMPLE: LEFT LOWER BACK
LOCATION SIMPLE: UPPER BACK

## 2023-11-08 ENCOUNTER — APPOINTMENT (OUTPATIENT)
Dept: LAB | Facility: HOSPITAL | Age: 53
End: 2023-11-08
Attending: INTERNAL MEDICINE
Payer: COMMERCIAL

## 2023-11-08 ENCOUNTER — TRANSCRIBE ORDERS (OUTPATIENT)
Dept: LAB | Facility: HOSPITAL | Age: 53
End: 2023-11-08

## 2023-11-08 DIAGNOSIS — Z12.11 ENCOUNTER FOR SCREENING FOR MALIGNANT NEOPLASM OF COLON: ICD-10-CM

## 2023-11-08 DIAGNOSIS — D69.6 THROMBOCYTOPENIA, UNSPECIFIED (CMS/HCC): ICD-10-CM

## 2023-11-08 DIAGNOSIS — Z12.11 ENCOUNTER FOR SCREENING FOR MALIGNANT NEOPLASM OF COLON: Primary | ICD-10-CM

## 2023-11-08 LAB
BASOPHILS # BLD: 0.04 K/UL (ref 0.01–0.1)
BASOPHILS NFR BLD: 0.6 %
DIFFERENTIAL METHOD BLD: NORMAL
EOSINOPHIL # BLD: 0.12 K/UL (ref 0.04–0.36)
EOSINOPHIL NFR BLD: 1.8 %
ERYTHROCYTE [DISTWIDTH] IN BLOOD BY AUTOMATED COUNT: 13.6 % (ref 11.7–14.4)
HCT VFR BLDCO AUTO: 39.2 % (ref 35–45)
HGB BLD-MCNC: 12.7 G/DL (ref 11.8–15.7)
IMM GRANULOCYTES # BLD AUTO: 0.02 K/UL (ref 0–0.08)
IMM GRANULOCYTES NFR BLD AUTO: 0.3 %
LYMPHOCYTES # BLD: 2.86 K/UL (ref 1.2–3.5)
LYMPHOCYTES NFR BLD: 43.2 %
MCH RBC QN AUTO: 29.3 PG (ref 28–33.2)
MCHC RBC AUTO-ENTMCNC: 32.4 G/DL (ref 32.2–35.5)
MCV RBC AUTO: 90.3 FL (ref 83–98)
MONOCYTES # BLD: 0.4 K/UL (ref 0.28–0.8)
MONOCYTES NFR BLD: 6 %
NEUTROPHILS # BLD: 3.18 K/UL (ref 1.7–7)
NEUTROPHILS # BLD: 3.18 K/UL (ref 1.7–7)
NEUTS SEG NFR BLD: 48.1 %
NRBC BLD-RTO: 0 %
PDW BLD AUTO: 10.6 FL (ref 9.4–12.3)
PLATELET # BLD AUTO: 216 K/UL (ref 150–369)
PLATELET # BLD AUTO: 219 K/UL (ref 150–369)
RBC # BLD AUTO: 4.34 M/UL (ref 3.93–5.22)
WBC # BLD AUTO: 6.62 K/UL (ref 3.8–10.5)

## 2023-11-08 PROCEDURE — 36415 COLL VENOUS BLD VENIPUNCTURE: CPT

## 2023-11-08 PROCEDURE — 85025 COMPLETE CBC W/AUTO DIFF WBC: CPT

## 2023-11-28 ENCOUNTER — OFFICE VISIT (OUTPATIENT)
Dept: FAMILY MEDICINE | Facility: CLINIC | Age: 53
End: 2023-11-28
Payer: COMMERCIAL

## 2023-11-28 VITALS
HEIGHT: 64 IN | SYSTOLIC BLOOD PRESSURE: 150 MMHG | HEART RATE: 67 BPM | DIASTOLIC BLOOD PRESSURE: 90 MMHG | TEMPERATURE: 97.3 F | BODY MASS INDEX: 31 KG/M2 | WEIGHT: 181.6 LBS | OXYGEN SATURATION: 98 %

## 2023-11-28 DIAGNOSIS — J01.00 ACUTE NON-RECURRENT MAXILLARY SINUSITIS: Primary | ICD-10-CM

## 2023-11-28 PROCEDURE — 99213 OFFICE O/P EST LOW 20 MIN: CPT | Performed by: FAMILY MEDICINE

## 2023-11-28 PROCEDURE — 3008F BODY MASS INDEX DOCD: CPT | Performed by: FAMILY MEDICINE

## 2023-11-28 RX ORDER — FLUCONAZOLE 150 MG/1
150 TABLET ORAL DAILY
Qty: 1 TABLET | Refills: 1 | Status: SHIPPED | OUTPATIENT
Start: 2023-11-28 | End: 2023-11-29

## 2023-11-28 RX ORDER — DOXYCYCLINE 100 MG/1
100 CAPSULE ORAL 2 TIMES DAILY
Qty: 14 CAPSULE | Refills: 0 | Status: SHIPPED | OUTPATIENT
Start: 2023-11-28 | End: 2023-12-05

## 2023-11-28 RX ORDER — METHYLPREDNISOLONE 4 MG/1
TABLET ORAL
Qty: 1 TABLET | Refills: 0 | Status: SHIPPED
Start: 2023-11-28 | End: 2024-01-24

## 2023-11-28 ASSESSMENT — ENCOUNTER SYMPTOMS
SHORTNESS OF BREATH: 0
FEVER: 0
RHINORRHEA: 1
CHILLS: 0
SINUS PRESSURE: 1
SINUS PAIN: 1
DIARRHEA: 0
SORE THROAT: 1
COUGH: 1
WHEEZING: 0
NAUSEA: 0

## 2023-11-28 NOTE — PATIENT INSTRUCTIONS
Hydrate more than normal - this will help loosen the mucous  Zinc and Vitamin C can help!  You can take the following OTC medications to help you feel better:  - Mucinex twice a day for congestion and clearing mucous. You can take Mucinex DM if you have a cough, but you it should be dosed every 12 hours.  The combination packages tend to be a lower dose and you need to take more frequently  - Antihistamines such as Claritin, Zyrtec, and Allegra for congestion/runny nose  - Flonase/Fluticasone for congestion, sinus pressure and post-nasal drip  - If you are going to use Afrin, take only for 2 days. Continued use can lead to worsening symptoms  - Aysha Pot or saline rinses can help get rid of mucous  - Tylenol/NSAIDs (Ibuprofen, Motrin, Alleve, Advil) as needed for aches, pains, sore throat. If you feel like you need to take them more often, you can alternate the two pills every 4 hours for better coverage. However, please understand that prolonged use of either can cause liver or kidney damage.  - I prefer Tylenol for fevers, but NSAIDs can help as well.  Again, you can alternate every 4 hours if needed.  You should begin to feel better in the next few days, but symptoms can last for over a week.  Coughs in particular can last a few weeks!  Please let me know if you develop any fevers, or if you feel that your symptoms are not improving after a week with these interventions.

## 2023-11-28 NOTE — PROGRESS NOTES
[facility]  [address] [place] [id number]  [phone]       Reason for visit:   Chief Complaint   Patient presents with    Illness      HPI   Orly Lara is a 53 y.o. female who presents with cold symptoms   - Cold Symptoms   X 3 weeks  Started with PND  Then congestion  Cough - mildly productive  Has been taking Mucinex, Flonase and Claritin  Does not feel any better over the last 4-7 days  Worse yesterday - fatigue  Sinus pressure, tooth pain     Past Medical History:   Diagnosis Date    Aneurysm (CMS/HCC)     Hypertension     Lipid disorder     Positional vertigo     Stroke (CMS/HCC)      Past Surgical History:   Procedure Laterality Date    CARDIAC SURGERY      HYSTEROSCOPY W/ ENDOMETRIAL ABLATION      PATENT FORAMEN OVALE CLOSURE      SKIN BIOPSY       Social History     Socioeconomic History    Marital status:      Spouse name: Not on file    Number of children: Not on file    Years of education: Not on file    Highest education level: Not on file   Occupational History    Not on file   Tobacco Use    Smoking status: Former     Types: Cigarettes     Quit date:      Years since quittin.9    Smokeless tobacco: Never   Vaping Use    Vaping Use: Never used   Substance and Sexual Activity    Alcohol use: Yes     Comment: 10 drinks 2 days per week    Drug use: Not Currently    Sexual activity: Yes     Partners: Male   Other Topics Concern    Not on file   Social History Narrative    Do you wear your seatbelt? Yes    Do you have smoke detector in your home? Yes    Do you have a carbon monoxide detector in your home? 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    Current Marital Status?      Social Determinants of Health     Financial Resource Strain: Not on file   Food Insecurity: Not on file   Transportation Needs: Not on file   Physical Activity: Not on file   Stress: Not on file   Social Connections: Not  on file   Intimate Partner Violence: Not on file   Housing Stability: Not on file     Family History   Problem Relation Age of Onset    Heart attack Biological Mother     Hyperlipidemia Biological Mother     Stroke Biological Mother     Heart disease Biological Father      Augmentin [amoxicillin-pot clavulanate], Iodinated contrast media, Lipitor [atorvastatin], Penicillin g benzathine, and Rosuvastatin  Current Outpatient Medications   Medication Sig Dispense Refill    ascorbic acid (VITAMIN C) 500 mg tablet Take 500 mg by mouth daily.      aspirin 81 mg enteric coated tablet Take 1 tablet (81 mg total) by mouth daily. 90 tablet 1    BANOPHEN 50 mg capsule TAKE 1 CAPSULE BY MOUTH ONCE FOR 1 DOSE. TAKE 1 HOUR BEFORE EXAM WITH LAST DOSE OF PREDNISONE      bempedoic acid-ezetimibe (NEXLIZET) 180-10 mg tablet Take 180 mg by mouth daily. 90 tablet 3    doxycycline (MONODOX) 100 mg capsule Take 1 capsule (100 mg total) by mouth 2 (two) times a day for 7 days. 14 capsule 0    fluconazole (DIFLUCAN) 150 mg tablet Take 1 tablet (150 mg total) by mouth daily for 1 day. 1 tablet 1    hydrochlorothiazide (HYDRODIURIL) 25 mg tablet Take 1 tablet (25 mg total) by mouth daily. 90 tablet 3    Lactobacillus acidophilus (PROBIOTIC ORAL) Take 1 capsule by mouth daily.      methylPREDNISolone (MEDROL DOSEPACK) 4 mg tablet Follow package directions. 1 tablet 0    omega-3 fatty acids (FISH OIL CONCENTRATE ORAL) Take by mouth.      valACYclovir (VALTREX) 1 gram tablet TAKE TWO TABLETS (2,000 MG TOTAL) BY MOUTH TWO (2) TIMES A DAY FOR TWO DOSES. 30 tablet 0     No current facility-administered medications for this visit.       Review of Systems   Constitutional: Negative for chills and fever.   HENT: Positive for congestion, postnasal drip, rhinorrhea, sinus pressure, sinus pain and sore throat.    Respiratory: Positive for cough. Negative for shortness of breath and wheezing.    Cardiovascular: Negative for chest pain.  "  Gastrointestinal: Negative for diarrhea and nausea.     Objective   Vitals:    11/28/23 1045   BP: (!) 150/90   BP Location: Left upper arm   Patient Position: Sitting   Pulse: 67   Temp: 36.3 °C (97.3 °F)   TempSrc: Temporal   SpO2: 98%   Weight: 82.4 kg (181 lb 9.6 oz)   Height: 1.613 m (5' 3.5\")       Physical Exam  Vitals reviewed.   Constitutional:       Appearance: She is well-developed.   HENT:      Right Ear: Tympanic membrane and ear canal normal.      Left Ear: Tympanic membrane and ear canal normal.      Nose: Mucosal edema and rhinorrhea present.      Right Sinus: Maxillary sinus tenderness present.      Left Sinus: Maxillary sinus tenderness present.      Mouth/Throat:      Pharynx: Uvula midline. No oropharyngeal exudate.      Tonsils: No tonsillar exudate. 1+ on the right. 1+ on the left.   Pulmonary:      Effort: Pulmonary effort is normal.      Breath sounds: Normal breath sounds. No decreased breath sounds, wheezing, rhonchi or rales.   Lymphadenopathy:      Cervical: No cervical adenopathy.         Procedures    Lab Results   Component Value Date    WBC 6.62 11/08/2023    HGB 12.7 11/08/2023    HCT 39.2 11/08/2023     11/08/2023     11/08/2023    CHOL 333 (H) 09/29/2023    TRIG 115 09/29/2023    HDL 61 09/29/2023    ALT 16 09/29/2023    AST 21 09/29/2023     09/29/2023    K 4.1 09/29/2023     09/29/2023    CREATININE 0.78 09/29/2023    BUN 13 09/29/2023    CO2 23 09/29/2023    TSH 1.920 11/13/2020    INR 0.9 02/07/2019    HGBA1C 5.9 (H) 10/10/2023         Assessment   Problem List Items Addressed This Visit    None  Visit Diagnoses     Acute non-recurrent maxillary sinusitis    -  Primary    New problem  x 3 wk  Congestion  Pressure, pain  Headache  Cough  PND  No relief from OTCs  Edema, max sinus TTP  + Doxy BID x 1 wk  Medrol  Continue w OTCs    Relevant Medications    doxycycline (MONODOX) 100 mg capsule    fluconazole (DIFLUCAN) 150 mg tablet    methylPREDNISolone " (MEDROL DOSEPACK) 4 mg tablet              Seth Montemayor MD  11/28/2023

## 2023-12-13 RX ORDER — VALACYCLOVIR HYDROCHLORIDE 1 G/1
TABLET, FILM COATED ORAL
Qty: 30 TABLET | Refills: 0 | Status: CANCELLED | OUTPATIENT
Start: 2023-12-13

## 2023-12-13 RX ORDER — VALACYCLOVIR HYDROCHLORIDE 1 G/1
TABLET, FILM COATED ORAL
Qty: 30 TABLET | Refills: 0 | Status: SHIPPED | OUTPATIENT
Start: 2023-12-13 | End: 2024-08-20 | Stop reason: SDUPTHER

## 2024-01-06 NOTE — ASSESSMENT & PLAN NOTE
Image with echo September 2023 4.0 cm stable from 2019 CAT scan 4.0 cm 2021 she has by allergy will reassess with coronary calcium score prior to next visit   Pt to ed with c/o mid to right sided back pain x 2 days. No meds PTA  Pt was seen at IC and given a muscle relaxer and diclofenac with little to no relief   Diagnosis muscle strain

## 2024-01-08 ENCOUNTER — TELEPHONE (OUTPATIENT)
Dept: CARDIOLOGY | Facility: CLINIC | Age: 54
End: 2024-01-08
Payer: COMMERCIAL

## 2024-01-08 NOTE — TELEPHONE ENCOUNTER
Clearance form, last OV notes and EKG faxed to [facility] at [phone].  Received a confirmation fax that info delivered.

## 2024-01-08 NOTE — TELEPHONE ENCOUNTER
----- Message from Orly Lara sent at 1/8/2024  7:25 AM EST -----  Regarding: surgery clearance  Contact: [phone]  Dr Dumont,    I am attaching the clearance letter I need for my breast reduction surgery.  I was postponed from October 2023 to February 22, 2024 due to my platelets being low, after meeting with the hematologist, he confirmed it was just a bad blood draw.  Can you please fill this out for me. We are working on getting insurance clearance again and they needed updated approvals.    Thanks, Orly Michelle cleared her in October.  Do you want me to resend your last OV notes?

## 2024-01-15 NOTE — PROGRESS NOTES
Mimi Kramer DO Paula Jane is a 54 y.o. female She returns for follow-up and echocardiogram    She is here for cardiac evaluation prior to breast reduction surgery with Dr. Hazel      She has a history of ASD repair with Amplatzer device 2023  She had a history in 2019 with cryptogenic stroke  MRI later we found atrial septal defect  She is also followed for dilated aortic root  Last imaged October 2023 4.3 cm stable from 2021  She has familial hyperlipidemia intolerant to statins  Controlled with bempedoic acid and Zetia  She has left internal carotid aneurysm follows with neurology yearly  No known obstructive CAD with coronary calcium score of 0 October 2023          Lab work January 2024  Cholesterol 196 down from 333 triglycerides 76  HDL 72   down from 252  Creatinine 0.96 potassium 3.9  CBC normal                                                                            Patient Active Problem List    Diagnosis Date Noted   • Pre-op evaluation 01/24/2024   • Cervical intraepithelial neoplasia grade 1 10/09/2023   • SVT (supraventricular tachycardia) 10/09/2023   • SVT (supraventricular tachycardia) 10/09/2023   • ASD (atrial septal defect) 02/13/2023   • H/O Amplatzer atrial septal defect closure 12/14/2020   • Family history of premature coronary artery disease 12/14/2020   • Statin intolerance 06/10/2020   • Hyperglycemia 08/02/2019   • Dilated aortic root (CMS/HCC) 08/02/2019   • PVD (peripheral vascular disease) (CMS/[facility]) 08/02/2019   • Cerebellar infarct (CMS/[facility]) 05/23/2019   • Brain aneurysm 05/23/2019   • PFO (patent foramen ovale) 11/30/2018   • Vestibular disequilibrium, left 08/05/2016   • Thrombocytopenia (CMS/HCC) 02/18/2016   • Familial hyperlipidemia 11/11/2014       Medical History:   Past Medical History:   Diagnosis Date   • Aneurysm (CMS/HCC)    • Hypertension    • Lipid disorder    • Positional vertigo    • Stroke (CMS/[facility])        Surgical History:   Past  Surgical History:   Procedure Laterality Date   • CARDIAC SURGERY     • HYSTEROSCOPY W/ ENDOMETRIAL ABLATION     • PATENT FORAMEN OVALE CLOSURE     • SKIN BIOPSY         Allergies: Augmentin [amoxicillin-pot clavulanate], Iodinated contrast media, Lipitor [atorvastatin], Penicillin g benzathine, and Rosuvastatin    Current Outpatient Medications   Medication Sig Dispense Refill   • ascorbic acid (VITAMIN C) 500 mg tablet Take 500 mg by mouth daily.     • aspirin 81 mg enteric coated tablet Take 1 tablet (81 mg total) by mouth daily. 90 tablet 1   • bempedoic acid-ezetimibe (NEXLIZET) 180-10 mg tablet Take 180 mg by mouth daily. 90 tablet 3   • diphenhydrAMINE (BENADRYL) 50 mg capsule Take 1 capsule (50 mg total) by mouth once for 1 dose. Take 1 tab 1 hr before exam (with last dose of prednisone). 1 capsule 0   • hydrochlorothiazide (HYDRODIURIL) 25 mg tablet Take 1 tablet (25 mg total) by mouth daily. 90 tablet 3   • Lactobacillus acidophilus (PROBIOTIC ORAL) Take 1 capsule by mouth daily.     • predniSONE (DELTASONE) 50 mg tablet Take 1 tablet (50 mg total) by mouth every 6 (six) hours for 3 doses. Take 1 tab 13 hrs, 1 tab 7 hrs, and 1 tab 1 hr before exam. 3 tablet 0   • valACYclovir (VALTREX) 1 gram tablet TAKE TWO TABLETS (2,000 MG TOTAL) BY MOUTH TWO (2) TIMES A DAY FOR TWO DOSES. 30 tablet 0     No current facility-administered medications for this visit.       Social History:   Social History     Socioeconomic History   • Marital status:      Spouse name: None   • Number of children: None   • Years of education: None   • Highest education level: None   Tobacco Use   • Smoking status: Former     Types: Cigarettes     Quit date:      Years since quittin.0   • Smokeless tobacco: Never   Vaping Use   • Vaping Use: Never used   Substance and Sexual Activity   • Alcohol use: Yes     Comment: 10 drinks 2 days per week   • Drug use: Not Currently   • Sexual activity: Yes     Partners: Male   Social  History Narrative    Do you wear your seatbelt? Yes    Do you have smoke detector in your home? Yes    Do you have a carbon monoxide detector in your home? 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    Current Marital Status?        Family History:   Family History   Problem Relation Age of Onset   • Heart attack Biological Mother    • Hyperlipidemia Biological Mother    • Stroke Biological Mother    • Heart disease Biological Father        Review of Systems   Review of Systems       Objective       Vitals:    01/24/24 1052   BP: 122/70   Pulse: 65   SpO2: 97%       Physical Exam  Constitutional:       General: She is not in acute distress.     Appearance: She is well-developed.   HENT:      Head: Normocephalic and atraumatic.      Nose: Nose normal.   Eyes:      General: No scleral icterus.     Conjunctiva/sclera: Conjunctivae normal.   Neck:      Vascular: No JVD.   Cardiovascular:      Rate and Rhythm: Normal rate and regular rhythm.      Pulses: Intact distal pulses.      Heart sounds: Normal heart sounds. No murmur heard.     No friction rub. No gallop.   Pulmonary:      Effort: Pulmonary effort is normal. No respiratory distress.      Breath sounds: No stridor. No wheezing or rales.   Chest:      Chest wall: No tenderness.   Abdominal:      Tenderness: There is no abdominal tenderness.   Musculoskeletal:         General: No deformity.   Skin:     General: Skin is warm and dry.   Neurological:      Mental Status: She is alert and oriented to person, place, and time.          Labs   Lab Results   Component Value Date    WBC 7.1 01/19/2024    HGB 12.2 01/19/2024    HCT 38.8 01/19/2024    PLT CANCELED 01/19/2024    CHOL 196 01/19/2024    TRIG 76 01/19/2024    HDL 72 01/19/2024    ALT 14 01/19/2024    AST 21 01/19/2024     01/19/2024    K 3.9 01/19/2024     01/19/2024    CREATININE 0.96 01/19/2024    BUN 15 01/19/2024    CO2 22 01/19/2024    TSH 1.920 11/13/2020    INR 0.9 02/07/2019     HGBA1C 5.9 (H) 10/10/2023   [id number]                      Imaging    ECHO  Gavin MILD PLAQUE AORTA 11/18 PFO    ECHO 12/20 TTE AMPLATZER DEVICE NO FLOW NL EF        CATH/cv surgery  PFO REPAIR 2/19 AMPLATZER DEVICE DR MARROQUIN    CAT SCAN  Cor garett score zero 2015 thymus tissue aorta normal    Coronary calcium score August 2019 zero    cta 4/2021 aorta 4.0 cm    \        ECHO  Gavin 11/18 EF 65%  PFO with left-to-right shunt plaque seen on aorta  agitated saline.       1. Normal left ventricular wall thickness and cavity with preserved systolic function. Estimated EF 65%. No regional wall motion abnormalities.   2. Three cusped aortic valve.  Mild aortic regurgitation.  Normal aortic dimensions. Simple atheroma in the visualized portions of the descending aorta without complex elements.   3. Normal mitral valve.  Trace mitral regurgitation.  Systolic predominance of pulmonary vein flow by spectral doppler.    4. Normal left atrial size. No thrombus or mass seen in the left atrium or left atrial appendage. Left atrial appendage peak emptying velocity 40cm/s.  5. Evidence of small PFO with left to right shunt with agitated saline.       Echo DEC 2021 AORTA 4.0  Ef70% can't exclude smal residual pfo  Aortic root four-point 1 aortic root index 2.2 cm/m² TRACE MR    Echo sept 2023   Left Ventricle: Normal ventricle size. Normal wall thickness. Preserved systolic function. Estimated EF 60%. No regional wall motion abnormalities. Normal diastolic filling pattern for age.  •  Aorta: Aortic root normal. Sinuses of Valsalva normal-sized. Ascending aorta normal-sized. Aortic arch normal-sized. Descending aorta normal-sized. Dilatation of the ascending aorta.  4.0 cm stable from 2019  •  Right Ventricle: Normal ventricle size. Normal systolic function.  •  Left Atrium: Normal sized atrium. Amplatzer closure device used.  •  Right Atrium: Normal sized atrium.  •  Pulmonic Valve: Normal structure. No regurgitation. No stenosis.  •   Tricuspid Valve: Normal structure. No regurgitation. No significant stenosis.  •  Mitral Valve: Normal leaflet structure. Normal leaflet motion. No regurgitation. No stenosis.  •  Aortic Valve: Tricuspid valve. Trace regurgitation. No stenosis.  •  IVC/SVC: Normal sized inferior vena cava. Inferior vena cava demonstrates normal respiratory collapse.  •  Pericardium: Normal structure. No evidence of pericardial effusion.  •  No significant change from study of 2019         MRI   mri head 10/18 r cerebellar old infarc  cta brain 11/18 left carotid terminus aneurysm 3 mm, chronic lacunar infarct left cerebellum    cta head 11/18 3mm anudryeleft carotid terminus  Mild plaque L internal caroitd    mra 12/19 no change 3.2 ic aneurysm        CTA April 2021  Dilated ascending aorta measuring 4.0 cm.  Stable from 2021    Cor garett score   oct 2023 0 aorta 4.3 cm  zero  CORONARY CALCIUM COMPOSITE AGATSTON SCORE: 0     Left Main: 0     LAD: 0     LCX: 0     RCA: 0        3) NON-CARDIAC FINDINGS     AORTA: Stable aneurysmal dilatation of ascending aorta measuring 4.3 cm in AP  diameter at the level of the right pulmonary artery unchanged compared to  4/5/2021.       ELECTROPHYSIOLOGY  12/18 monitor 10 beat svt    ECG normla  sinus rhythm normal sinus rhythm nonspecific anterior T wave inversions no ratliff      January 2024 poor R wave progression no change from 2022                                                                    e       Assessment/Plan     H/O Amplatzer atrial septal defect closure  I had met her in 2023 she had history of cryptogenic stroke, right posterior cerebellar in 2019 workup found atrial septal defect she underwent Amplatzer repair  She has no known obstructive CAD with coronary calcium score of 0 October 2023  She has thoracic aneurysm  Familial hyperlipidemia    Cerebellar infarct (CMS/HCC)  As above    Familial hyperlipidemia  Baseline LDL cholesterol 250 intolerant to statins on bempedoic acid and  Zetia LDL cholesterol 110    Brain aneurysm  3 mm intracerebral carotid aneurysm follows with [facility] follows with neurology yearly imaging    Dilated aortic root (CMS/HCC)  4.3 cm last imaged by CAT scan coronary calcium score October 2023, 4.3 cm stable from 2021  Echocardiogram September 2023 4.0 cm Amplatzer device stable normal LV systolic function  Follow-up 9 months to 1 year CTA of thoracic aorta and echo steroid prep she had a rash with dye exposure in the past    Pre-op evaluation  No cardiac contraindication to undergoing breast reduction surgery no other cardiac testing recommended preoperatively                       She is a history of cryptogenic stroke in February 2019 and had a    PFO had Amplatzer repair to repair the defect  She has dilated aortic root imaged September 2023 stable from 2019, 4.3 cm  She has familial hyperlipidemia started on rosuvastatin but she did not tolerate it with muscle aches cholesterol much improved on bempedoic acid and Zetia  No evidence of obstructive coronary artery disease with coronary calcium score of 0 October 2023  I  Has intracerebral aneurysm being followed by neurology last imaged in May  Mildly dilated aortic root on echocardiogram follow-up with CTA 4.0 cm by recent CTA and echocardiogram stable findings September 2023  She is going to have breast reduction surgery  No cardiac contraindication to having breast reduction surgery   No other cardiac testing required preoperatively  Follow-up in 9 months with repeat imaging of dilated thoracic aorta diet tract and CTA of chest with echocardiogram                     This letter was generated using speech recognition software.  Please excuse any typographical errors.                    Luis Dumont MD  1/24/2024

## 2024-01-22 LAB
BASOPHILS # BLD AUTO: 0.1 X10E3/UL (ref 0–0.2)
BASOPHILS NFR BLD AUTO: 1 %
EOSINOPHIL # BLD AUTO: 0.3 X10E3/UL (ref 0–0.4)
EOSINOPHIL NFR BLD AUTO: 5 %
ERYTHROCYTE [DISTWIDTH] IN BLOOD BY AUTOMATED COUNT: 13.8 % (ref 11.7–15.4)
HCT VFR BLD AUTO: 38.8 % (ref 34–46.6)
HGB BLD-MCNC: 12.2 G/DL (ref 11.1–15.9)
IMM GRANULOCYTES # BLD AUTO: 0 X10E3/UL (ref 0–0.1)
IMM GRANULOCYTES NFR BLD AUTO: 0 %
LYMPHOCYTES # BLD AUTO: 2.6 X10E3/UL (ref 0.7–3.1)
LYMPHOCYTES NFR BLD AUTO: 37 %
MCH RBC QN AUTO: 28.9 PG (ref 26.6–33)
MCHC RBC AUTO-ENTMCNC: 31.4 G/DL (ref 31.5–35.7)
MCV RBC AUTO: 92 FL (ref 79–97)
MONOCYTES # BLD AUTO: 0.6 X10E3/UL (ref 0.1–0.9)
MONOCYTES NFR BLD AUTO: 8 %
MORPHOLOGY BLD-IMP: ABNORMAL
NEUTROPHILS # BLD AUTO: 3.5 X10E3/UL (ref 1.4–7)
NEUTROPHILS NFR BLD AUTO: 49 %
PLATELET # BLD AUTO: ABNORMAL X10E3/UL
RBC # BLD AUTO: 4.22 X10E6/UL (ref 3.77–5.28)
WBC # BLD AUTO: 7.1 X10E3/UL (ref 3.4–10.8)

## 2024-01-23 LAB
ALBUMIN SERPL-MCNC: 5.1 G/DL (ref 3.8–4.9)
ALBUMIN/GLOB SERPL: 2.7 {RATIO} (ref 1.2–2.2)
ALP SERPL-CCNC: 61 IU/L (ref 44–121)
ALT SERPL-CCNC: 14 IU/L (ref 0–32)
AST SERPL-CCNC: 21 IU/L (ref 0–40)
BILIRUB SERPL-MCNC: 0.4 MG/DL (ref 0–1.2)
BUN SERPL-MCNC: 15 MG/DL (ref 6–24)
BUN/CREAT SERPL: 16 (ref 9–23)
CALCIUM SERPL-MCNC: 10.2 MG/DL (ref 8.7–10.2)
CHLORIDE SERPL-SCNC: 100 MMOL/L (ref 96–106)
CHOLEST SERPL-MCNC: 196 MG/DL (ref 100–199)
CO2 SERPL-SCNC: 22 MMOL/L (ref 20–29)
CREAT SERPL-MCNC: 0.96 MG/DL (ref 0.57–1)
EGFRCR SERPLBLD CKD-EPI 2021: 71 ML/MIN/1.73
GLOBULIN SER CALC-MCNC: 1.9 G/DL (ref 1.5–4.5)
GLUCOSE SERPL-MCNC: 95 MG/DL (ref 70–99)
HDLC SERPL-MCNC: 72 MG/DL
LDLC SERPL CALC-MCNC: 110 MG/DL (ref 0–99)
POTASSIUM SERPL-SCNC: 3.9 MMOL/L (ref 3.5–5.2)
PROT SERPL-MCNC: 7 G/DL (ref 6–8.5)
SODIUM SERPL-SCNC: 140 MMOL/L (ref 134–144)
TRIGL SERPL-MCNC: 76 MG/DL (ref 0–149)
VLDLC SERPL CALC-MCNC: 14 MG/DL (ref 5–40)

## 2024-01-24 ENCOUNTER — OFFICE VISIT (OUTPATIENT)
Dept: CARDIOLOGY | Facility: CLINIC | Age: 54
End: 2024-01-24
Payer: COMMERCIAL

## 2024-01-24 ENCOUNTER — TELEPHONE (OUTPATIENT)
Dept: CARDIOLOGY | Facility: CLINIC | Age: 54
End: 2024-01-24
Payer: COMMERCIAL

## 2024-01-24 VITALS
DIASTOLIC BLOOD PRESSURE: 70 MMHG | OXYGEN SATURATION: 97 % | BODY MASS INDEX: 31.24 KG/M2 | HEIGHT: 64 IN | HEART RATE: 65 BPM | WEIGHT: 183 LBS | SYSTOLIC BLOOD PRESSURE: 122 MMHG

## 2024-01-24 DIAGNOSIS — Z01.818 PRE-OP EVALUATION: ICD-10-CM

## 2024-01-24 DIAGNOSIS — Q21.10 ASD (ATRIAL SEPTAL DEFECT): ICD-10-CM

## 2024-01-24 DIAGNOSIS — I63.9 CEREBELLAR INFARCT (CMS/HCC): ICD-10-CM

## 2024-01-24 DIAGNOSIS — E78.49 FAMILIAL HYPERLIPIDEMIA: ICD-10-CM

## 2024-01-24 DIAGNOSIS — I67.1 BRAIN ANEURYSM: ICD-10-CM

## 2024-01-24 DIAGNOSIS — Z87.74 H/O AMPLATZER ATRIAL SEPTAL DEFECT CLOSURE: ICD-10-CM

## 2024-01-24 DIAGNOSIS — Q21.12 PFO (PATENT FORAMEN OVALE): ICD-10-CM

## 2024-01-24 DIAGNOSIS — I77.810 DILATED AORTIC ROOT (CMS/HCC): Primary | ICD-10-CM

## 2024-01-24 PROCEDURE — 93000 ELECTROCARDIOGRAM COMPLETE: CPT | Performed by: INTERNAL MEDICINE

## 2024-01-24 PROCEDURE — 99214 OFFICE O/P EST MOD 30 MIN: CPT | Performed by: INTERNAL MEDICINE

## 2024-01-24 PROCEDURE — 3008F BODY MASS INDEX DOCD: CPT | Performed by: INTERNAL MEDICINE

## 2024-01-24 RX ORDER — PREDNISONE 50 MG/1
50 TABLET ORAL EVERY 6 HOURS
Qty: 3 TABLET | Refills: 0 | Status: SHIPPED | OUTPATIENT
Start: 2024-01-24 | End: 2024-11-14 | Stop reason: ALTCHOICE

## 2024-01-24 RX ORDER — DIPHENHYDRAMINE HCL 50 MG
50 CAPSULE ORAL ONCE
Qty: 1 CAPSULE | Refills: 0 | Status: SHIPPED | OUTPATIENT
Start: 2024-01-24 | End: 2024-01-24

## 2024-01-24 NOTE — ASSESSMENT & PLAN NOTE
I had met her in 2023 she had history of cryptogenic stroke, right posterior cerebellar in 2019 workup found atrial septal defect she underwent Amplatzer repair  She has no known obstructive CAD with coronary calcium score of 0 October 2023  She has thoracic aneurysm  Familial hyperlipidemia

## 2024-01-24 NOTE — ASSESSMENT & PLAN NOTE
4.3 cm last imaged by CAT scan coronary calcium score October 2023, 4.3 cm stable from 2021  Echocardiogram September 2023 4.0 cm Amplatzer device stable normal LV systolic function  Follow-up 9 months to 1 year CTA of thoracic aorta and echo steroid prep she had a rash with dye exposure in the past

## 2024-01-24 NOTE — ASSESSMENT & PLAN NOTE
3 mm intracerebral carotid aneurysm follows with [facility] follows with neurology yearly imaging

## 2024-01-24 NOTE — TELEPHONE ENCOUNTER
Today's OV notes include clearance and EKG faxed to Dr. Hazel at [phone].  Received a confirmation fax that info delivered.

## 2024-01-24 NOTE — ASSESSMENT & PLAN NOTE
Baseline LDL cholesterol 250 intolerant to statins on bempedoic acid and Zetia LDL cholesterol 110

## 2024-01-24 NOTE — ASSESSMENT & PLAN NOTE
No cardiac contraindication to undergoing breast reduction surgery no other cardiac testing recommended preoperatively

## 2024-01-29 ENCOUNTER — CONSULT (OUTPATIENT)
Dept: FAMILY MEDICINE | Facility: CLINIC | Age: 54
End: 2024-01-29
Payer: COMMERCIAL

## 2024-01-29 ENCOUNTER — TELEPHONE (OUTPATIENT)
Dept: FAMILY MEDICINE | Facility: CLINIC | Age: 54
End: 2024-01-29

## 2024-01-29 VITALS
HEIGHT: 64 IN | DIASTOLIC BLOOD PRESSURE: 80 MMHG | BODY MASS INDEX: 31.1 KG/M2 | TEMPERATURE: 98.2 F | SYSTOLIC BLOOD PRESSURE: 124 MMHG | HEART RATE: 66 BPM | WEIGHT: 182.2 LBS | OXYGEN SATURATION: 98 %

## 2024-01-29 DIAGNOSIS — Z01.818 PRE-OP EVALUATION: Primary | ICD-10-CM

## 2024-01-29 DIAGNOSIS — R89.8 PSEUDOTHROMBOCYTOPENIA: ICD-10-CM

## 2024-01-29 DIAGNOSIS — N62 MACROMASTIA: ICD-10-CM

## 2024-01-29 PROBLEM — I47.10 SVT (SUPRAVENTRICULAR TACHYCARDIA) (CMS/HCC): Status: RESOLVED | Noted: 2023-10-09 | Resolved: 2024-01-29

## 2024-01-29 PROCEDURE — 3008F BODY MASS INDEX DOCD: CPT | Performed by: FAMILY MEDICINE

## 2024-01-29 PROCEDURE — 99214 OFFICE O/P EST MOD 30 MIN: CPT | Performed by: FAMILY MEDICINE

## 2024-01-29 NOTE — TELEPHONE ENCOUNTER
Please fax surgical clearance form along with progress note for visit 1/29/24. Information to fax is in my fax bin. Thank you.

## 2024-01-29 NOTE — PROGRESS NOTES
HPI:  Orly Lara is an 54 y.o. female presenting for pre-op clearance for breast reduction scheduled 24.     Long standing shoulder/upper back pain, bra strap marks, frequent rashes under breasts.    Received cardiac clearance from cardiology.        Allergies:  Augmentin [amoxicillin-pot clavulanate], Iodinated contrast media, Lipitor [atorvastatin], Penicillin g benzathine, and Rosuvastatin    Past Medical History:   Diagnosis Date   • Aneurysm (CMS/HCC)    • Hypertension    • Lipid disorder    • Positional vertigo    • Stroke (CMS/HCC)        Past Surgical History:   Procedure Laterality Date   • HYSTEROSCOPY W/ ENDOMETRIAL ABLATION     • PATENT FORAMEN OVALE CLOSURE     • SKIN BIOPSY         Social History     Tobacco Use   • Smoking status: Former     Packs/day: 0.50     Years: 18.00     Additional pack years: 0.00     Total pack years: 9.00     Types: Cigarettes     Quit date:      Years since quittin.0   • Smokeless tobacco: Never   Substance Use Topics   • Alcohol use: Yes     Alcohol/week: 5.0 standard drinks of alcohol     Types: 5 Standard drinks or equivalent per week     Comment: weekends       Current Outpatient Medications on File Prior to Visit   Medication Sig Dispense Refill   • ascorbic acid (VITAMIN C) 500 mg tablet Take 500 mg by mouth daily.     • aspirin 81 mg enteric coated tablet Take 1 tablet (81 mg total) by mouth daily. 90 tablet 1   • bempedoic acid-ezetimibe (NEXLIZET) 180-10 mg tablet Take 180 mg by mouth daily. 90 tablet 3   • hydrochlorothiazide (HYDRODIURIL) 25 mg tablet Take 1 tablet (25 mg total) by mouth daily. 90 tablet 3   • Lactobacillus acidophilus (PROBIOTIC ORAL) Take 1 capsule by mouth daily.     • valACYclovir (VALTREX) 1 gram tablet TAKE TWO TABLETS (2,000 MG TOTAL) BY MOUTH TWO (2) TIMES A DAY FOR TWO DOSES. 30 tablet 0   • predniSONE (DELTASONE) 50 mg tablet Take 1 tablet (50 mg total) by mouth every 6 (six) hours for 3 doses. Take 1 tab 13 hrs, 1 tab 7  "hrs, and 1 tab 1 hr before exam. 3 tablet 0     No current facility-administered medications on file prior to visit.       Family History   Problem Relation Age of Onset   • Heart attack Biological Mother    • Hyperlipidemia Biological Mother    • Stroke Biological Mother    • Heart disease Biological Father        Visit Vitals  /80   Pulse 66   Temp 36.8 °C (98.2 °F) (Oral)   Ht 1.626 m (5' 4\")   Wt 82.6 kg (182 lb 3.2 oz)   SpO2 98%   BMI 31.27 kg/m²        Physical Exam  Constitutional:       General: She is not in acute distress.     Appearance: Normal appearance. She is not ill-appearing.   HENT:      Right Ear: Tympanic membrane and ear canal normal.      Left Ear: Tympanic membrane and ear canal normal.      Nose: Nose normal.      Mouth/Throat:      Pharynx: Oropharynx is clear.   Eyes:      Extraocular Movements: Extraocular movements intact.      Pupils: Pupils are equal, round, and reactive to light.   Neck:      Thyroid: No thyromegaly.   Cardiovascular:      Rate and Rhythm: Normal rate and regular rhythm.      Pulses: Normal pulses.           Dorsalis pedis pulses are 2+ on the right side and 2+ on the left side.      Heart sounds: Normal heart sounds. No murmur heard.  Pulmonary:      Effort: Pulmonary effort is normal. No respiratory distress.      Breath sounds: Normal breath sounds. No wheezing, rhonchi or rales.   Abdominal:      General: Bowel sounds are normal.      Palpations: Abdomen is soft. There is no mass.      Tenderness: There is no abdominal tenderness. There is no guarding or rebound.   Musculoskeletal:         General: No swelling or tenderness. Normal range of motion.      Cervical back: Normal range of motion and neck supple.      Right lower leg: No edema.      Left lower leg: No edema.   Lymphadenopathy:      Cervical: No cervical adenopathy.   Skin:     Findings: No lesion or rash.   Neurological:      General: No focal deficit present.      Mental Status: She is alert.     "  Deep Tendon Reflexes: Reflexes normal.   Psychiatric:         Mood and Affect: Mood normal.           A/P  1. Pre-op evaluation  Medically stable to proceed with planned breast reduction.    2. Macromastia      3. Pseudothrombocytopenia  Has been evaluated by hematology.  May require repeat CBC before surgery as recent labs will fall out of 30 day window.          Next appointment recommended:  TBD      The patient (parent) indicates an understanding of the events of today's medical evaluation and agrees to the plan of care.    I have asked the patient (parent) to be on the alert for new or worsening symptoms and to call the office directly or proceed to the nearest ER if such should occur.    Total time spent on day on encounter 30 minutes.

## 2024-02-16 RX ORDER — LACTOBACILLUS ACIDOPHILUS 500MM CELL
1 CAPSULE ORAL DAILY
COMMUNITY

## 2024-02-16 RX ORDER — VALACYCLOVIR HYDROCHLORIDE 500 MG/1
1 TABLET, FILM COATED ORAL DAILY PRN
COMMUNITY
Start: 2014-02-20

## 2024-02-16 RX ORDER — BEMPEDOIC ACID AND EZETIMIBE 180; 10 MG/1; MG/1
180 TABLET, FILM COATED ORAL DAILY
COMMUNITY
Start: 2023-10-02

## 2024-02-16 RX ORDER — HYDROCHLOROTHIAZIDE 25 MG/1
25 TABLET ORAL DAILY
COMMUNITY
Start: 2023-10-05

## 2024-02-16 RX ORDER — FLUCONAZOLE 150 MG/1
TABLET ORAL AS NEEDED
COMMUNITY
Start: 2023-11-28

## 2024-02-16 NOTE — PRE-PROCEDURE INSTRUCTIONS
Pre-Surgery Instructions:   Medication Instructions    Acidophilus Lactobacillus CAPS Stop taking 5 days prior to surgery.    Ascorbic Acid SHRUTHI Stop taking 5 days prior to surgery.    Bempedoic Acid-Ezetimibe (Nexlizet) 180-10 MG TABS Take day of surgery.    Dextromethorphan-guaiFENesin 5-100 MG PACK Uses PRN- OK to take day of surgery    fluconazole (DIFLUCAN) 150 mg tablet Uses PRN- OK to take day of surgery    hydroCHLOROthiazide 25 mg tablet Hold day of surgery.    valACYclovir (VALTREX) 500 mg tablet Uses PRN- OK to take day of surgery   Medication instructions for day surgery reviewed. Please use only a sip of water to take your instructed medications. Avoid all over the counter vitamins, supplements and NSAIDS for one week prior to surgery per anesthesia guidelines. Tylenol is ok to take as needed.     You will receive a call one business day prior to surgery with an arrival time and hospital directions. If your surgery is scheduled on a Monday, the hospital will be calling you on the Friday prior to your surgery. If you have not heard from anyone by 8pm, please call the hospital supervisor through the hospital  at [phone]. ([place] [phone] or [place] [phone]).    Do not eat or drink anything after midnight the night before your surgery, including candy, mints, lifesavers, or chewing gum. Do not drink alcohol 24hrs before your surgery. Try not to smoke at least 24hrs before your surgery.       Follow the pre surgery showering instructions as listed in the “My Surgical Experience Booklet” or otherwise provided by your surgeon's office. Do not use a blade to shave the surgical area 1 week before surgery. It is okay to use a clean electric clippers up to 24 hours before surgery. Do not apply any lotions, creams, including makeup, cologne, deodorant, or perfumes after showering on the day of your surgery. Do not use dry shampoo, hair spray, hair gel, or any type of hair products.     No  contact lenses, eye make-up, or artificial eyelashes. Remove nail polish, including gel polish, and any artificial, gel, or acrylic nails if possible. Remove all jewelry including rings and body piercing jewelry.     Wear causal clothing that is easy to take on and off. Consider your type of surgery.    Keep any valuables, jewelry, piercings at home. Please bring any specially ordered equipment (sling, braces) if indicated.    Arrange for a responsible person to drive you to and from the hospital on the day of your surgery. Visitor Guidelines discussed.     Call the surgeon's office with any new illnesses, exposures, or additional questions prior to surgery.    Please reference your “My Surgical Experience Booklet” for additional information to prepare for your upcoming surgery.

## 2024-02-28 ENCOUNTER — ANESTHESIA EVENT (OUTPATIENT)
Dept: PERIOP | Facility: HOSPITAL | Age: 54
End: 2024-02-28
Payer: SELF-PAY

## 2024-03-01 ENCOUNTER — HOSPITAL ENCOUNTER (OUTPATIENT)
Facility: HOSPITAL | Age: 54
Setting detail: OUTPATIENT SURGERY
Discharge: HOME | End: 2024-03-01
Attending: PLASTIC SURGERY | Admitting: PLASTIC SURGERY
Payer: SELF-PAY

## 2024-03-01 ENCOUNTER — ANESTHESIA (OUTPATIENT)
Dept: PERIOP | Facility: HOSPITAL | Age: 54
End: 2024-03-01
Payer: SELF-PAY

## 2024-03-01 VITALS
SYSTOLIC BLOOD PRESSURE: 127 MMHG | TEMPERATURE: 97.6 F | DIASTOLIC BLOOD PRESSURE: 87 MMHG | BODY MASS INDEX: 29.88 KG/M2 | WEIGHT: 175 LBS | OXYGEN SATURATION: 95 % | HEIGHT: 64 IN | RESPIRATION RATE: 18 BRPM | HEART RATE: 70 BPM

## 2024-03-01 DIAGNOSIS — M54.89 OTHER BACK PAIN, UNSPECIFIED CHRONICITY: ICD-10-CM

## 2024-03-01 DIAGNOSIS — N62 HYPERTROPHY OF BREAST: ICD-10-CM

## 2024-03-01 DIAGNOSIS — E88.1 LIPODYSTROPHY: ICD-10-CM

## 2024-03-01 PROBLEM — I10 HTN (HYPERTENSION): Status: ACTIVE | Noted: 2024-03-01

## 2024-03-01 PROBLEM — I63.9 CVA (CEREBRAL VASCULAR ACCIDENT) (HCC): Status: ACTIVE | Noted: 2024-03-01

## 2024-03-01 PROCEDURE — 88305 TISSUE EXAM BY PATHOLOGIST: CPT | Performed by: PATHOLOGY

## 2024-03-01 RX ORDER — FENTANYL CITRATE 50 UG/ML
INJECTION, SOLUTION INTRAMUSCULAR; INTRAVENOUS AS NEEDED
Status: DISCONTINUED | OUTPATIENT
Start: 2024-03-01 | End: 2024-03-01

## 2024-03-01 RX ORDER — OXYCODONE HYDROCHLORIDE 5 MG/1
5 TABLET ORAL EVERY 4 HOURS PRN
Status: DISCONTINUED | OUTPATIENT
Start: 2024-03-01 | End: 2024-03-01 | Stop reason: HOSPADM

## 2024-03-01 RX ORDER — LIDOCAINE HYDROCHLORIDE 10 MG/ML
INJECTION, SOLUTION EPIDURAL; INFILTRATION; INTRACAUDAL; PERINEURAL AS NEEDED
Status: DISCONTINUED | OUTPATIENT
Start: 2024-03-01 | End: 2024-03-01

## 2024-03-01 RX ORDER — VANCOMYCIN HYDROCHLORIDE 1 G/200ML
1000 INJECTION, SOLUTION INTRAVENOUS ONCE
Status: COMPLETED | OUTPATIENT
Start: 2024-03-01 | End: 2024-03-01

## 2024-03-01 RX ORDER — MINERAL OIL
OIL (ML) MISCELLANEOUS AS NEEDED
Status: DISCONTINUED | OUTPATIENT
Start: 2024-03-01 | End: 2024-03-01 | Stop reason: HOSPADM

## 2024-03-01 RX ORDER — ONDANSETRON 2 MG/ML
4 INJECTION INTRAMUSCULAR; INTRAVENOUS EVERY 8 HOURS PRN
Status: DISCONTINUED | OUTPATIENT
Start: 2024-03-01 | End: 2024-03-01 | Stop reason: HOSPADM

## 2024-03-01 RX ORDER — DEXAMETHASONE SODIUM PHOSPHATE 10 MG/ML
INJECTION, SOLUTION INTRAMUSCULAR; INTRAVENOUS AS NEEDED
Status: DISCONTINUED | OUTPATIENT
Start: 2024-03-01 | End: 2024-03-01

## 2024-03-01 RX ORDER — FENTANYL CITRATE/PF 50 MCG/ML
50 SYRINGE (ML) INJECTION
Status: DISCONTINUED | OUTPATIENT
Start: 2024-03-01 | End: 2024-03-01 | Stop reason: HOSPADM

## 2024-03-01 RX ORDER — ACETAMINOPHEN 325 MG/1
650 TABLET ORAL EVERY 6 HOURS PRN
Status: DISCONTINUED | OUTPATIENT
Start: 2024-03-01 | End: 2024-03-01 | Stop reason: HOSPADM

## 2024-03-01 RX ORDER — ONDANSETRON 2 MG/ML
4 INJECTION INTRAMUSCULAR; INTRAVENOUS ONCE AS NEEDED
Status: DISCONTINUED | OUTPATIENT
Start: 2024-03-01 | End: 2024-03-01 | Stop reason: HOSPADM

## 2024-03-01 RX ORDER — PROPOFOL 10 MG/ML
INJECTION, EMULSION INTRAVENOUS AS NEEDED
Status: DISCONTINUED | OUTPATIENT
Start: 2024-03-01 | End: 2024-03-01

## 2024-03-01 RX ORDER — MAGNESIUM HYDROXIDE 1200 MG/15ML
LIQUID ORAL AS NEEDED
Status: DISCONTINUED | OUTPATIENT
Start: 2024-03-01 | End: 2024-03-01 | Stop reason: HOSPADM

## 2024-03-01 RX ORDER — DIPHENHYDRAMINE HYDROCHLORIDE 50 MG/ML
12.5 INJECTION INTRAMUSCULAR; INTRAVENOUS ONCE AS NEEDED
Status: DISCONTINUED | OUTPATIENT
Start: 2024-03-01 | End: 2024-03-01 | Stop reason: HOSPADM

## 2024-03-01 RX ORDER — OXYCODONE HYDROCHLORIDE 10 MG/1
10 TABLET ORAL EVERY 4 HOURS PRN
Status: DISCONTINUED | OUTPATIENT
Start: 2024-03-01 | End: 2024-03-01 | Stop reason: HOSPADM

## 2024-03-01 RX ORDER — ROCURONIUM BROMIDE 10 MG/ML
INJECTION, SOLUTION INTRAVENOUS AS NEEDED
Status: DISCONTINUED | OUTPATIENT
Start: 2024-03-01 | End: 2024-03-01

## 2024-03-01 RX ORDER — GABAPENTIN 300 MG/1
300 CAPSULE ORAL ONCE
Status: COMPLETED | OUTPATIENT
Start: 2024-03-01 | End: 2024-03-01

## 2024-03-01 RX ORDER — SODIUM CHLORIDE, SODIUM LACTATE, POTASSIUM CHLORIDE, CALCIUM CHLORIDE 600; 310; 30; 20 MG/100ML; MG/100ML; MG/100ML; MG/100ML
125 INJECTION, SOLUTION INTRAVENOUS CONTINUOUS
Status: DISCONTINUED | OUTPATIENT
Start: 2024-03-01 | End: 2024-03-01 | Stop reason: HOSPADM

## 2024-03-01 RX ORDER — ACETAMINOPHEN 325 MG/1
650 TABLET ORAL ONCE
Status: COMPLETED | OUTPATIENT
Start: 2024-03-01 | End: 2024-03-01

## 2024-03-01 RX ORDER — MIDAZOLAM HYDROCHLORIDE 2 MG/2ML
INJECTION, SOLUTION INTRAMUSCULAR; INTRAVENOUS AS NEEDED
Status: DISCONTINUED | OUTPATIENT
Start: 2024-03-01 | End: 2024-03-01

## 2024-03-01 RX ORDER — HYDROMORPHONE HCL/PF 1 MG/ML
0.25 SYRINGE (ML) INJECTION
Status: DISCONTINUED | OUTPATIENT
Start: 2024-03-01 | End: 2024-03-01 | Stop reason: HOSPADM

## 2024-03-01 RX ORDER — HYDROMORPHONE HCL/PF 1 MG/ML
SYRINGE (ML) INJECTION AS NEEDED
Status: DISCONTINUED | OUTPATIENT
Start: 2024-03-01 | End: 2024-03-01

## 2024-03-01 RX ORDER — BUPIVACAINE HYDROCHLORIDE 5 MG/ML
INJECTION, SOLUTION EPIDURAL; INTRACAUDAL AS NEEDED
Status: DISCONTINUED | OUTPATIENT
Start: 2024-03-01 | End: 2024-03-01 | Stop reason: HOSPADM

## 2024-03-01 RX ORDER — PROPOFOL 10 MG/ML
INJECTION, EMULSION INTRAVENOUS CONTINUOUS PRN
Status: DISCONTINUED | OUTPATIENT
Start: 2024-03-01 | End: 2024-03-01

## 2024-03-01 RX ORDER — ONDANSETRON 4 MG/1
4 TABLET, ORALLY DISINTEGRATING ORAL ONCE
Status: COMPLETED | OUTPATIENT
Start: 2024-03-01 | End: 2024-03-01

## 2024-03-01 RX ADMIN — ONDANSETRON 4 MG: 4 TABLET, ORALLY DISINTEGRATING ORAL at 08:37

## 2024-03-01 RX ADMIN — LIDOCAINE HYDROCHLORIDE 50 MG: 10 INJECTION, SOLUTION EPIDURAL; INFILTRATION; INTRACAUDAL; PERINEURAL at 10:32

## 2024-03-01 RX ADMIN — DEXAMETHASONE SODIUM PHOSPHATE 10 MG: 10 INJECTION, SOLUTION INTRAMUSCULAR; INTRAVENOUS at 10:32

## 2024-03-01 RX ADMIN — GABAPENTIN 300 MG: 300 CAPSULE ORAL at 08:34

## 2024-03-01 RX ADMIN — FENTANYL CITRATE 50 MCG: 50 INJECTION, SOLUTION INTRAMUSCULAR; INTRAVENOUS at 12:10

## 2024-03-01 RX ADMIN — VANCOMYCIN HYDROCHLORIDE 1000 MG: 1 INJECTION, SOLUTION INTRAVENOUS at 10:23

## 2024-03-01 RX ADMIN — FENTANYL CITRATE 50 MCG: 50 INJECTION, SOLUTION INTRAMUSCULAR; INTRAVENOUS at 10:32

## 2024-03-01 RX ADMIN — SUGAMMADEX 160 MG: 100 INJECTION, SOLUTION INTRAVENOUS at 14:01

## 2024-03-01 RX ADMIN — ROCURONIUM BROMIDE 10 MG: 10 INJECTION, SOLUTION INTRAVENOUS at 12:15

## 2024-03-01 RX ADMIN — FENTANYL CITRATE 50 MCG: 50 INJECTION, SOLUTION INTRAMUSCULAR; INTRAVENOUS at 10:52

## 2024-03-01 RX ADMIN — SODIUM CHLORIDE, SODIUM LACTATE, POTASSIUM CHLORIDE, AND CALCIUM CHLORIDE: .6; .31; .03; .02 INJECTION, SOLUTION INTRAVENOUS at 13:28

## 2024-03-01 RX ADMIN — FENTANYL CITRATE 50 MCG: 50 INJECTION, SOLUTION INTRAMUSCULAR; INTRAVENOUS at 11:27

## 2024-03-01 RX ADMIN — HYDROMORPHONE HYDROCHLORIDE 0.5 MG: 1 INJECTION, SOLUTION INTRAMUSCULAR; INTRAVENOUS; SUBCUTANEOUS at 12:29

## 2024-03-01 RX ADMIN — FENTANYL CITRATE 50 MCG: 50 INJECTION, SOLUTION INTRAMUSCULAR; INTRAVENOUS at 15:04

## 2024-03-01 RX ADMIN — SODIUM CHLORIDE, SODIUM LACTATE, POTASSIUM CHLORIDE, AND CALCIUM CHLORIDE: .6; .31; .03; .02 INJECTION, SOLUTION INTRAVENOUS at 12:29

## 2024-03-01 RX ADMIN — PROPOFOL 50 MCG/KG/MIN: 10 INJECTION, EMULSION INTRAVENOUS at 10:42

## 2024-03-01 RX ADMIN — HYDROMORPHONE HYDROCHLORIDE 0.5 MG: 1 INJECTION, SOLUTION INTRAMUSCULAR; INTRAVENOUS; SUBCUTANEOUS at 11:09

## 2024-03-01 RX ADMIN — SODIUM CHLORIDE, SODIUM LACTATE, POTASSIUM CHLORIDE, AND CALCIUM CHLORIDE: .6; .31; .03; .02 INJECTION, SOLUTION INTRAVENOUS at 11:42

## 2024-03-01 RX ADMIN — ROCURONIUM BROMIDE 50 MG: 10 INJECTION, SOLUTION INTRAVENOUS at 10:32

## 2024-03-01 RX ADMIN — MIDAZOLAM HYDROCHLORIDE 2 MG: 1 INJECTION, SOLUTION INTRAMUSCULAR; INTRAVENOUS at 10:25

## 2024-03-01 RX ADMIN — SODIUM CHLORIDE, SODIUM LACTATE, POTASSIUM CHLORIDE, AND CALCIUM CHLORIDE: .6; .31; .03; .02 INJECTION, SOLUTION INTRAVENOUS at 10:23

## 2024-03-01 RX ADMIN — ROCURONIUM BROMIDE 10 MG: 10 INJECTION, SOLUTION INTRAVENOUS at 12:45

## 2024-03-01 RX ADMIN — HYDROMORPHONE HYDROCHLORIDE 0.5 MG: 1 INJECTION, SOLUTION INTRAMUSCULAR; INTRAVENOUS; SUBCUTANEOUS at 11:01

## 2024-03-01 RX ADMIN — PROPOFOL 180 MG: 10 INJECTION, EMULSION INTRAVENOUS at 10:32

## 2024-03-01 RX ADMIN — ACETAMINOPHEN 650 MG: 325 TABLET ORAL at 08:34

## 2024-03-01 RX ADMIN — OXYCODONE HYDROCHLORIDE 5 MG: 5 TABLET ORAL at 16:22

## 2024-03-01 NOTE — DISCHARGE SUMMARY
Discharge Summary - Medical Manju Guido 54 y.o. female MRN: [id number]    [facility] Room / Bed: OR POOL/OR POOL Encounter: [id number]    BRIEF OVERVIEW  Admitting Provider: Merlin Hernandez MD  Discharge Provider: Merlin Hernandez MD  Primary Care Physician at Discharge: No primary care provider on file. None    Discharge To:  Home      Admission Date: 3/1/2024     Discharge Date: No discharge date for patient encounter.    Code Status: No Order  Advance Directive and Living Will: <no information>  Power of :        Primary Discharge Diagnosis  Active Problems:    HTN (hypertension)    CVA (cerebral vascular accident) (HCC)  Resolved Problems:    * No resolved hospital problems. *        Discharge Disposition: Final discharge disposition not confirmed            DETAILS OF HOSPITAL STAY    Presenting Problem/History of Present Illness  <principal problem not specified>      Discharge Condition: stable    Patient tolerated the procedure well, recovered and was discharged home in stable condition    Merlin Hernandez MD  3/1/2024  10:16 AM

## 2024-03-01 NOTE — ANESTHESIA POSTPROCEDURE EVALUATION
Post-Op Assessment Note    CV Status:  Stable  Pain Score: 0    Pain management: adequate       Mental Status:  Sleepy and arousable   PONV Controlled:  None   Airway Patency:  Patent     Post Op Vitals Reviewed: Yes    No anethesia notable event occurred.    Staff: MARY               /91 (03/01/24 1417)    Temp (!) 97.1 °F (36.2 °C) (03/01/24 1417) 97.1   Pulse 80 (03/01/24 1417)   Resp 16 (03/01/24 1417)    SpO2 95 % (03/01/24 1417)
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
## 2024-03-01 NOTE — INTERVAL H&P NOTE
H&P reviewed. After examining the patient I find no changes in the patients condition since the H&P had been written.    Vitals:    03/01/24 0835   BP: 117/88   Pulse: 61   Resp: 18   Temp: (!) 96.9 °F (36.1 °C)   SpO2: 100%

## 2024-03-01 NOTE — OP NOTE
OPERATIVE REPORT  PATIENT NAME: Manju Guido    :  1970  MRN: [id number]  Pt Location:  OR ROOM 08    SURGERY DATE: 3/1/2024    Surgeons and Role:     * Merlin Hernandez MD - Primary     * Lakisha De Leon    Preop Diagnosis:  Hypertrophy of breast [N62]  Other back pain, unspecified chronicity [M54.89]  Lipodystrophy [E88.1]    Post-Op Diagnosis Codes:     * Hypertrophy of breast [N62]     * Other back pain, unspecified chronicity [M54.89]     * Lipodystrophy [E88.1]    Procedure(s):  Bilateral - BREAST REDUCTION  LIPOSUCTION UPPER BACK    Specimen(s):  * No specimens in log *    Estimated Blood Loss:   Minimal    Drains:  * No LDAs found *    Anesthesia Type:   General    Operative Indications:  Hypertrophy of breast [N62]  Other back pain, unspecified chronicity [M54.89]  Lipodystrophy [E88.1]      Operative Findings:      Complications:   None    Procedure and Technique:  The patient was marked while standing in the preoperative holding area. The patient was brought to the operating room and placed supine on the operating table. A time-out procedure was performed. Sequential compression devices were applied. IV antibiotics were given. After adequate anesthesia was obtained, the patient was turned into a prone position with all appropriate pressure precautions taken.  The back was prepped and draped using standard surgical technique.  Stab incisions were made in the back and tumescent anesthesia was infiltrated in the subcutaneous plane for the back.  After adequate time for hemostatic effect liposuction was performed in a subcutaneous plane for the back.  Clear yellow Lipo aspirate was obtained. 1850ml of fat was aspirated.  An excellent contour was obtained.  The stab incisions were closed using 4-0 PDS suture in interrupted deep dermal technique followed by skin glue.  The patient was then turned to a supine position.    The chest was prepped and draped using standard surgical    technique.       Attention was turned to the right breast. The patient had been marked with Wise pattern inferior pedicle technique with 8 cm vertical limbs. An 8 cm wide inferior-based pedicle was designed at the breast meridian. The pedicle was de-epithelialized after designing    a 38 mm nipple areolar cut out. The medial and lateral borders of the pedicle were dissected down to just superficial to the pectoral fascia. The medial and lateral triangles were excised. Following this, the superior border of the pedicle was divided down to the muscular fascia. At this point, the pedicle was examined. Excellent vascularity was noted at the distal aspect. At this point the decision was made to excise this superior portion of the Deras pattern. After this tumescent anesthesia was infiltrated into the lateral breast. Direct trimming of the superior breast flap was performed using a curved Zaragoza scissor to obtain healthy flap thickness. At this point liposuction using a 3 Latvian Mercedes style cannula was performed of the lateral breast for a total of 400mL. The direct excision of breast tissue was 309 grams. At this point, the wounds were copiously irrigated. Excellent hemostasis was achieved using electrocautery. The inferior pedicle was tacked to the pectoral fascia using 2-0 Vicryl suture. The vertical limbs were secured to the inframammary fold using 0 Prolene suture in half buried mattress technique. The skin was closed using 2-0 Vicryl, 3-0 Vicryl and 3-0 PDS suture in interrupted deep dermal technique.  There was excellent vascularity of the nipple    at the end of the closure.     Attention was turned to the left breast. The patient had been marked with Wise pattern inferior pedicle technique with 8 cm vertical limbs. An 8 cm wide inferior-based pedicle was designed at the breast meridian. The pedicle was de-epithelialized after designing    a 38 mm nipple areolar cut out. The medial and lateral borders of the pedicle were  dissected down to just superficial to the pectoral fascia. The medial and lateral triangles were excised. Following this, the superior border of the pedicle was divided down to the muscular fascia. At this point, the pedicle was examined. Excellent vascularity was noted at the distal aspect. At this point the decision was made to excise this superior portion of the Deras pattern. After this tumescent anesthesia was infiltrated into the lateral breast. Direct trimming of the superior breast flap was performed using a curved Zaragoza scissor to obtain healthy flap thickness. At this point liposuction using a 3 Mongolian Mercedes style cannula was performed of the lateral breast for a total of 400mL. The direct excision of breast tissue was 353 grams. At this point, the wounds were copiously irrigated. Excellent hemostasis was achieved using electrocautery. The inferior pedicle was tacked to the pectoral fascia using 2-0 Vicryl suture. The vertical limbs were secured to the inframammary fold using 0 Prolene suture in half buried mattress technique. The skin was closed using 2-0 Vicryl, 3-0 Vicryl and 3-0 PDS suture in interrupted deep dermal technique. There was excellent vascularity of the nipple    at the end of the closure.          There was excellent symmetry between both breasts. All the wounds were cleaned and dried.  The superficial skin of both breasts was closed using 3-0 monocryl suture in running subcuticular technique.    All the    wounds were cleaned and dried. Skin glue, ABD dressings, a garment were applied. The patient tolerated the procedure well, was    extubated in the operating room and taken to the recovery room in    stable condition.        I was present for the entire procedure. and A qualified resident physician was not available.    Patient Disposition:  hemodynamically stable and extubated and stable        SIGNATURE: Merlin Hernandez MD  DATE: March 1, 2024  TIME: 10:11 AM

## 2024-03-06 PROCEDURE — 88305 TISSUE EXAM BY PATHOLOGIST: CPT | Performed by: PATHOLOGY

## 2024-03-07 ENCOUNTER — APPOINTMENT (EMERGENCY)
Dept: RADIOLOGY | Facility: HOSPITAL | Age: 54
End: 2024-03-07
Payer: COMMERCIAL

## 2024-03-07 ENCOUNTER — HOSPITAL ENCOUNTER (EMERGENCY)
Facility: HOSPITAL | Age: 54
Discharge: HOME | End: 2024-03-07
Attending: EMERGENCY MEDICINE | Admitting: EMERGENCY MEDICINE
Payer: COMMERCIAL

## 2024-03-07 VITALS
BODY MASS INDEX: 30.04 KG/M2 | TEMPERATURE: 99.1 F | WEIGHT: 175 LBS | RESPIRATION RATE: 16 BRPM | DIASTOLIC BLOOD PRESSURE: 85 MMHG | SYSTOLIC BLOOD PRESSURE: 131 MMHG | HEART RATE: 85 BPM | OXYGEN SATURATION: 96 %

## 2024-03-07 DIAGNOSIS — M79.605 PAIN OF LEFT LOWER EXTREMITY: Primary | ICD-10-CM

## 2024-03-07 PROCEDURE — 93971 EXTREMITY STUDY: CPT | Mod: LT

## 2024-03-07 PROCEDURE — 99284 EMERGENCY DEPT VISIT MOD MDM: CPT | Mod: 25

## 2024-03-07 ASSESSMENT — ENCOUNTER SYMPTOMS
PALPITATIONS: 0
CHILLS: 0
FEVER: 0
LIGHT-HEADEDNESS: 0
DIAPHORESIS: 0
SHORTNESS OF BREATH: 0
COUGH: 0
WEAKNESS: 0
DIZZINESS: 0
COLOR CHANGE: 0
BACK PAIN: 0

## 2024-03-08 ENCOUNTER — TELEPHONE (OUTPATIENT)
Dept: FAMILY MEDICINE | Facility: CLINIC | Age: 54
End: 2024-03-08
Payer: COMMERCIAL

## 2024-03-08 NOTE — DISCHARGE INSTRUCTIONS
Follow-up with PCP regarding recent ER visit and for continued care. Call for appointment. Recommend repeat ultrasound outpatient if symptoms continue.    Return to ER for any new or worsening symptoms such as but not limited to fever, chest pain, shortness of breath, worsening leg pain, weakness, numbness, dizziness/lightheadedness, syncope.

## 2024-03-08 NOTE — ED PROVIDER NOTES
Emergency Medicine Note  HPI   HISTORY OF PRESENT ILLNESS     Patient is a 54 year old female with a past medical history of aneurysm, hypertension, dyslipidemia, vertigo, stroke presenting for leg pain. Patient states she had a breast reduction last week without any acute complications. She then developed L calf pain yesterday with slight burning, concerning her for a blood clot. She denies any known trauma but does report standing on her toes a lot due to being unable to lift her arms. Denies any fever, chills, chest pain, shortness of breath, palpitations, back pain, leg swelling, dizziness, lightheadedness, weakness, syncope.             Patient History   PAST HISTORY     Reviewed from Nursing Triage:       Past Medical History:   Diagnosis Date   • Aneurysm (CMS/HCC)    • Hypertension    • Lipid disorder    • Positional vertigo    • Stroke (CMS/HCC)        Past Surgical History:   Procedure Laterality Date   • HYSTEROSCOPY W/ ENDOMETRIAL ABLATION     • PATENT FORAMEN OVALE CLOSURE     • SKIN BIOPSY         Family History   Problem Relation Age of Onset   • Heart attack Biological Mother    • Hyperlipidemia Biological Mother    • Stroke Biological Mother    • Heart disease Biological Father        Social History     Tobacco Use   • Smoking status: Former     Packs/day: 0.50     Years: 18.00     Additional pack years: 0.00     Total pack years: 9.00     Types: Cigarettes     Quit date:      Years since quittin.1   • Smokeless tobacco: Never   Vaping Use   • Vaping Use: Never used   Substance Use Topics   • Alcohol use: Yes     Alcohol/week: 5.0 standard drinks of alcohol     Types: 5 Standard drinks or equivalent per week     Comment: weekends   • Drug use: Not Currently         Review of Systems   REVIEW OF SYSTEMS     Review of Systems   Constitutional: Negative for chills, diaphoresis and fever.   Respiratory: Negative for cough and shortness of breath.    Cardiovascular: Negative for chest pain,  palpitations and leg swelling.   Musculoskeletal: Negative for back pain.        L calf pain   Skin: Negative for color change.   Neurological: Negative for dizziness, syncope, weakness and light-headedness.         VITALS     ED Vitals    Date/Time Temp Pulse Resp BP SpO2 [facility]   03/07/24 1919 37.3 °C (99.1 °F) 85 16 131/85 96 % BC        Pulse Ox %: 96 % (03/07/24 2030)  Pulse Ox Interpretation: Normal (03/07/24 2030)           Physical Exam   PHYSICAL EXAM     Physical Exam  Constitutional:       General: She is not in acute distress.     Appearance: Normal appearance. She is not ill-appearing, toxic-appearing or diaphoretic.   HENT:      Head: Normocephalic and atraumatic.      Mouth/Throat:      Mouth: Mucous membranes are moist.      Pharynx: Oropharynx is clear.   Eyes:      Extraocular Movements: Extraocular movements intact.      Conjunctiva/sclera: Conjunctivae normal.      Pupils: Pupils are equal, round, and reactive to light.   Cardiovascular:      Rate and Rhythm: Normal rate and regular rhythm.      Pulses:           Dorsalis pedis pulses are 2+ on the right side and 2+ on the left side.        Posterior tibial pulses are 2+ on the right side and 2+ on the left side.   Pulmonary:      Effort: Pulmonary effort is normal. No respiratory distress.      Breath sounds: Normal breath sounds. No wheezing, rhonchi or rales.   Musculoskeletal:      Cervical back: Normal range of motion and neck supple.      Left knee: No bony tenderness. Normal range of motion. No tenderness. Normal pulse.      Right lower leg: No swelling, tenderness or bony tenderness. No edema.      Left lower leg: No swelling, tenderness or bony tenderness. No edema.      Left ankle: No tenderness. Normal range of motion. Normal pulse.      Left foot: Normal range of motion. No tenderness or bony tenderness.   Skin:     General: Skin is warm and dry.      Capillary Refill: Capillary refill takes less than 2 seconds.   Neurological:       Mental Status: She is alert and oriented to person, place, and time.           PROCEDURES     Procedures     DATA     Results     None          Imaging Results          US venous leg left (Final result)  Result time 03/07/24 20:07:02    Final result                 Impression:    IMPRESSION:  No sonographic evidence of acute deep venous thrombosis in the femoral-popliteal  system in the left lower extremity.    COMMENT:  Real-time duplex sonography of the deep veins of the left lower extremity was  performed with color and spectral Doppler including compression.    The common femoral, superficial femoral, popliteal  and calf veins are normally  compressible with spontaneous phasic wave forms in the left lower extremity.  No  intraluminal filling defect is identified. There is normal color Doppler flow.                   Narrative:      CLINICAL HISTORY:Left lower extremity swelling                                No orders to display       Scoring tools                                  ED Course & MDM   MDM / ED COURSE / CLINICAL IMPRESSION / DISPO     Medical Decision Making  Pain of left lower extremity: acute illness or injury  Amount and/or Complexity of Data Reviewed  Radiology:  Decision-making details documented in ED Course.      Risk  OTC drugs.          ED Course as of 03/08/24 0544   Thu Mar 07, 2024   2028 US venous leg left  IMPRESSION:  No sonographic evidence of acute deep venous thrombosis in the femoral-popliteal  system in the left lower extremity. [AB]   Fri Mar 08, 2024   0544 Reviewed imaging results with patient.  She will follow-up closely outpatient with PCP.  Strict return precautions discussed.  Patient expressed understanding and is agreeable plan [AB]      ED Course User Index  [AB] Alma Daniel PA C     Clinical Impression      Pain of left lower extremity     _________________     ED Disposition   Discharge                   Alma Daniel PA C  03/08/24 0545

## 2024-03-08 NOTE — ED ATTESTATION NOTE
The patient was seen by the physician assistant. I was present in the emergency department and available for any questions or consultations.     Alfred Beck,   03/07/24 2032

## 2024-03-08 NOTE — TELEPHONE ENCOUNTER
Left Voicemail to Return call to office ask for MACIE Longoria to performed ED follow-up call.   [facility]  [place]  Address: [address], [place] [address]  Phone: [phone]     To discuss the following :  - Patient doing well and fully understands discharge instructions.  - To Verify that patient has no additional needs identified at this time.    -To schedule a follow up pcp

## 2024-08-20 RX ORDER — VALACYCLOVIR HYDROCHLORIDE 1 G/1
TABLET, FILM COATED ORAL
Qty: 30 TABLET | Refills: 0 | Status: SHIPPED
Start: 2024-08-20 | End: 2024-11-11

## 2024-08-22 ENCOUNTER — TELEPHONE (OUTPATIENT)
Dept: SCHEDULING | Facility: CLINIC | Age: 54
End: 2024-08-22
Payer: COMMERCIAL

## 2024-08-22 NOTE — TELEPHONE ENCOUNTER
Left a detailed message for patient to call us back and schedule a f/u appt with Dr. Skaggs after the test are completed.    Auth information for echo and cta left in the message.

## 2024-08-22 NOTE — TELEPHONE ENCOUNTER
Echo @ [facility]: [phone]            8/22/24-11/19/24     CTA @ [facility]: [phone]         8/22/24-11/19/24      Please call pt to give auth info and to assist with scheduling testing. Thanks!

## 2024-09-10 NOTE — TELEPHONE ENCOUNTER
Dr. Pineda        Please sign off on form if you agree to: Family Medical Leave Act Medical certification  (place your signature on the first page only)     -From your Inbasket, Highlight the patient and click Chart   -Double click the 08/28/24 Forms Completion telephone encounter  -Scroll down to the Media section   -Click the blue Hyperlink: Family Medical Leave Act Dr Pineda 09/05/24    -Click Acknowledge located in the top right ribbon/menu   -Drag the mouse into the blank space of the document and a + sign will appear. Left click to   electronically sign the document.     Thank you,     Tito'    Pt needs to get an MRA done (not scheduled yet, poss in Dec) and she wanted to check to see if that is ok to have done with her device.  Pt can be reached at [phone].

## 2024-10-02 LAB
ALBUMIN SERPL-MCNC: 4.8 G/DL (ref 3.8–4.9)
ALP SERPL-CCNC: 57 IU/L (ref 44–121)
ALT SERPL-CCNC: 19 IU/L (ref 0–32)
AST SERPL-CCNC: 28 IU/L (ref 0–40)
BASOPHILS # BLD AUTO: 0 X10E3/UL (ref 0–0.2)
BASOPHILS NFR BLD AUTO: 1 %
BILIRUB SERPL-MCNC: 0.6 MG/DL (ref 0–1.2)
BUN SERPL-MCNC: 15 MG/DL (ref 6–24)
BUN/CREAT SERPL: 16 (ref 9–23)
CALCIUM SERPL-MCNC: 10.4 MG/DL (ref 8.7–10.2)
CHLORIDE SERPL-SCNC: 99 MMOL/L (ref 96–106)
CHOLEST SERPL-MCNC: 197 MG/DL (ref 100–199)
CO2 SERPL-SCNC: 23 MMOL/L (ref 20–29)
CREAT SERPL-MCNC: 0.91 MG/DL (ref 0.57–1)
EGFRCR SERPLBLD CKD-EPI 2021: 76 ML/MIN/1.73
EOSINOPHIL # BLD AUTO: 0.1 X10E3/UL (ref 0–0.4)
EOSINOPHIL NFR BLD AUTO: 2 %
ERYTHROCYTE [DISTWIDTH] IN BLOOD BY AUTOMATED COUNT: 13.3 % (ref 11.7–15.4)
GLOBULIN SER CALC-MCNC: 2.4 G/DL (ref 1.5–4.5)
GLUCOSE SERPL-MCNC: 104 MG/DL (ref 70–99)
HCT VFR BLD AUTO: 39.1 % (ref 34–46.6)
HDLC SERPL-MCNC: 67 MG/DL
HGB BLD-MCNC: 13 G/DL (ref 11.1–15.9)
IMM GRANULOCYTES # BLD AUTO: 0 X10E3/UL (ref 0–0.1)
IMM GRANULOCYTES NFR BLD AUTO: 0 %
LDLC SERPL CALC-MCNC: 114 MG/DL (ref 0–99)
LYMPHOCYTES # BLD AUTO: 3.1 X10E3/UL (ref 0.7–3.1)
LYMPHOCYTES NFR BLD AUTO: 50 %
MCH RBC QN AUTO: 30.2 PG (ref 26.6–33)
MCHC RBC AUTO-ENTMCNC: 33.2 G/DL (ref 31.5–35.7)
MCV RBC AUTO: 91 FL (ref 79–97)
MONOCYTES # BLD AUTO: 0.4 X10E3/UL (ref 0.1–0.9)
MONOCYTES NFR BLD AUTO: 7 %
MORPHOLOGY BLD-IMP: NORMAL
NEUTROPHILS # BLD AUTO: 2.5 X10E3/UL (ref 1.4–7)
NEUTROPHILS NFR BLD AUTO: 40 %
PLATELET # BLD AUTO: NORMAL X10E3/UL
POTASSIUM SERPL-SCNC: 3.9 MMOL/L (ref 3.5–5.2)
PROT SERPL-MCNC: 7.2 G/DL (ref 6–8.5)
RBC # BLD AUTO: 4.3 X10E6/UL (ref 3.77–5.28)
SODIUM SERPL-SCNC: 140 MMOL/L (ref 134–144)
TRIGL SERPL-MCNC: 92 MG/DL (ref 0–149)
VLDLC SERPL CALC-MCNC: 16 MG/DL (ref 5–40)
WBC # BLD AUTO: 6.2 X10E3/UL (ref 3.4–10.8)

## 2024-10-08 ENCOUNTER — HOSPITAL ENCOUNTER (OUTPATIENT)
Dept: RADIOLOGY | Facility: HOSPITAL | Age: 54
Discharge: HOME | End: 2024-10-08
Attending: INTERNAL MEDICINE
Payer: COMMERCIAL

## 2024-10-08 ENCOUNTER — HOSPITAL ENCOUNTER (OUTPATIENT)
Dept: CARDIOLOGY | Facility: HOSPITAL | Age: 54
Discharge: HOME | End: 2024-10-08
Attending: INTERNAL MEDICINE
Payer: COMMERCIAL

## 2024-10-08 VITALS
HEIGHT: 64 IN | WEIGHT: 175 LBS | SYSTOLIC BLOOD PRESSURE: 130 MMHG | BODY MASS INDEX: 29.88 KG/M2 | HEART RATE: 56 BPM | DIASTOLIC BLOOD PRESSURE: 80 MMHG

## 2024-10-08 DIAGNOSIS — I77.810 DILATED AORTIC ROOT (CMS/HCC): ICD-10-CM

## 2024-10-08 LAB
AORTIC ROOT ANNULUS - M-MODE: 3.1 CM
AORTIC ROOT ANNULUS: 3.6 CM
AORTIC VALVE MEAN VELOCITY: 0.94 M/S
AORTIC VALVE VELOCITY TIME INTEGRAL: 34 CM
ASCENDING AORTA: 4.1 CM
AV MEAN GRADIENT: 4 MMHG
AV PEAK GRADIENT: 7 MMHG
AV PEAK VELOCITY-S: 1.29 M/S
AV VALVE AREA INDEX: 1.44
AV VALVE AREA: 2.27 CM2
AV VELOCITY RATIO: 0.72
AVA (VTI): 2.73 CM2
BSA FOR ECHO PROCEDURE: 1.89 M2
CUSP SEPARATION: 1.8 CM
DOP CALC LVOT STROKE VOLUME: 92.71 ML
E WAVE DECELERATION TIME: 162 MS
E/A RATIO: 0.8
E/E' RATIO: 8.6
E/LAT E' RATIO: 6.3
EDV (BP): 44.2 ML/M2
EF (A4C): 61.5 %
EF A2C: 69.7 %
EJECTION FRACTION: 65.5 %
ESV (BP): 15.2 ML/M2
FRACTIONAL SHORTENING: 35.22 %
HEART RATE: 56 BPM
INTERVENTRICULAR SEPTUM: 0.78 CM
LA ESV (BP): 46.5 CM3
LA ESV INDEX (A2C): 23.23 CM3/M2
LA ESV INDEX (BP): 24.6 CM3/M2
LA/AORTA RATIO: 1.11
LAAS-AP2: 17.8 CM2
LAAS-AP4: 18 CM2
LAD 2D: 4 CM
LALD A4C: 5.49 CM
LALD A4C: 5.76 CM
LAV-S: 43.9 CM3
LEFT ATRIUM VOLUME INDEX: 25.61 CM3/M2
LEFT ATRIUM VOLUME: 48.4 CM3
LEFT INTERNAL DIMENSION IN SYSTOLE: 2.98 CM (ref 2.58–3.9)
LEFT VENTRICLE DIASTOLIC VOLUME INDEX: 44.34 CM3/M2
LEFT VENTRICLE DIASTOLIC VOLUME: 83.8 CM3
LEFT VENTRICLE MASS INDEX: 58.73 G/M2
LEFT VENTRICLE SYSTOLIC VOLUME INDEX: 17.04 CM3/M2
LEFT VENTRICLE SYSTOLIC VOLUME: 32.2 CM3
LEFT VENTRICULAR INTERNAL DIMENSION IN DIASTOLE: 4.6 CM (ref 4.36–6.06)
LEFT VENTRICULAR MASS: 111 G
LEFT VENTRICULAR POSTERIOR WALL IN END DIASTOLE: 0.75 CM (ref 0.57–1.07)
LV DIASTOLIC VOLUME: 78.3 CM3
LV ESV (APICAL 2 CHAMBER): 23.7 CM3
LVAD-AP2: 28.3 CM2
LVAD-AP4: 30 CM2
LVAS-AP2: 14.3 CM2
LVAS-AP4: 17.1 CM2
LVEDVI(A2C): 41.43 CM3/M2
LVESVI(A2C): 12.54 CM3/M2
LVLD-AP2: 8.62 CM
LVLD-AP4: 8.81 CM
LVLS-AP2: 7.22 CM
LVLS-AP4: 7.56 CM
LVOT 2D: 2.2 CM
LVOT A: 3.8 CM2
LVOT MG: 2 MMHG
LVOT MV: 0.69 M/S
LVOT PEAK VELOCITY: 0.98 M/S
LVOT PG: 4 MMHG
LVOT STROKE VOLUME INDEX: 49.05 ML/M2
LVOT VTI: 24.4 CM
MLH CV ECHO AVA INDEX VELOCITY RATIO: 1.2
MV E'TISSUE VEL-LAT: 0.12 M/S
MV E'TISSUE VEL-MED: 0.09 M/S
MV PEAK A VEL: 0.92 M/S
MV PEAK E VEL: 0.78 M/S
MV STENOSIS PRESSURE HALF TIME: 47 MS
MV VALVE AREA P 1/2 METHOD: 4.68 CM2
POSTERIOR WALL: 0.75 CM
PULM VEIN S/D RATIO: 1.3
PV PEAK D VEL: 0.42 M/S
PV PEAK S VEL: 0.53 M/S
RVOT VMAX: 0.81 M/S
RVOT VTI: 17.5 CM
SEPTAL TISSUE DOPPLER FREE WALL LATE DIA VELOCITY (APICAL 4 CHAMBER VIEW): 0.11 M/S
TAPSE: 2.65 CM
Z-SCORE OF LEFT VENTRICULAR DIMENSION IN END DIASTOLE: -1.11
Z-SCORE OF LEFT VENTRICULAR DIMENSION IN END SYSTOLE: -0.49
Z-SCORE OF LEFT VENTRICULAR POSTERIOR WALL IN END DIASTOLE: -0.2

## 2024-10-08 PROCEDURE — 63600105 HC IODINE BASED CONTRAST: Mod: JZ

## 2024-10-08 PROCEDURE — 93306 TTE W/DOPPLER COMPLETE: CPT | Mod: 26 | Performed by: INTERNAL MEDICINE

## 2024-10-08 PROCEDURE — 93306 TTE W/DOPPLER COMPLETE: CPT

## 2024-10-08 PROCEDURE — 71275 CT ANGIOGRAPHY CHEST: CPT

## 2024-10-08 RX ORDER — IOPAMIDOL 755 MG/ML
100 INJECTION, SOLUTION INTRAVASCULAR
Status: COMPLETED | OUTPATIENT
Start: 2024-10-08 | End: 2024-10-08

## 2024-10-08 RX ADMIN — IOPAMIDOL 100 ML: 755 INJECTION, SOLUTION INTRAVENOUS at 13:30

## 2024-10-08 NOTE — PROGRESS NOTES
"Orly Lara   [id number]    Your doctor has referred you for a CT examination that requires the injection of an iodinated contrast material into your bloodstream. This iodinated contrast material, sometimes referred to as \"x-ray dye\" allows for better interpretation of the x-ray films or CT images and results in a more accurate interpretation of the examination.     Without the use of iodinated contrast (x-ray dye), the examination may be less informative and result in a suboptimal examination, and possibly a delay in diagnosis and, if needed, treatment.     The iodinated contrast material is given through a small needle or catheter placed into a vein, usually on the inside of the elbow, on the back of hand, or in a vein in the foot or lower leg.    The most common, though still rare, potential reaction to an intravenous contrast injection is an allergic-like reaction. Most allergic-like reactions are mild, though a small subset of people can have more severe reactions. Mild reactions include mild / scattered hives, itching, scratchy throat, sneezing and nasal congestion. More severe reactions include facial swelling, severe difficulty breathing, wheezing and anaphylactic shock. Those with a prior history allergic-like reaction to the same class of contrast media (such as CT contrast or MRI contrast) are at the highest risk for an allergic reaction.     If you believe you had an allergic reaction to contrast in the past, please let our staff know. We can determine if this increases your risk for a future reaction and provide steps to decrease the risk. This may delay your examination, but it decreases the risk of having a new and possibly more severe reaction to the contrast injection.    People with a history of prior allergic reactions to other substances (such as unrelated medications and food) and patients with a history of asthma have slightly increased risk for an allergic reaction from contrast material " when compared with the general population, but do not require to be pretreated prior to a contrast injection.    You should notify the physician, nurse or technologist if you have ever had any of these conditions or if you have any questions.

## 2024-10-14 ENCOUNTER — OFFICE VISIT (OUTPATIENT)
Dept: CARDIOLOGY | Facility: CLINIC | Age: 54
End: 2024-10-14
Payer: COMMERCIAL

## 2024-10-14 VITALS
SYSTOLIC BLOOD PRESSURE: 124 MMHG | WEIGHT: 175 LBS | BODY MASS INDEX: 29.88 KG/M2 | HEART RATE: 66 BPM | HEIGHT: 64 IN | DIASTOLIC BLOOD PRESSURE: 80 MMHG | OXYGEN SATURATION: 99 %

## 2024-10-14 DIAGNOSIS — Z87.74 H/O AMPLATZER ATRIAL SEPTAL DEFECT CLOSURE: ICD-10-CM

## 2024-10-14 DIAGNOSIS — I77.810 DILATED AORTIC ROOT (CMS/HCC): Primary | ICD-10-CM

## 2024-10-14 DIAGNOSIS — E78.49 FAMILIAL HYPERLIPIDEMIA: ICD-10-CM

## 2024-10-14 DIAGNOSIS — Q21.12 PFO (PATENT FORAMEN OVALE): ICD-10-CM

## 2024-10-14 DIAGNOSIS — I67.1 BRAIN ANEURYSM: ICD-10-CM

## 2024-10-14 LAB
ATRIAL RATE: 60
P AXIS: 46
PR INTERVAL: 190
QRS DURATION: 78
QT INTERVAL: 384
QTC CALCULATION(BAZETT): 384
R AXIS: 43
T WAVE AXIS: 39
VENTRICULAR RATE: 60

## 2024-10-14 PROCEDURE — 3008F BODY MASS INDEX DOCD: CPT | Performed by: STUDENT IN AN ORGANIZED HEALTH CARE EDUCATION/TRAINING PROGRAM

## 2024-10-14 PROCEDURE — 99214 OFFICE O/P EST MOD 30 MIN: CPT | Performed by: STUDENT IN AN ORGANIZED HEALTH CARE EDUCATION/TRAINING PROGRAM

## 2024-10-14 PROCEDURE — 93000 ELECTROCARDIOGRAM COMPLETE: CPT | Performed by: STUDENT IN AN ORGANIZED HEALTH CARE EDUCATION/TRAINING PROGRAM

## 2024-10-14 RX ORDER — ESTRADIOL 10 UG/1
10 INSERT VAGINAL 2 TIMES WEEKLY
COMMUNITY
Start: 2024-10-03

## 2024-10-14 ASSESSMENT — ENCOUNTER SYMPTOMS
AGITATION: 0
LIGHT-HEADEDNESS: 0
MYALGIAS: 0
SHORTNESS OF BREATH: 0
HALLUCINATIONS: 0
STRIDOR: 0
HEMATURIA: 0
EYE DISCHARGE: 0
FACIAL SWELLING: 0
DYSURIA: 0
ABDOMINAL PAIN: 0
FATIGUE: 0
DIAPHORESIS: 0
PALPITATIONS: 0
JOINT SWELLING: 0
VOMITING: 0
EYE REDNESS: 0
COLOR CHANGE: 0

## 2024-10-14 NOTE — PROGRESS NOTES
Km Skaggs MD, [facility]  Non-invasive Cardiology    [facility]  [facility]  [address]  [address]  [place] [address]    [facility] [place]  [address] [place] [address]     [phone]    [facility]     10/14/2024        Patient Name: Orly Lara  Account:          [id number]  :               1970        Dear Mimi Kramer, DO:     I had the pleasure of seeing your patient, Orly Lara, on 10/14/2024. As you know, she is a 54 y.o. female with medical history as described below.     HPI  54 y.o. female with a history of ASD s/p repair (Amplatzer device-), hx of CVA (), aortic root dilation, familial hyperlipidemia, and intracerebral aneurysm who presents for cardiovascular evaluation.  At today's visit the patient reports feeling well.  She denies any chest pains, shortness of breath, or other anginal equivalents.  She exercises regularly and feels well doing so without any cardiovascular complaints.  She also denies any palpitations or fast regular heartbeats nor any episodes of presyncope/syncope.  She monitors her blood pressures periodically at home and states they are typically in the 110s to 120s over 70s millimeters of mercury.  She remains compliant with all of her medications and has no major complaints for today.        CARDIOVASCULAR STUDIES:     Lab Results   Component Value Date    CHOL 197 10/01/2024    CHOL 196 2024    TRIG 92 10/01/2024    TRIG 76 2024    HDL 67 10/01/2024    HDL 72 2024    LDLCALC 114 (H) 10/01/2024    LDLCALC 110 (H) 2024    NONHDLCALC 127 10/31/2018       ECG: Independently reviewed: Normal sinus rhythm, nonspecific T wave abnormality     ECHOCARDIOGRAM:  Results for orders placed during the hospital encounter of 10/08/24    Transthoracic echo (TTE) complete    Interpretation Summary  Sinus bradycardia at 56 bpm.  Normal left  ventricular cavity size.  Normal wall thickness.  Ejection fraction 65%.  Normal diastolic function.    Normal mitral valve.  There is a 25 mm Amplatzer closure device across the interatrial septum deployed February 19.    Normal aortic valve.  Normal aortic root.  The visualized portion of the proximal ascending aorta is dilated to 4.1 cm.  The aortic arch is also dilated to 3.7 cm.  Consider further imaging to size the whole aorta.    Normal right heart.    No significant pericardial effusion.    Results for orders placed during the hospital encounter of 03/13/19    Transthoracic echo (TTE) limited    Interpretation Summary  Cannot rule out small residual PFO s/p closure with 25mm Amplatzer PFO device. Mildly dilated aortic root 3.9 cm other chamber size is normal  No regional wall motion abnormality preserved ejection fraction 70%  No structural valvular abnormalities  Mild tricuspid regurgitation normal pulmonary pressures of 25  Trace mitral regurgitation      JOSUÉ:  Results for orders placed during the hospital encounter of 11/30/18    Transesophageal echo (JOSUÉ)    Interpretation Summary  JOSUÉ performed to assess for PFO and left atrial appendage clot.  The risks of the procedure were discussed with the patient and informed consent was obtained. The patient was sedated with 10 mg IV Versed and 100 mcg IV Fentanyl. The JOSUÉ probe was inserted without difficulty and the patient tolerated the procedure well.  Bubble study performed with agitated saline.    1. Normal left ventricular wall thickness and cavity with preserved systolic function. Estimated EF 65%. No regional wall motion abnormalities.  2. Three cusped aortic valve.  Mild aortic regurgitation.  Normal aortic dimensions. Simple atheroma in the visualized portions of the descending aorta without complex elements.  3. Normal mitral valve.  Trace mitral regurgitation.  Systolic predominance of pulmonary vein flow by spectral doppler.  4. Normal left atrial  size. No thrombus or mass seen in the left atrium or left atrial appendage. Left atrial appendage peak emptying velocity 40cm/s.  5. Evidence of small PFO with left to right shunt with agitated saline.  6. Normal right ventricular size with preserved systolic function. Normal right atrial size.  7. Structurally normal tricuspid valve. Trace tricuspid regurgitation. Insufficient jet to estimate right ventricular systolic pressure.  8. Grossly normal pulmonic valve. No significant pulmonic regurgitation.  9. No pericardial effusion.  10. No previous study for direct comparison.      CORONARY CALCIUM SCAN:  Results for orders placed during the hospital encounter of 10/13/23    CT HEART CORONARY ARTERY CALCIUM SCORE WITHOUT IV CONTRAST    Narrative  CLINICAL HISTORY: Personal history of congenital malformations of the heart.  Hyperglycemia. Thoracic aortic ectasia. Hyperlipidemia.    COMMENT:    CT DOSE:  One or more dose reduction techniques (e.g. automated exposure  control, adjustment of the mA and/or kV according to patient size, use of  iterative reconstruction technique) utilized for this examination.    1) PROCEDURE: Cardiac gated axial CT noncontrast imaging of the heart was  performed. Images were processed with dedicated [employer] software on a dedicated  PACS Workstation, reviewed by the Radiologist and the coronary artery calcium  score was tabulated.    CT DOSE:  One or more dose reduction techniques (e.g. automated exposure  control, adjustment of the mA and/or kV according to patient size, use of  iterative reconstruction technique) utilized for this examination.    2) CARDIAC FINDINGS:    CORONARY CALCIUM COMPOSITE AGATSTON SCORE: 0    Left Main: 0    LAD: 0    LCX: 0    RCA: 0      3) NON-CARDIAC FINDINGS    AORTA: Stable aneurysmal dilatation of ascending aorta measuring 4.3 cm in AP  diameter at the level of the right pulmonary artery unchanged compared to  4/5/2021.    PULMONARY VEINS:  "Unremarkable.    PULMONARY ARTERIES: Unremarkable.    SVC: Unremarkable.    IVC: Unremarkable.    LUNG SEN: Limited Evaluation.  Unremarkable    LYMPH NODES: Unremarkable.    OSSEOUS STRUCTURES: Unremarkable.    OTHER: Amplatz closure device in the into atrial septum. Stable soft tissue in  the anterior mediastinum.    Impression  IMPRESSION: Ascending aortic aneurysm  1. Composite Agatston coronary artery calcium score of 0, which is between the 0  and 25 percentile for females between the ages of 45 and 54, as per ROCA 2006  Database. This correlates to \"no identifiable plaque\" and \"very low, generally  less than 5%\" risk of coronary artery disease.    LE VENOUS ULTRASOUND:    Results for orders placed during the hospital encounter of 03/07/24    US venous leg left    Narrative  CLINICAL HISTORY:Left lower extremity swelling    Impression  IMPRESSION:  No sonographic evidence of acute deep venous thrombosis in the femoral-popliteal  system in the left lower extremity.    COMMENT:  Real-time duplex sonography of the deep veins of the left lower extremity was  performed with color and spectral Doppler including compression.    The common femoral, superficial femoral, popliteal  and calf veins are normally  compressible with spontaneous phasic wave forms in the left lower extremity.  No  intraluminal filling defect is identified. There is normal color Doppler flow.      Chest CTA - 10/08/2024  IMPRESSION:      Aorta:  Aortic Annulus: 24 mm (normal 20-31 mm)  Sinus of Valsalva: 34 mm (normal 29-45 mm)  Sinotubular junction:3 2 mm (normal 22-36 mm)  Ascending Thoracic Aorta as measured at the level of the right main pulmonary  artery (image 41 series 2): 40 x 38 mm (normal less than 40 mm)  Thoracic aortic arch:24 mm (normal 22-36 mm)  Prominal descending thoracic aorta: 22 x 23 mm (normal 20-30 mm)  Mid descending thoracic aorta:23 x 22 mm (normal 20-30 mm)  Distal descending thoracic aorta:1 x 22 mm (normal 20-30 " mm)    1. Stable fusiform dilatation of ascending thoracic aorta measuring 40 mm.      Past Medical History:   Diagnosis Date    Aneurysm (CMS/HCC)     Hypertension     Lipid disorder     Positional vertigo     Stroke (CMS/HCC)         Past Surgical History   Procedure Laterality Date    Hysteroscopy w/ endometrial ablation      Patent foramen ovale closure      Patent foramen ovale closure N/A 2019    Performed by Isaías Saldana DO at [facility]ARDIAC CATH/EP    Reduction mammaplasty Bilateral 2024    Skin biopsy          Family History   Problem Relation Name Age of Onset    Heart attack Biological Mother      Hyperlipidemia Biological Mother      Stroke Biological Mother      Heart disease Biological Father          Social History     Socioeconomic History    Marital status:      Spouse name: None    Number of children: None    Years of education: None    Highest education level: None   Tobacco Use    Smoking status: Former     Current packs/day: 0.00     Average packs/day: 0.5 packs/day for 18.0 years (9.0 ttl pk-yrs)     Types: Cigarettes     Start date:      Quit date: 2006     Years since quittin.7    Smokeless tobacco: Never   Vaping Use    Vaping status: Never Used   Substance and Sexual Activity    Alcohol use: Yes     Alcohol/week: 5.0 standard drinks of alcohol     Types: 5 Standard drinks or equivalent per week     Comment: weekends    Drug use: Not Currently    Sexual activity: Yes     Partners: Male   Social History Narrative    Do you wear your seatbelt? Yes    Do you have smoke detector in your home? Yes    Do you have a carbon monoxide detector in your home? 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    Current Marital Status?         Current Outpatient Medications   Medication Sig Dispense Refill    ascorbic acid (VITAMIN C) 500 mg tablet Take 500 mg by mouth daily.      bempedoic acid-ezetimibe (NEXLIZET) 180-10 mg tablet Take 180 mg by mouth daily. 90 tablet  "3    estradioL 10 mcg tablet Insert 10 mcg into the vagina 2 (two) times a week (Mon, Thu).      hydrochlorothiazide (HYDRODIURIL) 25 mg tablet Take 1 tablet (25 mg total) by mouth daily. 90 tablet 3    Lactobacillus acidophilus (PROBIOTIC ORAL) Take 1 capsule by mouth daily.      valACYclovir (VALTREX) 1 gram tablet TAKE TWO TABLETS (2,000 MG TOTAL) BY MOUTH TWO (2) TIMES A DAY FOR TWO DOSES. (Patient taking differently: Take 1,000 mg by mouth as needed. TAKE TWO TABLETS (2,000 MG TOTAL) BY MOUTH TWO (2) TIMES A DAY FOR TWO DOSES.) 30 tablet 0    predniSONE (DELTASONE) 50 mg tablet Take 1 tablet (50 mg total) by mouth every 6 (six) hours for 3 doses. Take 1 tab 13 hrs, 1 tab 7 hrs, and 1 tab 1 hr before exam. (Patient not taking: Reported on 10/14/2024) 3 tablet 0     No current facility-administered medications for this visit.        Allergies:  Augmentin [amoxicillin-pot clavulanate], Iodinated contrast media, Lipitor [atorvastatin], Penicillin g benzathine, and Rosuvastatin       Review of Systems   Constitutional:  Negative for diaphoresis and fatigue.   HENT:  Negative for facial swelling and nosebleeds.    Eyes:  Negative for discharge and redness.   Respiratory:  Negative for shortness of breath and stridor.    Cardiovascular:  Negative for chest pain and palpitations.   Gastrointestinal:  Negative for abdominal pain and vomiting.   Genitourinary:  Negative for dysuria and hematuria.   Musculoskeletal:  Negative for joint swelling and myalgias.   Skin:  Negative for color change and rash.   Neurological:  Negative for syncope and light-headedness.   Psychiatric/Behavioral:  Negative for agitation and hallucinations.         Vitals:    10/14/24 0838   BP: 124/80   BP Location: Left upper arm   Patient Position: Sitting   Pulse: 66   SpO2: 99%   Weight: 79.4 kg (175 lb)   Height: 1.626 m (5' 4\")     Body mass index is 30.04 kg/m².     Physical Exam  Constitutional:       General: She is not in acute " distress.     Appearance: Normal appearance. She is not ill-appearing.   HENT:      Head: Normocephalic and atraumatic.      Nose: Nose normal.   Eyes:      General:         Right eye: No discharge.         Left eye: No discharge.      Conjunctiva/sclera: Conjunctivae normal.   Cardiovascular:      Rate and Rhythm: Normal rate and regular rhythm.      Heart sounds: No murmur heard.     No friction rub. No gallop.   Pulmonary:      Effort: No respiratory distress.      Breath sounds: No stridor. No wheezing, rhonchi or rales.   Abdominal:      General: There is no distension.      Tenderness: There is no guarding.   Musculoskeletal:      Right lower leg: No edema.      Left lower leg: No edema.   Skin:     General: Skin is warm and dry.   Neurological:      Mental Status: She is alert. Mental status is at baseline.   Psychiatric:         Mood and Affect: Mood normal.         Behavior: Behavior normal.             Diagnosis Plan   1. Dilated aortic [facility]      2. PFO (patent foramen ovale)  [facility] MUSE ECG 12 lead (clinic performed)      3. F[facility]ial hyperlipidemia  [facility])      4. Brain aneurysm        5. H/O Amplatzer atrial septal defect closure            ASSESSMENT AND PLAN:     54 y.o. female with a history of ASD s/p repair (Amplatzer device-2023), hx of CVA (2019), hypertension, aortic root dilation, familial hyperlipidemia, and intracerebral aneurysm who presents for cardiovascular evaluation.     # Familial hyperlipidemia  LDL relatively well-controlled.  History of statin intolerance.  Currently tolerating bempedoic acid-ezetimibe 180-10 mg daily well.  CAC 0 in 2023.     # HTN  Blood pressure well-controlled.  Continue HCTZ 25 mg daily.     #Aortic Dilation  Ascending aortic aneurysm measuring 4.0 cm.  Stable on recent chest CTA.  Blood pressure control as above.    #ASD s/p repair (Amplatzer device-2023)  Stable,  periodic echocardiograms for monitoring.     # Intracerebral aneurysm  Follow-up with neurology/neurosurgery          Return in about 6 months (around 4/14/2025).     I thank you for the opportunity to take part in the care of Orly Lara.  Please do not hesitate to contact me with any questions or concerns.     Sincerely,  Km Skaggs MD  Cardiovascular Disease  10/14/2024      This note was generated using speech recognition software. Please excuse any typographical errors. Please contact me with any questions or concerns.

## 2024-11-11 RX ORDER — VALACYCLOVIR HYDROCHLORIDE 1 G/1
TABLET, FILM COATED ORAL
Qty: 30 TABLET | Refills: 0 | Status: SHIPPED | OUTPATIENT
Start: 2024-11-11 | End: 2025-02-25 | Stop reason: SDUPTHER

## 2024-11-11 RX ORDER — VALACYCLOVIR HYDROCHLORIDE 1 G/1
TABLET, FILM COATED ORAL
Qty: 30 TABLET | Refills: 0 | Status: CANCELLED | OUTPATIENT
Start: 2024-11-11

## 2024-11-14 ENCOUNTER — OFFICE VISIT (OUTPATIENT)
Dept: FAMILY MEDICINE | Facility: CLINIC | Age: 54
End: 2024-11-14
Payer: COMMERCIAL

## 2024-11-14 VITALS
OXYGEN SATURATION: 97 % | HEART RATE: 70 BPM | TEMPERATURE: 97 F | HEIGHT: 64 IN | DIASTOLIC BLOOD PRESSURE: 70 MMHG | WEIGHT: 174.2 LBS | BODY MASS INDEX: 29.74 KG/M2 | SYSTOLIC BLOOD PRESSURE: 110 MMHG

## 2024-11-14 DIAGNOSIS — Z82.62 FH: OSTEOPOROSIS: ICD-10-CM

## 2024-11-14 DIAGNOSIS — Z12.31 BREAST CANCER SCREENING BY MAMMOGRAM: ICD-10-CM

## 2024-11-14 DIAGNOSIS — I47.10 SVT (SUPRAVENTRICULAR TACHYCARDIA) (CMS/HCC): ICD-10-CM

## 2024-11-14 DIAGNOSIS — Z78.0 ASYMPTOMATIC MENOPAUSE: ICD-10-CM

## 2024-11-14 DIAGNOSIS — Z00.00 ENCOUNTER FOR GENERAL ADULT MEDICAL EXAMINATION WITHOUT ABNORMAL FINDINGS: Primary | ICD-10-CM

## 2024-11-14 PROCEDURE — 99396 PREV VISIT EST AGE 40-64: CPT | Performed by: FAMILY MEDICINE

## 2024-11-14 PROCEDURE — 3008F BODY MASS INDEX DOCD: CPT | Performed by: FAMILY MEDICINE

## 2024-11-14 RX ORDER — HYDROCHLOROTHIAZIDE 25 MG/1
25 TABLET ORAL DAILY
Qty: 90 TABLET | Refills: 3 | Status: SHIPPED | OUTPATIENT
Start: 2024-11-14

## 2024-11-14 ASSESSMENT — PATIENT HEALTH QUESTIONNAIRE - PHQ9: SUM OF ALL RESPONSES TO PHQ9 QUESTIONS 1 & 2: 0

## 2024-11-14 NOTE — PROGRESS NOTES
HPI:  Orly Lara is an 54 y.o. female presenting for annual well visit.  Seeing integrative medicine specialist in [facility]. B12 low. A1C 6.2. Cholesterol good.    Breast reduction in March. Has not had mammogram since - goes to [place].        Allergies:  Augmentin [amoxicillin-pot clavulanate], Iodinated contrast media, Lipitor [atorvastatin], Penicillin g benzathine, and Rosuvastatin    Past Medical History:   Diagnosis Date    Aneurysm (CMS/HCC)     Hypertension     Lipid disorder     Positional vertigo     Stroke (CMS/HCC)        Past Surgical History   Procedure Laterality Date    Hysteroscopy w/ endometrial ablation      Patent foramen ovale closure      Patent foramen ovale closure N/A 2019    Performed by Isaías Saldana DO at [facility] CARDIAC CATH/EP    Reduction mammaplasty Bilateral 2024    Skin biopsy         Social History     Tobacco Use    Smoking status: Former     Current packs/day: 0.00     Average packs/day: 0.5 packs/day for 18.0 years (9.0 ttl pk-yrs)     Types: Cigarettes     Start date:      Quit date:      Years since quittin.8    Smokeless tobacco: Never   Substance Use Topics    Alcohol use: Yes     Alcohol/week: 5.0 standard drinks of alcohol     Types: 5 Standard drinks or equivalent per week     Comment: weekends       Current Outpatient Medications on File Prior to Visit   Medication Sig Dispense Refill    bempedoic acid-ezetimibe (NEXLIZET) 180-10 mg tablet Take 180 mg by mouth daily. 90 tablet 3    estradioL 10 mcg tablet Insert 10 mcg into the vagina 2 (two) times a week (Mon, Thu).      Lactobacillus acidophilus (PROBIOTIC ORAL) Take 1 capsule by mouth daily.      valACYclovir (VALTREX) 1 gram tablet Take 2 tablets (2,000 mg total) by mouth every 12 (twelve) hours for 1 day, as needed for outbreak on lips 30 tablet 0    ascorbic acid (VITAMIN C) 500 mg tablet Take 500 mg by mouth daily. (Patient not taking: Reported on 2024)       No current  "facility-administered medications on file prior to visit.       Family History   Problem Relation Name Age of Onset    Heart attack Biological Mother      Hyperlipidemia Biological Mother      Stroke Biological Mother      Heart disease Biological Father         Visit Vitals  /70   Pulse 70   Temp 36.1 °C (97 °F) (Temporal)   Ht 1.626 m (5' 4\")   Wt 79 kg (174 lb 3.2 oz)   SpO2 97%   BMI 29.90 kg/m²        Physical Exam  Constitutional:       General: She is not in acute distress.     Appearance: Normal appearance. She is not ill-appearing.   HENT:      Right Ear: Tympanic membrane and ear canal normal.      Left Ear: Tympanic membrane and ear canal normal.      Nose: Nose normal.      Mouth/Throat:      Pharynx: Oropharynx is clear.   Eyes:      Extraocular Movements: Extraocular movements intact.      Pupils: Pupils are equal, round, and reactive to light.   Neck:      Thyroid: No thyromegaly.   Cardiovascular:      Rate and Rhythm: Normal rate and regular rhythm.      Pulses: Normal pulses.           Dorsalis pedis pulses are 2+ on the right side and 2+ on the left side.      Heart sounds: Normal heart sounds. No murmur heard.  Pulmonary:      Effort: Pulmonary effort is normal. No respiratory distress.      Breath sounds: Normal breath sounds. No wheezing, rhonchi or rales.   Abdominal:      General: Bowel sounds are normal.      Palpations: Abdomen is soft. There is no mass.      Tenderness: There is no abdominal tenderness. There is no guarding or rebound.   Musculoskeletal:         General: No swelling or tenderness. Normal range of motion.      Cervical back: Normal range of motion and neck supple.      Right lower leg: No edema.      Left lower leg: No edema.   Lymphadenopathy:      Cervical: No cervical adenopathy.   Skin:     Findings: No lesion or rash.   Neurological:      General: No focal deficit present.      Mental Status: She is alert.      Deep Tendon Reflexes: Reflexes normal. "   Psychiatric:         Mood and Affect: Mood normal.       A/P  1. Encounter for general adult medical examination without abnormal findings (Primary)  Labs UTD per Dr. Dumont    2. Breast cancer screening by mammogram    - BI SCREENING MAMMOGRAM BILATERAL(TOMOSYNTHESIS); Future    3. SVT (supraventricular tachycardia) (CMS/HCC)  Stable, follows with cardiollogy    4. Asymptomatic menopause  Patient is requesting Dexa  - DEXA BONE DENSITY; Future    5. FH: osteoporosis    - DEXA BONE DENSITY; Future     Declines flu vaccine.      Next appointment recommended:  1 year/prn      The patient (parent) indicates an understanding of the events of today's medical evaluation and agrees to the plan of care.    I have asked the patient (parent) to be on the alert for new or worsening symptoms and to call the office directly or proceed to the nearest ER if such should occur.

## 2024-11-19 DIAGNOSIS — E78.49 FAMILIAL HYPERLIPIDEMIA: Primary | ICD-10-CM

## 2024-11-19 RX ORDER — BEMPEDOIC ACID AND EZETIMIBE 180; 10 MG/1; MG/1
180 TABLET, FILM COATED ORAL DAILY
Qty: 90 TABLET | Refills: 3 | Status: SHIPPED | OUTPATIENT
Start: 2024-11-19

## 2024-12-11 ENCOUNTER — HOSPITAL ENCOUNTER (OUTPATIENT)
Dept: RADIOLOGY | Facility: CLINIC | Age: 54
Discharge: HOME | End: 2024-12-11
Attending: FAMILY MEDICINE
Payer: COMMERCIAL

## 2024-12-11 DIAGNOSIS — Z12.31 BREAST CANCER SCREENING BY MAMMOGRAM: ICD-10-CM

## 2024-12-11 DIAGNOSIS — Z82.62 FH: OSTEOPOROSIS: ICD-10-CM

## 2024-12-11 DIAGNOSIS — Z78.0 ASYMPTOMATIC MENOPAUSE: ICD-10-CM

## 2024-12-11 PROCEDURE — 77080 DXA BONE DENSITY AXIAL: CPT

## 2024-12-11 PROCEDURE — 77063 BREAST TOMOSYNTHESIS BI: CPT

## 2024-12-17 ENCOUNTER — APPOINTMENT (OUTPATIENT)
Dept: RADIOLOGY | Age: 54
End: 2024-12-17
Payer: COMMERCIAL

## 2025-02-25 RX ORDER — VALACYCLOVIR HYDROCHLORIDE 1 G/1
TABLET, FILM COATED ORAL
Qty: 30 TABLET | Refills: 0 | Status: SHIPPED | OUTPATIENT
Start: 2025-02-25

## 2025-05-30 NOTE — PROGRESS NOTES
HPI:  Orly Lara is an 53 y.o. female presenting for evaluation of symptoms starting 2/5 with nausea and vomiting. Then olena congestion, frontal headache, teeth, rhinorrhea thick yellow in the morning, then can be clear.    Allergies:  Iodinated contrast media and Lipitor [atorvastatin]    Past Medical History:   Diagnosis Date   • Aneurysm (CMS/HCC)    • Hypertension    • Lipid disorder    • Positional vertigo    • Stroke (CMS/HCC)        Past Surgical History:   Procedure Laterality Date   • CARDIAC SURGERY     • HYSTEROSCOPY W/ ENDOMETRIAL ABLATION     • PATENT FORAMEN OVALE CLOSURE     • SKIN BIOPSY         Social History     Tobacco Use   • Smoking status: Former     Types: Cigarettes     Quit date:      Years since quittin.1   • Smokeless tobacco: Never   Substance Use Topics   • Alcohol use: Yes     Comment: 10 drinks 2 days per week       Current Outpatient Medications on File Prior to Visit   Medication Sig Dispense Refill   • ascorbic acid (VITAMIN C) 500 mg tablet Take 500 mg by mouth daily.     • aspirin 81 mg enteric coated tablet Take 1 tablet (81 mg total) by mouth daily. 90 tablet 1   • hydrochlorothiazide (HYDRODIURIL) 25 mg tablet Take 1 tablet (25 mg total) by mouth daily. 90 tablet 3   • Lactobacillus acidophilus (PROBIOTIC ORAL) Take 1 capsule by mouth daily.     • MAGNESIUM ORAL Take 30 mg by mouth daily.     • omega-3 fatty acids (FISH OIL CONCENTRATE ORAL) Take by mouth.     • rosuvastatin 20 mg tablet TAKE ONE TABLET (20 MG TOTAL) BY MOUTH DAILY. 90 tablet 3   • valACYclovir (VALTREX) 1 gram tablet TAKE TWO TABLETS (2,000 MG TOTAL) BY MOUTH TWO (2) TIMES A DAY FOR TWO DOSES. 30 tablet 0     No current facility-administered medications on file prior to visit.       Family History   Problem Relation Age of Onset   • Heart attack Biological Mother    • Hyperlipidemia Biological Mother    • Stroke Biological Mother    • Heart disease Biological Father        Visit Vitals  BP (!)  Per pharmacist sensimist is not covered by insurance, looking for flonase .   Script has been sent . Pharmacist approved of dose .    Luis Chris.       "119/94 (BP Location: Right upper arm, Patient Position: Sitting)   Pulse 61   Temp 36.6 °C (97.8 °F) (Oral)   Ht 1.6 m (5' 3\")   Wt 81.6 kg (179 lb 12.8 oz)   SpO2 98%   BMI 31.85 kg/m²        Physical Exam  Constitutional:       Appearance: Normal appearance.   HENT:      Right Ear: Tympanic membrane and ear canal normal.      Left Ear: Tympanic membrane and ear canal normal.      Mouth/Throat:      Mouth: Mucous membranes are moist.      Pharynx: No oropharyngeal exudate or posterior oropharyngeal erythema.   Cardiovascular:      Rate and Rhythm: Normal rate and regular rhythm.      Heart sounds: Normal heart sounds.   Pulmonary:      Breath sounds: Normal breath sounds. No wheezing, rhonchi or rales.   Musculoskeletal:      Cervical back: Neck supple.   Lymphadenopathy:      Cervical: No cervical adenopathy.   Skin:     General: Skin is warm and dry.   Neurological:      Mental Status: She is alert.           A/P    1. Acute non-recurrent sinusitis, unspecified location  Supportive care  - amoxicillin-pot clavulanate (AUGMENTIN) 875-125 mg per tablet; Take 1 tablet by mouth 2 (two) times a day for 7 days.  Dispense: 14 tablet; Refill: 0    2. Brain aneurysm  Follows with neurology annually    3. ASD (atrial septal defect)  Follows with cardiology        Next appointment recommended:  prn      The patient (parent) indicates an understanding of the events of today's medical evaluation and agrees to the plan of care.    I have asked the patient (parent) to be on the alert for new or worsening symptoms and to call the office directly or proceed to the nearest ER if such should occur.    Total time spent on day on encounter 20 minutes.  "

## 2025-06-02 ASSESSMENT — ENCOUNTER SYMPTOMS
JOINT SWELLING: 0
DYSURIA: 0
SHORTNESS OF BREATH: 0
PALPITATIONS: 0
VOMITING: 0
HALLUCINATIONS: 0
STRIDOR: 0
EYE REDNESS: 0
COLOR CHANGE: 0
ABDOMINAL PAIN: 0
MYALGIAS: 0
FACIAL SWELLING: 0
AGITATION: 0
HEMATURIA: 0
EYE DISCHARGE: 0
LIGHT-HEADEDNESS: 0
DIAPHORESIS: 0
FATIGUE: 0

## 2025-06-03 ENCOUNTER — OFFICE VISIT (OUTPATIENT)
Dept: CARDIOLOGY | Facility: CLINIC | Age: 55
End: 2025-06-03
Payer: COMMERCIAL

## 2025-06-03 VITALS
RESPIRATION RATE: 18 BRPM | BODY MASS INDEX: 29.16 KG/M2 | HEART RATE: 66 BPM | SYSTOLIC BLOOD PRESSURE: 118 MMHG | WEIGHT: 164.6 LBS | HEIGHT: 63 IN | OXYGEN SATURATION: 98 % | DIASTOLIC BLOOD PRESSURE: 88 MMHG

## 2025-06-03 DIAGNOSIS — Z87.74 H/O AMPLATZER ATRIAL SEPTAL DEFECT CLOSURE: ICD-10-CM

## 2025-06-03 DIAGNOSIS — Q21.10 ASD (ATRIAL SEPTAL DEFECT): ICD-10-CM

## 2025-06-03 DIAGNOSIS — E78.49 FAMILIAL HYPERLIPIDEMIA: ICD-10-CM

## 2025-06-03 DIAGNOSIS — I67.1 BRAIN ANEURYSM: ICD-10-CM

## 2025-06-03 DIAGNOSIS — Q21.12 PFO (PATENT FORAMEN OVALE): ICD-10-CM

## 2025-06-03 DIAGNOSIS — I77.810 DILATED AORTIC ROOT (CMS/HCC): Primary | ICD-10-CM

## 2025-06-03 LAB
ATRIAL RATE: 54
P AXIS: 57
PR INTERVAL: 188
QRS DURATION: 96
QT INTERVAL: 414
QTC CALCULATION(BAZETT): 392
R AXIS: 85
T WAVE AXIS: 53
VENTRICULAR RATE: 54

## 2025-06-03 PROCEDURE — 3008F BODY MASS INDEX DOCD: CPT | Performed by: STUDENT IN AN ORGANIZED HEALTH CARE EDUCATION/TRAINING PROGRAM

## 2025-06-03 PROCEDURE — 99214 OFFICE O/P EST MOD 30 MIN: CPT | Performed by: STUDENT IN AN ORGANIZED HEALTH CARE EDUCATION/TRAINING PROGRAM

## 2025-06-03 PROCEDURE — 93000 ELECTROCARDIOGRAM COMPLETE: CPT | Performed by: STUDENT IN AN ORGANIZED HEALTH CARE EDUCATION/TRAINING PROGRAM

## 2025-06-03 RX ORDER — UBIDECARENONE 100 MG
100 CAPSULE ORAL DAILY
COMMUNITY

## 2025-06-03 RX ORDER — ESTRADIOL 0.03 MG/D
1 PATCH, EXTENDED RELEASE TRANSDERMAL 2 TIMES WEEKLY
COMMUNITY
Start: 2025-04-06

## 2025-06-03 NOTE — PROGRESS NOTES
Km Skaggs MD, [facility]  Non-invasive Cardiology    [facility]  [facility]  [address]  [address]  [place] [address]    [facility] [place]  [address] [place] [address]     [phone]    [facility]     6/3/2025        Patient Name: Orly Lara  Account:          [id number]  :               1970        Dear Mimi Kramer, DO:     I had the pleasure of seeing your patient, Orly Lara, on 6/3/2025. As you know, she is a 55 y.o. female with medical history as described below.     HPI  55 y.o. female with a history of ASD s/p repair (Amplatzer device-), hx of CVA (), aortic root dilation, familial hyperlipidemia, and intracerebral aneurysm who presents for cardiovascular evaluation.  At today's visit the patient reports feeling well.  she denies having any chest pains, shortness of breath, or other anginal equivalents. she also denies having any palpitations or fast irregular heartbeats nor any episodes of presyncope/syncope.  she remains compliant with all her medications and has no major complaints for today.        CARDIOVASCULAR STUDIES:     Lab Results   Component Value Date    CHOL 197 10/01/2024    CHOL 196 2024    TRIG 92 10/01/2024    TRIG 76 2024    HDL 67 10/01/2024    HDL 72 2024    LDLCALC 114 (H) 10/01/2024    LDLCALC 110 (H) 2024    NONHDLCALC 127 10/31/2018       ECG: Independently reviewed:      ECHOCARDIOGRAM:  Results for orders placed during the hospital encounter of 10/08/24    Transthoracic echo (TTE) complete    Interpretation Summary  Sinus bradycardia at 56 bpm.  Normal left ventricular cavity size.  Normal wall thickness.  Ejection fraction 65%.  Normal diastolic function.    Normal mitral valve.  There is a 25 mm Amplatzer closure device across the interatrial septum deployed .    Normal aortic valve.  Normal aortic root.  The  visualized portion of the proximal ascending aorta is dilated to 4.1 cm.  The aortic arch is also dilated to 3.7 cm.  Consider further imaging to size the whole aorta.    Normal right heart.    No significant pericardial effusion.    Results for orders placed during the hospital encounter of 03/13/19    Transthoracic echo (TTE) limited    Interpretation Summary  Cannot rule out small residual PFO s/p closure with 25mm Amplatzer PFO device. Mildly dilated aortic root 3.9 cm other chamber size is normal  No regional wall motion abnormality preserved ejection fraction 70%  No structural valvular abnormalities  Mild tricuspid regurgitation normal pulmonary pressures of 25  Trace mitral regurgitation      JOSUÉ:  Results for orders placed during the hospital encounter of 11/30/18    Transesophageal echo (JOSUÉ)    Interpretation Summary  JOSUÉ performed to assess for PFO and left atrial appendage clot.  The risks of the procedure were discussed with the patient and informed consent was obtained. The patient was sedated with 10 mg IV Versed and 100 mcg IV Fentanyl. The JOSUÉ probe was inserted without difficulty and the patient tolerated the procedure well.  Bubble study performed with agitated saline.    1. Normal left ventricular wall thickness and cavity with preserved systolic function. Estimated EF 65%. No regional wall motion abnormalities.  2. Three cusped aortic valve.  Mild aortic regurgitation.  Normal aortic dimensions. Simple atheroma in the visualized portions of the descending aorta without complex elements.  3. Normal mitral valve.  Trace mitral regurgitation.  Systolic predominance of pulmonary vein flow by spectral doppler.  4. Normal left atrial size. No thrombus or mass seen in the left atrium or left atrial appendage. Left atrial appendage peak emptying velocity 40cm/s.  5. Evidence of small PFO with left to right shunt with agitated saline.  6. Normal right ventricular size with preserved systolic function.  Normal right atrial size.  7. Structurally normal tricuspid valve. Trace tricuspid regurgitation. Insufficient jet to estimate right ventricular systolic pressure.  8. Grossly normal pulmonic valve. No significant pulmonic regurgitation.  9. No pericardial effusion.  10. No previous study for direct comparison.      CORONARY CALCIUM SCAN:  Results for orders placed during the hospital encounter of 10/13/23    CT HEART CORONARY ARTERY CALCIUM SCORE WITHOUT IV CONTRAST    Narrative  CLINICAL HISTORY: Personal history of congenital malformations of the heart.  Hyperglycemia. Thoracic aortic ectasia. Hyperlipidemia.    COMMENT:    CT DOSE:  One or more dose reduction techniques (e.g. automated exposure  control, adjustment of the mA and/or kV according to patient size, use of  iterative reconstruction technique) utilized for this examination.    1) PROCEDURE: Cardiac gated axial CT noncontrast imaging of the heart was  performed. Images were processed with dedicated docTrackra software on a dedicated  PACS Workstation, reviewed by the Radiologist and the coronary artery calcium  score was tabulated.    CT DOSE:  One or more dose reduction techniques (e.g. automated exposure  control, adjustment of the mA and/or kV according to patient size, use of  iterative reconstruction technique) utilized for this examination.    2) CARDIAC FINDINGS:    CORONARY CALCIUM COMPOSITE AGATSTON SCORE: 0    Left Main: 0    LAD: 0    LCX: 0    RCA: 0      3) NON-CARDIAC FINDINGS    AORTA: Stable aneurysmal dilatation of ascending aorta measuring 4.3 cm in AP  diameter at the level of the right pulmonary artery unchanged compared to  4/5/2021.    PULMONARY VEINS: Unremarkable.    PULMONARY ARTERIES: Unremarkable.    SVC: Unremarkable.    IVC: Unremarkable.    LUNG SEN: Limited Evaluation.  Unremarkable    LYMPH NODES: Unremarkable.    OSSEOUS STRUCTURES: Unremarkable.    OTHER: Amplatz closure device in the into atrial septum. Stable soft  "tissue in  the anterior mediastinum.    Impression  IMPRESSION: Ascending aortic aneurysm  1. Composite Agatston coronary artery calcium score of 0, which is between the 0  and 25 percentile for females between the ages of 45 and 54, as per ROCA 2006  Database. This correlates to \"no identifiable plaque\" and \"very low, generally  less than 5%\" risk of coronary artery disease.    LE VENOUS ULTRASOUND:    Results for orders placed during the hospital encounter of 03/07/24    US venous leg left    Narrative  CLINICAL HISTORY:Left lower extremity swelling    Impression  IMPRESSION:  No sonographic evidence of acute deep venous thrombosis in the femoral-popliteal  system in the left lower extremity.    COMMENT:  Real-time duplex sonography of the deep veins of the left lower extremity was  performed with color and spectral Doppler including compression.    The common femoral, superficial femoral, popliteal  and calf veins are normally  compressible with spontaneous phasic wave forms in the left lower extremity.  No  intraluminal filling defect is identified. There is normal color Doppler flow.      Chest CTA - 10/08/2024  IMPRESSION:      Aorta:  Aortic Annulus: 24 mm (normal 20-31 mm)  Sinus of Valsalva: 34 mm (normal 29-45 mm)  Sinotubular junction:3 2 mm (normal 22-36 mm)  Ascending Thoracic Aorta as measured at the level of the right main pulmonary  artery (image 41 series 2): 40 x 38 mm (normal less than 40 mm)  Thoracic aortic arch:24 mm (normal 22-36 mm)  Prominal descending thoracic aorta: 22 x 23 mm (normal 20-30 mm)  Mid descending thoracic aorta:23 x 22 mm (normal 20-30 mm)  Distal descending thoracic aorta:1 x 22 mm (normal 20-30 mm)    1. Stable fusiform dilatation of ascending thoracic aorta measuring 40 mm.      Past Medical History:   Diagnosis Date    Aneurysm (CMS/[facility])     Hypertension     Lipid disorder     Positional vertigo     Stroke (CMS/HCC)         Past Surgical History   Procedure Laterality " Date    Hysteroscopy w/ endometrial ablation      Patent foramen ovale closure      Patent foramen ovale closure N/A 2019    Performed by Isaías Saldana DO at [facility] CARDIAC CATH/EP    Reduction mammaplasty Bilateral 2024    Skin biopsy          Family History   Problem Relation Name Age of Onset    Heart attack Biological Mother      Hyperlipidemia Biological Mother      Stroke Biological Mother      Heart disease Biological Father          Social History     Socioeconomic History    Marital status:      Spouse name: None    Number of children: None    Years of education: None    Highest education level: None   Tobacco Use    Smoking status: Former     Current packs/day: 0.00     Average packs/day: 0.5 packs/day for 18.0 years (9.0 ttl pk-yrs)     Types: Cigarettes     Start date:      Quit date:      Years since quittin.4    Smokeless tobacco: Never   Vaping Use    Vaping status: Never Used   Substance and Sexual Activity    Alcohol use: Yes     Alcohol/week: 5.0 standard drinks of alcohol     Types: 5 Standard drinks or equivalent per week     Comment: weekends    Drug use: Not Currently    Sexual activity: Yes     Partners: Male   Social History Narrative    Do you wear your seatbelt? Yes    Do you have smoke detector in your home? Yes    Do you have a carbon monoxide detector in your home? 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    Current Marital Status?         Current Outpatient Medications   Medication Sig Dispense Refill    bempedoic acid-ezetimibe (NEXLIZET) 180-10 mg tablet Take 180 mg by mouth daily. 90 tablet 3    coenzyme Q10 (COQ10) 100 mg capsule Take 100 mg by mouth daily.      CAITY 0.025 mg/24 hr semiweekly patch Place 1 patch on the skin 2 (two) times a week (Mon, Thu).      estradioL 10 mcg tablet Insert 10 mcg into the vagina 2 (two) times a week (Mon, Thu).      hydrochlorothiazide (HYDRODIURIL) 25 mg tablet Take 1 tablet (25 mg total) by mouth  "daily. 90 tablet 3    Lactobacillus acidophilus (PROBIOTIC ORAL) Take 1 capsule by mouth daily.      multivitamin tablet Take 1 tablet by mouth daily.      progesterone (PROMETRIUM) 100 mg capsule Take 100 mg by mouth nightly.      valACYclovir (VALTREX) 1 gram tablet Take 2 tablets (2,000 mg total) by mouth every 12 (twelve) hours for 1 day, as needed for outbreak on lips 30 tablet 0    vitamin D3-vitamin K2 1,250-200 mcg capsule Take 1 tablet by mouth daily.       No current facility-administered medications for this visit.        Allergies:  Augmentin [amoxicillin-pot clavulanate], Iodinated contrast media, Lipitor [atorvastatin], Penicillin g benzathine, and Rosuvastatin       Review of Systems   Constitutional:  Negative for diaphoresis and fatigue.   HENT:  Negative for facial swelling and nosebleeds.    Eyes:  Negative for discharge and redness.   Respiratory:  Negative for shortness of breath and stridor.    Cardiovascular:  Negative for chest pain and palpitations.   Gastrointestinal:  Negative for abdominal pain and vomiting.   Genitourinary:  Negative for dysuria and hematuria.   Musculoskeletal:  Negative for joint swelling and myalgias.   Skin:  Negative for color change and rash.   Neurological:  Negative for syncope and light-headedness.   Psychiatric/Behavioral:  Negative for agitation and hallucinations.         Vitals:    06/03/25 0857 06/03/25 0919   BP: (!) 128/90 118/88   BP Location: Left upper arm    Patient Position: Sitting    Pulse: 66    Resp: 18    SpO2: 98%    Weight: 74.7 kg (164 lb 9.6 oz)    Height: 1.6 m (5' 3\")        Body mass index is 29.16 kg/m².     Physical Exam  Constitutional:       General: She is not in acute distress.     Appearance: Normal appearance. She is not ill-appearing.   HENT:      Head: Normocephalic and atraumatic.      Nose: Nose normal.   Eyes:      General:         Right eye: No discharge.         Left eye: No discharge.      Conjunctiva/sclera: Conjunctivae " normal.   Neck:      Vascular: No carotid bruit.   Cardiovascular:      Rate and Rhythm: Normal rate and regular rhythm.      Heart sounds: No murmur heard.     No friction rub. No gallop.   Pulmonary:      Effort: No respiratory distress.      Breath sounds: No stridor. No wheezing, rhonchi or rales.   Musculoskeletal:      Right lower leg: No edema.      Left lower leg: No edema.   Skin:     General: Skin is warm and dry.   Neurological:      Mental Status: She is alert.   Psychiatric:         Mood and Affect: Mood normal.         Behavior: Behavior normal.             Diagnosis Plan   1. Dilated aortic root (CMS/HCC)  [facility] MUSE ECG 12 lead (clinic performed)      2. PFO (patent foramen ovale)  [facility] MUSE ECG 12 lead (clinic performed)      3. Familial hyperlipidemia  [facility] MUSE ECG 12 lead (clinic performed)      4. Brain aneurysm        5. H/O Amplatzer atrial septal defect closure        6. ASD (atrial septal defect)              ASSESSMENT AND PLAN:     55 y.o. female with a history of ASD s/p repair (Amplatzer device-2023), hx of CVA (2019), hypertension, aortic root dilation, familial hyperlipidemia, and intracerebral aneurysm who presents for cardiovascular evaluation.     # Familial hyperlipidemia  History of statin intolerance.  Currently tolerating bempedoic acid-ezetimibe 180-10 mg daily well.  CAC 0 in 2023.  Check lipid panel.      # HTN  Blood pressure well-controlled.  Continue HCTZ 25 mg daily.     #Aortic Dilation  Ascending aortic aneurysm measuring 4.0 cm.  Stable on recent chest CTA.  Blood pressure control as above.    #ASD s/p repair (Amplatzer device-2023)  Stable, periodic echocardiograms for monitoring.     # Intracerebral aneurysm  Follow-up with neurology/neurosurgery          Return in about 6 months (around 12/3/2025).     I thank you for the opportunity to take part in the care of Orly Lara.  Please do not hesitate to contact me with any questions or concerns.      Sincerely,  Km Skaggs MD  Cardiovascular Disease  6/3/2025      This note was generated using speech recognition software. Please excuse any typographical errors. Please contact me with any questions or concerns.

## 2025-07-05 ENCOUNTER — NURSE TRIAGE (OUTPATIENT)
Dept: FAMILY MEDICINE | Facility: CLINIC | Age: 55
End: 2025-07-05
Payer: COMMERCIAL

## 2025-07-05 RX ORDER — VALACYCLOVIR HYDROCHLORIDE 1 G/1
2000 TABLET, FILM COATED ORAL EVERY 12 HOURS
Qty: 4 TABLET | Refills: 0 | Status: SHIPPED | OUTPATIENT
Start: 2025-07-05 | End: 2025-07-06

## 2025-07-05 NOTE — TELEPHONE ENCOUNTER
Synopsis:  Patient called for cold sore flare. It started last night. She is in [place] and forgot to bring valtrex. Requested refill of valtrex.     Rx sent. Call if symptoms worsen.     Disposition:  Home Care  Care Advice:  Care Advice Given       No Care Advice given for this encounter.             Orders Placed This Encounter:  Orders Placed This Encounter    valACYclovir (VALTREX) 1 gram tablet     Sig: Take 2 tablets (2,000 mg total) by mouth every 12 (twelve) hours for 1 day.     Dispense:  4 tablet     Refill:  0

## 2025-08-26 ENCOUNTER — TELEPHONE (OUTPATIENT)
Dept: CARDIOLOGY | Facility: CLINIC | Age: 55
End: 2025-08-26
Payer: COMMERCIAL

## (undated) DEVICE — BULB SYRINGE,IRRIGATION WITH PROTECTIVE CAP: Brand: DOVER

## (undated) DEVICE — UNDYED MONOFILAMENT (POLYDIOXANONE), ABSORBABLE SURGICAL SUTURE: Brand: PDS

## (undated) DEVICE — MEDI-VAC YANK SUCT HNDL W/TPRD BULBOUS TIP: Brand: CARDINAL HEALTH

## (undated) DEVICE — SYRINGE 30ML LL

## (undated) DEVICE — STANDARD SURGICAL GOWN, L: Brand: CONVERTORS

## (undated) DEVICE — GUIDEWIRE AMPLATZ EXTRA STIFF .035 X 260CM

## (undated) DEVICE — SUT VICRYL 2-0 SH 27 IN UNDYED J417H

## (undated) DEVICE — STERILE POLYISOPRENE POWDER-FREE SURGICAL GLOVES: Brand: PROTEXIS

## (undated) DEVICE — DISPOSABLE OR TOWEL: Brand: CARDINAL HEALTH

## (undated) DEVICE — CANNULA 4MM HELIXED TRI-PORT II 30CM 10MM PORT

## (undated) DEVICE — KIT CATH LAB ANGIO

## (undated) DEVICE — SCD SEQUENTIAL COMPRESSION COMFORT SLEEVE MEDIUM KNEE LENGTH: Brand: KENDALL SCD

## (undated) DEVICE — STERILE POLYISOPRENE POWDER-FREE SURGICAL GLOVES WITH EMOLLIENT COATING: Brand: PROTEXIS

## (undated) DEVICE — BASIC SINGLE BASIN-LF: Brand: MEDLINE INDUSTRIES, INC.

## (undated) DEVICE — DECANTER: Brand: UNBRANDED

## (undated) DEVICE — SPONGE LAP 18 X 18 IN STRL RFD

## (undated) DEVICE — GUIDEWIRE W/ COIL .025" 230CM (MIN ORD 5/EA)

## (undated) DEVICE — NEEDLE BLUNT 18 G X 1 1/2IN

## (undated) DEVICE — CANNISTER WASTE IMPLOSION PROOF

## (undated) DEVICE — INTENDED FOR TISSUE SEPARATION, AND OTHER PROCEDURES THAT REQUIRE A SHARP SURGICAL BLADE TO PUNCTURE OR CUT.: Brand: BARD-PARKER ® SAFETYLOCK CARBON RIB-BACK BLADES

## (undated) DEVICE — LIGHT GLOVE GREEN

## (undated) DEVICE — ADHESIVE SKIN HIGH VISCOSITY EXOFIN PRECISION PEN

## (undated) DEVICE — CATH ACUNAV 8FR

## (undated) DEVICE — ASTOUND STANDARD SURGICAL GOWN, XL: Brand: CONVERTORS

## (undated) DEVICE — NEPTUNE E-SEP SMOKE EVACUATION PENCIL, COATED, 70MM BLADE, PUSH BUTTON SWITCH: Brand: NEPTUNE E-SEP

## (undated) DEVICE — SHEATH 11FR PINNACLE

## (undated) DEVICE — SHEATH 9FR X 23 CM

## (undated) DEVICE — SKIN MARKER DUAL TIP WITH RULER CAP, FLEXIBLE RULER AND LABELS: Brand: DEVON

## (undated) DEVICE — 1820 FOAM BLOCK NEEDLE COUNTER: Brand: DEVON

## (undated) DEVICE — PACK UNIVERSAL DRAPES SUB-Q ICD

## (undated) DEVICE — DRAPE TOWEL: Brand: CONVERTORS

## (undated) DEVICE — KERLIX BANDAGE ROLL: Brand: KERLIX

## (undated) DEVICE — SHEATH 9FR PINNACLE

## (undated) DEVICE — SUT MONOCRYL 3-0 PS-2 27 IN Y427H

## (undated) DEVICE — DRAPE SHEET THREE QUARTER

## (undated) DEVICE — TUBING SUCTION 5MM X 12 FT

## (undated) DEVICE — CHLORAPREP HI-LITE 26ML ORANGE

## (undated) DEVICE — IMPLANTABLE DEVICE
Type: IMPLANTABLE DEVICE | Status: NON-FUNCTIONAL
Removed: 2019-02-14

## (undated) DEVICE — CATH 6F MPA 1

## (undated) DEVICE — TRAY FOLEY 16FR URIMETER SURESTEP

## (undated) DEVICE — ABDOMINAL PAD: Brand: DERMACEA

## (undated) DEVICE — TUBING INFILTRATION 9FT

## (undated) DEVICE — SYRINGE 20ML LL

## (undated) DEVICE — TUBING LIPOSUCTION ASPIRATION 12FT STERILE

## (undated) DEVICE — CHEST/BREAST DRAPE: Brand: CONVERTORS

## (undated) DEVICE — SHEATH INTRODUCER PRELUDE IDEAL HYDROPHILIC SF 6F .021MM

## (undated) DEVICE — NEEDLE 23G X 1 1/2 SAFETY-GLIDE THIN WALL